# Patient Record
Sex: FEMALE | Race: WHITE | NOT HISPANIC OR LATINO | Employment: OTHER | URBAN - METROPOLITAN AREA
[De-identification: names, ages, dates, MRNs, and addresses within clinical notes are randomized per-mention and may not be internally consistent; named-entity substitution may affect disease eponyms.]

---

## 2019-05-02 ENCOUNTER — OFFICE VISIT (OUTPATIENT)
Dept: NEUROLOGY | Facility: CLINIC | Age: 54
End: 2019-05-02
Payer: COMMERCIAL

## 2019-05-02 VITALS
DIASTOLIC BLOOD PRESSURE: 88 MMHG | HEIGHT: 67 IN | SYSTOLIC BLOOD PRESSURE: 125 MMHG | WEIGHT: 186.31 LBS | BODY MASS INDEX: 29.24 KG/M2 | HEART RATE: 84 BPM

## 2019-05-02 DIAGNOSIS — R27.0 ATAXIA: Primary | ICD-10-CM

## 2019-05-02 DIAGNOSIS — E53.8 VITAMIN B12 DEFICIENCY: ICD-10-CM

## 2019-05-02 PROCEDURE — 3008F BODY MASS INDEX DOCD: CPT | Mod: CPTII,S$GLB,, | Performed by: PSYCHIATRY & NEUROLOGY

## 2019-05-02 PROCEDURE — 99205 OFFICE O/P NEW HI 60 MIN: CPT | Mod: S$GLB,,, | Performed by: PSYCHIATRY & NEUROLOGY

## 2019-05-02 PROCEDURE — 99999 PR PBB SHADOW E&M-NEW PATIENT-LVL III: CPT | Mod: PBBFAC,,, | Performed by: PSYCHIATRY & NEUROLOGY

## 2019-05-02 PROCEDURE — 99999 PR PBB SHADOW E&M-NEW PATIENT-LVL III: ICD-10-PCS | Mod: PBBFAC,,, | Performed by: PSYCHIATRY & NEUROLOGY

## 2019-05-02 PROCEDURE — 3008F PR BODY MASS INDEX (BMI) DOCUMENTED: ICD-10-PCS | Mod: CPTII,S$GLB,, | Performed by: PSYCHIATRY & NEUROLOGY

## 2019-05-02 PROCEDURE — 99205 PR OFFICE/OUTPT VISIT, NEW, LEVL V, 60-74 MIN: ICD-10-PCS | Mod: S$GLB,,, | Performed by: PSYCHIATRY & NEUROLOGY

## 2019-05-02 RX ORDER — CYANOCOBALAMIN 1000 UG/ML
1000 INJECTION, SOLUTION INTRAMUSCULAR; SUBCUTANEOUS
COMMUNITY
Start: 2019-02-06

## 2019-05-02 RX ORDER — AMLODIPINE BESYLATE 5 MG/1
5 TABLET ORAL NIGHTLY
COMMUNITY
Start: 2019-04-01 | End: 2022-04-27

## 2019-05-02 RX ORDER — DESVENLAFAXINE 100 MG/1
100 TABLET, EXTENDED RELEASE ORAL NIGHTLY
COMMUNITY
Start: 2019-04-01

## 2019-05-02 NOTE — PROGRESS NOTES
MOVEMENT DISORDERS CLINIC NEW CONSULT NOTE    PCP/Referring Provider: Aaareferral Self  No address on file  Date of Service: 5/2/2019    Chief Complaint: Ataxia    HPI: Nirali Kirkpatrick is a L HANDED 53 y.o. female with a medical issues significant for HTN, Anxiety, Vit B12 deficiency, who presents with ataxia for 1.5 years. She first noted 1.5 years ago her R foot was dragging and every 3-4 steps she would catch that toe. She noticed her R foot would catch on curbs. Going down steps is especially hard - she mis-steps often. She feels like she has progressively been imbalanced. Her first fall was a year ago. She has fallen more often since then. She falls twice a month now. Falls typically sideways. She holds onto her  for stability but does not use an assist device. Tends to have mor ataxia after she's tired. She occasionally has a had tremor in her hands when starting IVs. She does at times feel clumsy with her hands. No spinning vertigo. No lightheadedness. No visual issues.    No double vision, eye drooping, head drop, dysphagia.  She was told her B12 was low normal Aug 2018, and has had 4 shots since    No dysarthria.    She saw a general neurologist  B6 - NL  B12 - 333    Brain MRI read as normal  Spine and Tspine 2018 - no central disc issues  Has not had an EMG    No fam hx ataxia, MS  Uncle has M. Gravis  Mother and brother have had tremors    PD Review of Symptoms:  Anosmia: None  Dysarthria/Hypophonia: None  Dysphagia/Sialorrhea: None  Hallucinations: None  Depression: None  Cognitive slowing: None  Urinary changes: None  Constipation: at times  Orthostasis: none  Falls: as above  Micrographia: none  Sleep issues:  -SUSAN: at times snores  -RBD: Talks and punches in her sleep  -Sleep Quality: good, wakes every 3 hours    Review of Systems:   Review of Systems   Constitutional: Negative for fever.   HENT: Negative for congestion.    Eyes: Negative for double vision.   Respiratory: Negative for cough  and shortness of breath.    Cardiovascular: Negative for chest pain and leg swelling.   Gastrointestinal: Negative for nausea.   Genitourinary: Negative for dysuria.   Musculoskeletal: Positive for falls.   Skin: Negative for rash.   Neurological: Positive for tremors. Negative for speech change and headaches.   Psychiatric/Behavioral: Negative for depression.         Current Medications:  Outpatient Encounter Medications as of 5/2/2019   Medication Sig Dispense Refill    amLODIPine (NORVASC) 5 MG tablet       cyanocobalamin 1,000 mcg/mL injection       desvenlafaxine succinate (PRISTIQ) 100 MG Tb24        No facility-administered encounter medications on file as of 5/2/2019.        Past Medical History:  HTN    Past Surgical History:  Gallbladder, gastric sleeve    Current Living Situation: home    Social:  Social History     Socioeconomic History    Marital status:      Spouse name: Not on file    Number of children: Not on file    Years of education: Not on file    Highest education level: Not on file   Occupational History    Not on file   Social Needs    Financial resource strain: Not on file    Food insecurity:     Worry: Not on file     Inability: Not on file    Transportation needs:     Medical: Not on file     Non-medical: Not on file   Tobacco Use    Smoking status: Never Smoker   Substance and Sexual Activity    Alcohol use: Not on file    Drug use: Not on file    Sexual activity: Not on file   Lifestyle    Physical activity:     Days per week: Not on file     Minutes per session: Not on file    Stress: Not on file   Relationships    Social connections:     Talks on phone: Not on file     Gets together: Not on file     Attends [employer] service: Not on file     Active member of club or organization: Not on file     Attends meetings of clubs or organizations: Not on file     Relationship status: Not on file   Other Topics Concern    Not on file   Social History Narrative    Not  "on file       Family History:  No family history on file.    PHYSICAL:  /88   Pulse 84   Ht 5' 7" (1.702 m)   Wt 84.5 kg (186 lb 4.6 oz)   BMI 29.18 kg/m²     General Medical Examination:  General: Good hygiene, appropriate appearance.  HEENT: Normocephalic, atraumatic.   Neck: Supple.   Chest: Unlabored breathing.   CV: Symmetric pulses.   Ext: No clubbing, cyanosis, or edema.     Mental Status:  Mood/Affect: Appropriate/congruent.  Level of consciousness: Awake, alert.  Orientation: Oriented to person, place, time and situation.  Language: No Dysarthria    Cranial nerves:  I: Not tested  II: PERRL, VFF to counting  III, IV, VI: EOMI with conjugate gaze and no nystagmus on end gaze  V: Facial sensation intact and symmetric over the bilateral V1-V3  VII: Facial muscle activation intact and symmetric over the bilateral upper and lower face  VIII: Hearing intact in the b/l ears and symmetrical to finger rub  IX, X, XII: TUP midline - no atrophy or fasiculations  X: SCMs and shoulder shrug full strength b/l and symmetric  Can easily say PA PA GA GA KA KA    Motor:   Strength Intact throughout upper and lower extremities, 5/5.  No foot drop    DTRs:  ? Biceps Triceps Brachioradialis Knee Ankle   Left 2+ 2+ 2+ 2+ 2+   Right 2+ 2+ 2+ 2+ 2+   -Plantar reflex: NL    ? Finger taps Finger flicks PEDRO Heel taps   Left nl nl nl nl   Right nl nl nl nl   Neck tone: nl  ? Arm Leg   Left nl nl   Right nl nl     Sensation:   -Light touch: Intact and symmetric in the bilateral upper and lower extremities.  -Temp: Intact and symmetric in the bilateral upper and lower extremities.  -Vibration: mild vibratory loss to mid-shin  Coordination:   -Finger to nose: intention tremor mild bilat  Past-points 1 inch R hand >L hand  -Heel to shin: mild issues L foot    Gait:  -Arises from chair without use of hands.  -Casual gait is: wide based, somewhat stiff, circumducts, waves moderately L to R when she walks - ataxic  -Stride length: " nl  -Arm Swing: decreased R  -Turning: wide  -Tandem gait: cannot  No foot drop    Romberg: strongly POS    Laboratory Data:  NA    Imaging:  Brain MRI w/o cerebellar degeneration or lesion - however poor quality MRI  Cspine w/o without central impingement    Assessment//Plan:   Problem List Items Addressed This Visit        Neuro    Ataxia - Primary    Current Assessment & Plan     Moderate progressive ataxia. Risk factors are known B12 deficiency from gastric sleeve. She has been repleated through IV but unknown if her levels emile post treatment. POS rhomberg suggests a proprioceptive cause. No dysarthria or nystagmus to strongly suggests a central cause of ataxia. NL brain and cspine MRI however images are poor quality. Suggested EMG to screen for level of neuropathy, MRI brain wwo to screen for cerebellar dz, and lab workup including GAD65 and paraneoplastics given the relatively subacute onset of her ataxia. No fam hx ataxia.  Suggested aggressive PT.         Relevant Orders    GLUTAMIC ACID DECARBOXYLASE    Comprehensive metabolic panel    Ammonia    Vitamin B12 Deficiency Panel    VITAMIN B1    VITAMIN E    TSH    HEAVY METALS SCREEN, BLOOD (QUANTITATIVE)    PTH, intact    Paraneoplastic Autoantibody Evaulation, Serum    FOLATE    EMG W/ ULTRASOUND AND NERVE CONDUCTION TEST 2 Extremities    Ambulatory consult to Physical Therapy    MRI Brain W WO Contrast       Endocrine    Vitamin B12 deficiency    Current Assessment & Plan     Known B12 deficiency due to gastric sleeve. Will f/u B12 levels.               Follow-up: 1 mo  Discussed the importance of ongoing exercise in efforts to improve mobility and balance.    Ondina Polo MD, MS [facility]  Department of Neurology  Movement Disorders

## 2019-05-02 NOTE — ASSESSMENT & PLAN NOTE
Moderate progressive ataxia. Risk factors are known B12 deficiency from gastric sleeve. She has been repleated through IV but unknown if her levels emile post treatment. POS rhomberg suggests a proprioceptive cause. No dysarthria or nystagmus to strongly suggests a central cause of ataxia. NL brain and cspine MRI however images are poor quality. Suggested EMG to screen for level of neuropathy, MRI brain wwo to screen for cerebellar dz, and lab workup including GAD65 and paraneoplastics given the relatively subacute onset of her ataxia. No fam hx ataxia.  Suggested aggressive PT.

## 2019-05-10 ENCOUNTER — HOSPITAL ENCOUNTER (OUTPATIENT)
Dept: RADIOLOGY | Facility: HOSPITAL | Age: 54
Discharge: HOME | End: 2019-05-10
Attending: PSYCHIATRY & NEUROLOGY
Payer: COMMERCIAL

## 2019-05-10 DIAGNOSIS — R27.0 ATAXIA: ICD-10-CM

## 2019-05-10 PROCEDURE — 25500020 PHARM REV CODE 255: Performed by: PSYCHIATRY & NEUROLOGY

## 2019-05-10 PROCEDURE — A9585 GADOBUTROL INJECTION: HCPCS | Performed by: PSYCHIATRY & NEUROLOGY

## 2019-05-10 PROCEDURE — 70553 MRI BRAIN STEM W/O & W/DYE: CPT | Mod: 26,,, | Performed by: RADIOLOGY

## 2019-05-10 PROCEDURE — 70553 MRI BRAIN W WO CONTRAST: ICD-10-PCS | Mod: 26,,, | Performed by: RADIOLOGY

## 2019-05-10 PROCEDURE — 70553 MRI BRAIN STEM W/O & W/DYE: CPT | Mod: TC

## 2019-05-10 RX ORDER — GADOBUTROL 604.72 MG/ML
9 INJECTION INTRAVENOUS
Status: COMPLETED | OUTPATIENT
Start: 2019-05-10 | End: 2019-05-10

## 2019-05-10 RX ADMIN — GADOBUTROL: 604.72 INJECTION INTRAVENOUS at 08:05

## 2019-05-12 ENCOUNTER — PATIENT MESSAGE (OUTPATIENT)
Dept: NEUROLOGY | Facility: CLINIC | Age: 54
End: 2019-05-12

## 2019-06-03 ENCOUNTER — OFFICE VISIT (OUTPATIENT)
Dept: NEUROLOGY | Facility: CLINIC | Age: 54
End: 2019-06-03
Payer: COMMERCIAL

## 2019-06-03 VITALS
BODY MASS INDEX: 29.51 KG/M2 | WEIGHT: 188 LBS | SYSTOLIC BLOOD PRESSURE: 122 MMHG | DIASTOLIC BLOOD PRESSURE: 89 MMHG | HEIGHT: 67 IN | HEART RATE: 66 BPM

## 2019-06-03 DIAGNOSIS — R27.0 ATAXIA: Primary | ICD-10-CM

## 2019-06-03 PROCEDURE — 3008F BODY MASS INDEX DOCD: CPT | Mod: CPTII,S$GLB,, | Performed by: PSYCHIATRY & NEUROLOGY

## 2019-06-03 PROCEDURE — 99215 OFFICE O/P EST HI 40 MIN: CPT | Mod: S$GLB,,, | Performed by: PSYCHIATRY & NEUROLOGY

## 2019-06-03 PROCEDURE — 99999 PR PBB SHADOW E&M-EST. PATIENT-LVL III: ICD-10-PCS | Mod: PBBFAC,,, | Performed by: PSYCHIATRY & NEUROLOGY

## 2019-06-03 PROCEDURE — 99215 PR OFFICE/OUTPT VISIT, EST, LEVL V, 40-54 MIN: ICD-10-PCS | Mod: S$GLB,,, | Performed by: PSYCHIATRY & NEUROLOGY

## 2019-06-03 PROCEDURE — 99999 PR PBB SHADOW E&M-EST. PATIENT-LVL III: CPT | Mod: PBBFAC,,, | Performed by: PSYCHIATRY & NEUROLOGY

## 2019-06-03 PROCEDURE — 3008F PR BODY MASS INDEX (BMI) DOCUMENTED: ICD-10-PCS | Mod: CPTII,S$GLB,, | Performed by: PSYCHIATRY & NEUROLOGY

## 2019-06-03 NOTE — PROGRESS NOTES
"MOVEMENT DISORDERS CLINIC NEW CONSULT NOTE    PCP/Referring Provider: No referring provider defined for this encounter.  Date of Service: 6/3/2019    Chief Complaint: Ataxia    Interval hx  Continues to have decreased R arm swing  Continues to have progressive imbalance  Trouble with stairs, going down, however has R knee pain  Struggles to get up from the floor without using her arms    Has been going to PT twice a week  No numbness or tingling in feet  No change to voice  No trouble swallowing    At times she has ptosis    MRI brain showed no lesions or cerebellar atrophy    "PriorHPI: Nirali Kirkpatrick is a L HANDED 53 y.o. female with a medical issues significant for HTN, Anxiety, Vit B12 deficiency, who presents with ataxia for 1.5 years. She first noted 1.5 years ago her R foot was dragging and every 3-4 steps she would catch that toe. She noticed her R foot would catch on curbs. Going down steps is especially hard - she mis-steps often. She feels like she has progressively been imbalanced. Her first fall was a year ago. She has fallen more often since then. She falls twice a month now. Falls typically sideways. She holds onto her  for stability but does not use an assist device. Tends to have more ataxia after she's tired. She occasionally has a had tremor in her hands when starting IVs. She does at times feel clumsy with her hands. No spinning vertigo. No lightheadedness. No visual issues.    No double vision, eye drooping, head drop, dysphagia.  She was told her B12 was low normal Aug 2018, and has had 4 shots since    No dysarthria.    She saw a general neurologist  B6 - NL  B12 - 333    Brain MRI read as normal  Spine and Tspine 2018 - no central disc issues  Has not had an EMG    No fam hx ataxia, MS  Uncle has M. Gravis  Mother and brother have had tremors    PD Review of Symptoms:  Anosmia: None  Dysarthria/Hypophonia: None  Dysphagia/Sialorrhea: None  Hallucinations: None  Depression: " "None  Cognitive slowing: None  Urinary changes: None  Constipation: at times  Orthostasis: none  Falls: as above  Micrographia: none  Sleep issues:  -SUSAN: at times snores  -RBD: Talks and punches in her sleep  -Sleep Quality: good, wakes every 3 hours"    Review of Systems:   Review of Systems   Constitutional: Negative for fever.   HENT: Negative for congestion.    Eyes: Negative for double vision.   Respiratory: Negative for cough and shortness of breath.    Cardiovascular: Negative for chest pain and leg swelling.   Gastrointestinal: Negative for nausea.   Genitourinary: Negative for dysuria.   Musculoskeletal: Positive for falls.   Skin: Negative for rash.   Neurological: Positive for tremors. Negative for speech change and headaches.   Psychiatric/Behavioral: Negative for depression.         Current Medications:  Outpatient Encounter Medications as of 6/3/2019   Medication Sig Dispense Refill    amLODIPine (NORVASC) 5 MG tablet       cyanocobalamin 1,000 mcg/mL injection       desvenlafaxine succinate (PRISTIQ) 100 MG Tb24        No facility-administered encounter medications on file as of 6/3/2019.        Past Medical History:  HTN    Past Surgical History:  Gallbladder, gastric sleeve    Current Living Situation: home    Social:  Social History     Socioeconomic History    Marital status:      Spouse name: Not on file    Number of children: Not on file    Years of education: Not on file    Highest education level: Not on file   Occupational History    Not on file   Social Needs    Financial resource strain: Not on file    Food insecurity:     Worry: Not on file     Inability: Not on file    Transportation needs:     Medical: Not on file     Non-medical: Not on file   Tobacco Use    Smoking status: Never Smoker   Substance and Sexual Activity    Alcohol use: Not on file    Drug use: Not on file    Sexual activity: Not on file   Lifestyle    Physical activity:     Days per week: Not on " "file     Minutes per session: Not on file    Stress: Not on file   Relationships    Social connections:     Talks on phone: Not on file     Gets together: Not on file     Attends Zoroastrian service: Not on file     Active member of club or organization: Not on file     Attends meetings of clubs or organizations: Not on file     Relationship status: Not on file   Other Topics Concern    Not on file   Social History Narrative    Not on file       Family History:  No family history on file.    PHYSICAL:  /89   Pulse 66   Ht 5' 7" (1.702 m)   Wt 85.3 kg (188 lb)   BMI 29.44 kg/m²     General Medical Examination:  General: Good hygiene, appropriate appearance.  HEENT: Normocephalic, atraumatic.   Neck: Supple.   Chest: Unlabored breathing.   CV: Symmetric pulses.   Ext: No clubbing, cyanosis, or edema.     Mental Status:  Mood/Affect: Appropriate/congruent.  Level of consciousness: Awake, alert.  Orientation: Oriented to person, place, time and situation.  Language: No Dysarthria    Cranial nerves:  I: Not tested  II: PERRL, VFF to counting  III, IV, VI: EOMI with conjugate gaze and no nystagmus on end gaze ? KF rings  V: Facial sensation intact and symmetric over the bilateral V1-V3  VII: Facial muscle activation intact and symmetric over the bilateral upper and lower face  VIII: Hearing intact in the b/l ears and symmetrical to finger rub  IX, X, XII: TUP midline - no atrophy or fasiculations  X: SCMs and shoulder shrug full strength b/l and symmetric  Can easily say PA PA GA GA KA KA    Gaze fatigues at times  No ptosis    Motor:   Strength Intact throughout upper and lower extremities, 5/5.  No foot drop    DTRs:  ? Biceps Triceps Brachioradialis Knee Ankle   Left 2+ 2+ 2+ 2+ 2+   Right 2+ 2+ 2+ 2+ 2+   -Plantar reflex: NL    ? Finger taps Finger flicks PEDRO Heel taps   Left nl nl nl nl   Right nl nl nl nl   Neck tone: nl  ? Arm Leg   Left nl nl   Right nl nl     Sensation:   -Light touch: Intact and " symmetric in the bilateral upper and lower extremities.  -Temp: Intact and symmetric in the bilateral upper and lower extremities.  -Vibration: mild vibratory loss to mid-shin  Coordination:   -Finger to nose: intention tremor mild bilat  Past-points 1 inch R hand >L hand  -Heel to shin: mild issues L foot    Gait:  -Arises from chair without use of hands.  -Casual gait is: wide based, somewhat stiff, circumducts, waves moderately L to R when she walks - ataxic  -Stride length: nl  -Arm Swing: decreased R  -Turning: wide  -Tandem gait: cannot  No foot drop    Romberg: strongly POS    Laboratory Data:  Results for RADHA HAHN (MRN [id number]) as of 6/3/2019 09:52   Ref. Range 5/2/2019 11:23   Folate Latest Ref Range: 4.0 - 24.0 ng/mL 10.7   Vitamin B-12 Latest Ref Range: 180 - 914 ng/L 525   Sodium Latest Ref Range: 136 - 145 mmol/L 141   Potassium Latest Ref Range: 3.5 - 5.1 mmol/L 3.7   Chloride Latest Ref Range: 95 - 110 mmol/L 106   CO2 Latest Ref Range: 23 - 29 mmol/L 26   Anion Gap Latest Ref Range: 8 - 16 mmol/L 9   BUN, Bld Latest Ref Range: 6 - 20 mg/dL 22 (H)   Creatinine Latest Ref Range: 0.5 - 1.4 mg/dL 0.8   eGFR if non African American Latest Ref Range: >60 mL/min/1.73 m^2 >60.0   eGFR if African American Latest Ref Range: >60 mL/min/1.73 m^2 >60.0   Glucose Latest Ref Range: 70 - 110 mg/dL 92   Calcium Latest Ref Range: 8.7 - 10.5 mg/dL 9.8   Alkaline Phosphatase Latest Ref Range: 55 - 135 U/L 86   PROTEIN TOTAL Latest Ref Range: 6.0 - 8.4 g/dL 7.4   Albumin Latest Ref Range: 3.5 - 5.2 g/dL 4.2   BILIRUBIN TOTAL Latest Ref Range: 0.1 - 1.0 mg/dL 0.6   AST Latest Ref Range: 10 - 40 U/L 19   ALT Latest Ref Range: 10 - 44 U/L 16   Ammonia Latest Ref Range: 10 - 50 umol/L 26   Arsenic Latest Ref Range: 0 - 12 ng/mL <1   Thiamine Latest Ref Range: 38 - 122 ug/L 63   Vitamin E Latest Ref Range: 500 - 1800 ug/dL 1302   Glutamic Acid Decarb Ab Latest Ref Range: <=0.02 nmol/L 0.00   TSH Latest Ref Range:  0.400 - 4.000 uIU/mL 0.649   PTH Latest Ref Range: 9.0 - 77.0 pg/mL 103.0 (H)   Race Unknown Test Not Performed   AChR Binding Ab, Serum Latest Ref Range: <=0.02 nmol/L 0.00   AChR Ganglionic Neuronal Ab Latest Ref Range: <=0.02 nmol/L 0.00   CRMP-5 IgG Latest Ref Range: <1:240 titer Negative   Neuronal (V-G) K+ Channel Ab, Serum Latest Ref Range: <=0.02 nmol/L 0.00   NMO Interpretive Comments Unknown SEE BELOW   N-Type Calcium Channel Ab Latest Ref Range: <=0.03 nmol/L 0.00   P/Q Type Calcium Channel Ab Latest Ref Range: <=0.02 nmol/L 0.00   PAVAL AGNA-1, Serum Latest Ref Range: <1:240 titer Negative   PAVAL TOBY-1, Serum Latest Ref Range: <1:240 titer Negative   PAVAL TOBY-2, Serum Latest Ref Range: <1:240 titer Negative   PAVAL TOBY-3, Serum Latest Ref Range: <1:240 titer Negative   PAVAL reflex test added Unknown None.   PAVAL,  Amphiphysin Ab, Serum Latest Ref Range: <1:240 titer Negative   PAVAL, PCA-1, Serum Latest Ref Range: <1:240 titer Negative   PAVAL, PCA-2, Serum Latest Ref Range: <1:240 titer Negative   PAVAL, PCA-Tr, Serum Latest Ref Range: <1:240 titer Negative   STRIATED MUSCLE AB Latest Ref Range: <1:120 titer Negative   Lead Latest Ref Range: 0.0 - 4.9 mcg/dL <1.0   Cadmium Latest Ref Range: 0.0 - 4.9 ng/mL 0.3   City Unknown Test Not Performed   County Unknown Test Not Performed   Guardian First Name Unknown Test Not Performed   Guardian Last Name Unknown Test Not Performed   Home Phone Unknown Test Not Performed   [place] Latest Ref Range: 0 - 9 ng/mL 2   State Unknown Test Not Performed   Street Address Unknown Test Not Performed   Venous/Capillary Unknown Test Not Performed   Zip Unknown Test Not Performed       Imaging:  Brain MRI w/o cerebellar degeneration or lesion - however poor quality MRI  Repeat brain MRI  FINDINGS:  MRI of the brain demonstrates no infarction, mass, hemorrhage, extra-axial collection, or other acute finding.  A skull base lesions are seen.  Following contrast  administration, there is no abnormal enhancing lesion identified.  A small incidental developmental venous anomaly is seen in the right medial posterior thalamus.  Cspine w/o without central impingement    Assessment//Plan:   Problem List Items Addressed This Visit     None          Follow-up: 1 mo  Discussed the importance of ongoing exercise in efforts to improve mobility and balance.    Ondina Polo MD, MS  [facility]  Department of Neurology  Movement Disorders    PAGE  cerulo  ESR  CRP  Also  CK    EMG is coming

## 2019-06-19 ENCOUNTER — TELEPHONE (OUTPATIENT)
Dept: NEUROLOGY | Facility: CLINIC | Age: 54
End: 2019-06-19

## 2019-06-19 NOTE — TELEPHONE ENCOUNTER
Faxed sent     ----- Message from Saul Winkler sent at 6/19/2019  3:29 PM CDT -----  Needs Advice    Reason for call: Marisela states they need clinical notes for NM spect scan so they can get auth from [employer]. Pls fax to .        Communication Preference: Marisela w/ [facility] @ [phone]    Additional Information:

## 2019-07-12 ENCOUNTER — TELEPHONE (OUTPATIENT)
Dept: NEUROLOGY | Facility: CLINIC | Age: 54
End: 2019-07-12

## 2019-07-12 NOTE — TELEPHONE ENCOUNTER
----- Message from Franck Cerda sent at 7/11/2019  3:02 PM CDT -----  Contact: Gaby combs [facility] @ [phone]  Caller is requesting a return phone call regarding the signed plan of care, pls call or fax to: [phone]

## 2019-08-16 ENCOUNTER — PROCEDURE VISIT (OUTPATIENT)
Dept: NEUROLOGY | Facility: CLINIC | Age: 54
End: 2019-08-16
Payer: COMMERCIAL

## 2019-08-16 DIAGNOSIS — R27.0 ATAXIA: ICD-10-CM

## 2019-08-16 PROCEDURE — 95886 PR EMG COMPLETE, W/ NERVE CONDUCTION STUDIES, 5+ MUSCLES: ICD-10-PCS | Mod: S$GLB,,, | Performed by: PSYCHIATRY & NEUROLOGY

## 2019-08-16 PROCEDURE — 95886 MUSC TEST DONE W/N TEST COMP: CPT | Mod: S$GLB,,, | Performed by: PSYCHIATRY & NEUROLOGY

## 2019-08-16 PROCEDURE — 95910 PR NERVE CONDUCTION STUDY; 7-8 STUDIES: ICD-10-PCS | Mod: S$GLB,,, | Performed by: PSYCHIATRY & NEUROLOGY

## 2019-08-16 PROCEDURE — 95910 NRV CNDJ TEST 7-8 STUDIES: CPT | Mod: S$GLB,,, | Performed by: PSYCHIATRY & NEUROLOGY

## 2019-08-23 ENCOUNTER — OFFICE VISIT (OUTPATIENT)
Dept: NEUROLOGY | Facility: CLINIC | Age: 54
End: 2019-08-23
Payer: COMMERCIAL

## 2019-08-23 ENCOUNTER — LAB VISIT (OUTPATIENT)
Dept: LAB | Facility: HOSPITAL | Age: 54
End: 2019-08-23
Attending: PSYCHIATRY & NEUROLOGY
Payer: COMMERCIAL

## 2019-08-23 VITALS
SYSTOLIC BLOOD PRESSURE: 119 MMHG | DIASTOLIC BLOOD PRESSURE: 83 MMHG | HEART RATE: 65 BPM | HEIGHT: 67 IN | BODY MASS INDEX: 30.45 KG/M2 | WEIGHT: 194 LBS

## 2019-08-23 DIAGNOSIS — R27.0 ATAXIA: ICD-10-CM

## 2019-08-23 DIAGNOSIS — R27.0 ATAXIA: Primary | ICD-10-CM

## 2019-08-23 PROCEDURE — 83921 ORGANIC ACID SINGLE QUANT: CPT

## 2019-08-23 PROCEDURE — 86341 ISLET CELL ANTIBODY: CPT

## 2019-08-23 PROCEDURE — 82607 VITAMIN B-12: CPT

## 2019-08-23 PROCEDURE — 3008F BODY MASS INDEX DOCD: CPT | Mod: CPTII,S$GLB,, | Performed by: PSYCHIATRY & NEUROLOGY

## 2019-08-23 PROCEDURE — 99214 PR OFFICE/OUTPT VISIT, EST, LEVL IV, 30-39 MIN: ICD-10-PCS | Mod: S$GLB,,, | Performed by: PSYCHIATRY & NEUROLOGY

## 2019-08-23 PROCEDURE — 36415 COLL VENOUS BLD VENIPUNCTURE: CPT

## 2019-08-23 PROCEDURE — 3008F PR BODY MASS INDEX (BMI) DOCUMENTED: ICD-10-PCS | Mod: CPTII,S$GLB,, | Performed by: PSYCHIATRY & NEUROLOGY

## 2019-08-23 PROCEDURE — 99999 PR PBB SHADOW E&M-EST. PATIENT-LVL III: CPT | Mod: PBBFAC,,, | Performed by: PSYCHIATRY & NEUROLOGY

## 2019-08-23 PROCEDURE — 99214 OFFICE O/P EST MOD 30 MIN: CPT | Mod: S$GLB,,, | Performed by: PSYCHIATRY & NEUROLOGY

## 2019-08-23 PROCEDURE — 99999 PR PBB SHADOW E&M-EST. PATIENT-LVL III: ICD-10-PCS | Mod: PBBFAC,,, | Performed by: PSYCHIATRY & NEUROLOGY

## 2019-08-23 NOTE — ASSESSMENT & PLAN NOTE
"Moderate progressive ataxia, ongoing and worsening. Mild dysarthria is new. EMG shows no severe neuropathy making B12 component to ataxia less profound. No overt fam hx ataxia however son had "funny eye movements" and incoordination. Brain MRI repeat shows no cerebellar dz. Cspine not impinged. Lab workup including paraneoplastic, David 65 neg. I am concerned given her negative workup and progressive ataxia that she could have an autoimmune dz. She has been tested for paraneoplastic panel which was negative. In my experience autoimmune ataxia can be severe and seronegative. Given her profound and quick decline in gait, I am suggesting starting immune therapy (IVIG) to prevent further damage. Her brain MRI is normal which is typical for autoimmune disease.Will consider SCA testing in the future. F/u ataxia labwork. CT C/Ab/Pel to screen for malignancy.  Suggested aggressive PT.  "

## 2019-08-23 NOTE — PROGRESS NOTES
"MOVEMENT DISORDERS CLINIC Followup  PCP/Referring Provider: Aaareferral Self  No address on file  Date of Service: 8/27/2019    Chief Complaint: Ataxia    Interval hx  Continues to have decreased R arm swing  Continues to have progressive imbalance - stumbles on a daily basis now whereas before intermittent  Struggles to get up from the floor without using her arms  Trouble with stairs, going down, however has R knee pain    Has been going to PT off and on  No numbness or tingling in feet  Her voice is mildly slurred which is new - people at work ask her to repeat herself  No trouble swallowing    MRI brain showed no lesions or cerebellar atrophy  PAGE scans showed normal uptake  EMG was normal  labwork negative thus far    She continues to get B12 injections  Notes her son was diagnosed with PANDAS and has funny eye movements - poor fine motor skills as a kid    "PriorHPI: Nirali Kirkpatrick is a L HANDED 53 y.o. female with a medical issues significant for HTN, Anxiety, Vit B12 deficiency, who presents with ataxia for 1.5 years. She first noted 1.5 years ago her R foot was dragging and every 3-4 steps she would catch that toe. She noticed her R foot would catch on curbs. Going down steps is especially hard - she mis-steps often. She feels like she has progressively been imbalanced. Her first fall was a year ago. She has fallen more often since then. She falls twice a month now. Falls typically sideways. She holds onto her  for stability but does not use an assist device. Tends to have more ataxia after she's tired. She occasionally has a had tremor in her hands when starting IVs. She does at times feel clumsy with her hands. No spinning vertigo. No lightheadedness. No visual issues.    No double vision, eye drooping, head drop, dysphagia.  She was told her B12 was low normal Aug 2018, and has had 4 shots since    No dysarthria.    She saw a general neurologist  B6 - NL  B12 - 333    Brain MRI read as " "normal  Spine and Tspine 2018 - no central disc issues  Has not had an EMG    No fam hx ataxia, MS  Uncle has M. Gravis  Mother and brother have had tremors    PD Review of Symptoms:  Anosmia: None  Dysarthria/Hypophonia: None  Dysphagia/Sialorrhea: None  Hallucinations: None  Depression: None  Cognitive slowing: None  Urinary changes: None  Constipation: at times  Orthostasis: none  Falls: as above  Micrographia: none  Sleep issues:  -SUSAN: at times snores  -RBD: Talks and punches in her sleep  -Sleep Quality: good, wakes every 3 hours"    Review of Systems:   Review of Systems   Constitutional: Negative for fever.   HENT: Negative for congestion.    Eyes: Negative for double vision.   Respiratory: Negative for cough and shortness of breath.    Cardiovascular: Negative for chest pain and leg swelling.   Gastrointestinal: Negative for nausea.   Genitourinary: Negative for dysuria.   Musculoskeletal: Positive for falls.   Skin: Negative for rash.   Neurological: Positive for tremors. Negative for speech change and headaches.   Psychiatric/Behavioral: Negative for depression.         Current Medications:  Outpatient Encounter Medications as of 8/23/2019   Medication Sig Dispense Refill    amLODIPine (NORVASC) 5 MG tablet Take 5 mg by mouth once daily.       cyanocobalamin 1,000 mcg/mL injection 1,000 mcg every 30 days.       desvenlafaxine succinate (PRISTIQ) 100 MG Tb24 100 mg once daily.       multivitamin capsule Take 1 capsule by mouth once daily.       No facility-administered encounter medications on file as of 8/23/2019.        Past Medical History:  HTN    Past Surgical History:  Gallbladder, gastric sleeve    Current Living Situation: home    Social:  Social History     Socioeconomic History    Marital status:      Spouse name: Not on file    Number of children: Not on file    Years of education: Not on file    Highest education level: Not on file   Occupational History    Not on file   Social " "Needs    Financial resource strain: Not on file    Food insecurity:     Worry: Not on file     Inability: Not on file    Transportation needs:     Medical: Not on file     Non-medical: Not on file   Tobacco Use    Smoking status: Never Smoker   Substance and Sexual Activity    Alcohol use: Not on file    Drug use: Not on file    Sexual activity: Not on file   Lifestyle    Physical activity:     Days per week: Not on file     Minutes per session: Not on file    Stress: Not on file   Relationships    Social connections:     Talks on phone: Not on file     Gets together: Not on file     Attends Mu-ism service: Not on file     Active member of club or organization: Not on file     Attends meetings of clubs or organizations: Not on file     Relationship status: Not on file   Other Topics Concern    Not on file   Social History Narrative    Not on file       Family History:  As above    PHYSICAL:  /83 (BP Location: Right arm, Patient Position: Sitting)   Pulse 65   Ht 5' 7" (1.702 m)   Wt 88 kg (194 lb 0.1 oz)   BMI 30.39 kg/m²     General Medical Examination:  General: Good hygiene, appropriate appearance.  HEENT: Normocephalic, atraumatic.   Neck: Supple.   Chest: Unlabored breathing.   CV: Symmetric pulses.   Ext: No clubbing, cyanosis, or edema.     Mental Status:  Mood/Affect: Appropriate/congruent.  Level of consciousness: Awake, alert.  Orientation: Oriented to person, place, time and situation.  Language: No Dysarthria    Cranial nerves:  I: Not tested  II: PERRL, VFF to counting  III, IV, VI: EOMI with conjugate gaze and no nystagmus on end gaze   V: Facial sensation intact and symmetric over the bilateral V1-V3  VII: Facial muscle activation intact and symmetric over the bilateral upper and lower face  VIII: Hearing intact in the b/l ears and symmetrical to finger rub  IX, X, XII: TUP midline - no atrophy or fasiculations  X: SCMs and shoulder shrug full strength b/l and symmetric  Can " easily say BRAEDEN ANDERS  Trouble with GAs and Naomy which is new    Motor:   Strength Intact throughout upper and lower extremities, 5/5.  No foot drop    DTRs:  ? Biceps Triceps Brachioradialis Knee Ankle   Left 2+ 2+ 2+ 2+ 2+   Right 2+ 2+ 2+ 2+ 2+   -Plantar reflex: NL    ? Finger taps Finger flicks PEDRO Heel taps   Left nl nl nl nl   Right nl nl nl nl   Neck tone: nl  ? Arm Leg   Left nl nl   Right nl nl     Sensation:   -Light touch: Intact and symmetric in the bilateral upper and lower extremities.  -Temp: Intact and symmetric in the bilateral upper and lower extremities.  -Vibration: mild vibratory loss to mid-shin    Coordination:   -Finger to nose: intention tremor mild bilat  Past-points 1 inch R hand >L hand  Disdiatochokinesias R hand moderate  -Heel to shin: mild issues L foot  R hand spiral poor  L hand NL    No resting or postural tremor    Gait:  -Arises from chair without use of hands.  -Casual gait is: wide based, somewhat stiff, circumducts, waves moderately L to R when she walks - ataxic - more so than before  -Stride length: nl  -Arm Swing: decreased R  -Turning: wide  -Tandem gait: cannot  No foot drop    Romberg: strongly POS    Laboratory Data:  Results for RADHA HAHN (MRN [id number]) as of 6/3/2019 09:52   Ref. Range 5/2/2019 11:23   Folate Latest Ref Range: 4.0 - 24.0 ng/mL 10.7   Vitamin B-12 Latest Ref Range: 180 - 914 ng/L 525   Sodium Latest Ref Range: 136 - 145 mmol/L 141   Potassium Latest Ref Range: 3.5 - 5.1 mmol/L 3.7   Chloride Latest Ref Range: 95 - 110 mmol/L 106   CO2 Latest Ref Range: 23 - 29 mmol/L 26   Anion Gap Latest Ref Range: 8 - 16 mmol/L 9   BUN, Bld Latest Ref Range: 6 - 20 mg/dL 22 (H)   Creatinine Latest Ref Range: 0.5 - 1.4 mg/dL 0.8   eGFR if non African American Latest Ref Range: >60 mL/min/1.73 m^2 >60.0   eGFR if African American Latest Ref Range: >60 mL/min/1.73 m^2 >60.0   Glucose Latest Ref Range: 70 - 110 mg/dL 92   Calcium Latest Ref Range: 8.7 - 10.5  mg/dL 9.8   Alkaline Phosphatase Latest Ref Range: 55 - 135 U/L 86   PROTEIN TOTAL Latest Ref Range: 6.0 - 8.4 g/dL 7.4   Albumin Latest Ref Range: 3.5 - 5.2 g/dL 4.2   BILIRUBIN TOTAL Latest Ref Range: 0.1 - 1.0 mg/dL 0.6   AST Latest Ref Range: 10 - 40 U/L 19   ALT Latest Ref Range: 10 - 44 U/L 16   Ammonia Latest Ref Range: 10 - 50 umol/L 26   Arsenic Latest Ref Range: 0 - 12 ng/mL <1   Thiamine Latest Ref Range: 38 - 122 ug/L 63   Vitamin E Latest Ref Range: 500 - 1800 ug/dL 1302   Glutamic Acid Decarb Ab Latest Ref Range: <=0.02 nmol/L 0.00   TSH Latest Ref Range: 0.400 - 4.000 uIU/mL 0.649   PTH Latest Ref Range: 9.0 - 77.0 pg/mL 103.0 (H)   Race Unknown Test Not Performed   AChR Binding Ab, Serum Latest Ref Range: <=0.02 nmol/L 0.00   AChR Ganglionic Neuronal Ab Latest Ref Range: <=0.02 nmol/L 0.00   CRMP-5 IgG Latest Ref Range: <1:240 titer Negative   Neuronal (V-G) K+ Channel Ab, Serum Latest Ref Range: <=0.02 nmol/L 0.00   NMO Interpretive Comments Unknown SEE BELOW   N-Type Calcium Channel Ab Latest Ref Range: <=0.03 nmol/L 0.00   P/Q Type Calcium Channel Ab Latest Ref Range: <=0.02 nmol/L 0.00   PAVAL AGNA-1, Serum Latest Ref Range: <1:240 titer Negative   PAVAL TOBY-1, Serum Latest Ref Range: <1:240 titer Negative   PAVAL TOBY-2, Serum Latest Ref Range: <1:240 titer Negative   PAVAL TOBY-3, Serum Latest Ref Range: <1:240 titer Negative   PAVAL reflex test added Unknown None.   PAVAL,  Amphiphysin Ab, Serum Latest Ref Range: <1:240 titer Negative   PAVAL, PCA-1, Serum Latest Ref Range: <1:240 titer Negative   PAVAL, PCA-2, Serum Latest Ref Range: <1:240 titer Negative   PAVAL, PCA-Tr, Serum Latest Ref Range: <1:240 titer Negative   STRIATED MUSCLE AB Latest Ref Range: <1:120 titer Negative   Lead Latest Ref Range: 0.0 - 4.9 mcg/dL <1.0   Cadmium Latest Ref Range: 0.0 - 4.9 ng/mL 0.3   City Unknown Test Not Performed   [facility] Unknown Test Not Performed   Guardian First Name Unknown Test Not Performed    Guardian Last Name Unknown Test Not Performed   Home Phone Unknown Test Not Performed   [place] Latest Ref Range: 0 - 9 ng/mL 2   State Unknown Test Not Performed   Street Address Unknown Test Not Performed   Venous/Capillary Unknown Test Not Performed   Zip Unknown Test Not Performed      Results for RADHA HAHN (MRN [id number]) as of 8/23/2019 10:10   Ref. Range 6/3/2019 10:27   Sed Rate Latest Ref Range: 0 - 36 mm/Hr 13   CRP Latest Ref Range: 0.0 - 8.2 mg/L 0.5   CPK Latest Ref Range: 20 - 180 U/L 55   CERULOPLASMIN Latest Ref Range: 15.0 - 45.0 mg/dL 33.0   Acetylchol Modul Ab Latest Units: % 0   AChR Binding Ab, Serum Latest Ref Range: <=0.02 nmol/L 0.00   MG Interpretive Comments Unknown SEE BELOW   MuSK Antibody Test Latest Ref Range: 0.00 - 0.02 nmol/L 0.00   STRIATED MUSCLE AB Latest Ref Range: <1:120 titer Negative   Aldolase Latest Ref Range: 1.2 - 7.6 U/L 3.5       Imaging:  Brain MRI w/o cerebellar degeneration or lesion - however poor quality MRI  Repeat brain MRI  FINDINGS:  MRI of the brain demonstrates no infarction, mass, hemorrhage, extra-axial collection, or other acute finding.  A skull base lesions are seen.  Following contrast administration, there is no abnormal enhancing lesion identified.  A small incidental developmental venous anomaly is seen in the right medial posterior thalamus.  Cspine w/o without central impingement    EMG  Summary   Nerve conduction study of bilateral lower extremities revealed normal antidromic sensory peak latencies, action potentials, and conduction velocities.   Motor peak latencies, amplitudes, and conduction velocities were normal. F-waves were normal.   Concentric needle examination of selected muscles in bilateral lower extremities revealed no evidence of acute or chronic denervation.   Impression   This is a normal study.     Assessment//Plan:   Problem List Items Addressed This Visit        Neuro    Ataxia - Primary    Current Assessment & Plan      "Moderate progressive ataxia, ongoing and worsening. Mild dysarthria is new. EMG shows no severe neuropathy making B12 component to ataxia less profound. No overt fam hx ataxia however son had "funny eye movements" and incoordination. Brain MRI repeat shows no cerebellar dz. Cspine not impinged. Lab workup including paraneoplastic, David 65 neg. I am concerned given her negative workup and progressive ataxia that she could have an autoimmune dz. She has been tested for paraneoplastic panel which was negative. In my experience autoimmune ataxia can be severe and seronegative. Given her profound and quick decline in gait, I am suggesting starting immune therapy (IVIG) to prevent further damage. Her brain MRI is normal which is typical for autoimmune disease.Will consider SCA testing in the future. F/u ataxia labwork. CT C/Ab/Pel to screen for malignancy.  Suggested aggressive PT.         Relevant Orders    CT Chest Abdomen Pelvis W W/O Contrast (XPD)    Vitamin B12 Deficiency Panel (Completed)    GLUTAMIC ACID DECARBOXYLASE (Completed)    LIPID PANEL    HEAVY METALS SCREEN, BLOOD (QUANTITATIVE)    FOLATE    AFP tumor marker    BLANCA Comprehensive Panel    RPR    Sedimentation rate    C-REACTIVE PROTEIN          Follow-up: 1 mo  Discussed the importance of ongoing exercise in efforts to improve mobility and balance.    Ondina Polo MD, MS [facility]  Department of Neurology  Movement Disorders      "

## 2019-08-24 LAB — VIT B12 SERPL-MCNC: 392 NG/L (ref 180–914)

## 2019-08-26 ENCOUNTER — PATIENT MESSAGE (OUTPATIENT)
Dept: NEUROLOGY | Facility: CLINIC | Age: 54
End: 2019-08-26

## 2019-08-27 ENCOUNTER — PATIENT MESSAGE (OUTPATIENT)
Dept: NEUROLOGY | Facility: CLINIC | Age: 54
End: 2019-08-27

## 2019-08-27 LAB
GAD65 AB SER-SCNC: 0 NMOL/L
METHYLMALONATE SERPL-SCNC: 0.11 NMOL/ML

## 2019-08-30 ENCOUNTER — PATIENT MESSAGE (OUTPATIENT)
Dept: NEUROLOGY | Facility: CLINIC | Age: 54
End: 2019-08-30

## 2019-08-30 ENCOUNTER — HOSPITAL ENCOUNTER (OUTPATIENT)
Dept: RADIOLOGY | Facility: HOSPITAL | Age: 54
Discharge: HOME | End: 2019-08-30
Attending: PSYCHIATRY & NEUROLOGY
Payer: COMMERCIAL

## 2019-08-30 DIAGNOSIS — R27.0 ATAXIA: ICD-10-CM

## 2019-08-30 LAB
CREAT SERPL-MCNC: 0.6 MG/DL (ref 0.5–1.4)
SAMPLE: NORMAL

## 2019-08-30 PROCEDURE — 71260 CT CHEST ABDOMEN PELVIS WITH CONTRAST (XPD): ICD-10-PCS | Mod: 26,,, | Performed by: RADIOLOGY

## 2019-08-30 PROCEDURE — 74177 CT CHEST ABDOMEN PELVIS WITH CONTRAST (XPD): ICD-10-PCS | Mod: 26,,, | Performed by: RADIOLOGY

## 2019-08-30 PROCEDURE — 71260 CT THORAX DX C+: CPT | Mod: 26,,, | Performed by: RADIOLOGY

## 2019-08-30 PROCEDURE — 25500020 PHARM REV CODE 255: Performed by: PSYCHIATRY & NEUROLOGY

## 2019-08-30 PROCEDURE — 74177 CT ABD & PELVIS W/CONTRAST: CPT | Mod: 26,,, | Performed by: RADIOLOGY

## 2019-08-30 PROCEDURE — 74177 CT ABD & PELVIS W/CONTRAST: CPT | Mod: TC

## 2019-08-30 RX ADMIN — IOHEXOL 100 ML: 350 INJECTION, SOLUTION INTRAVENOUS at 01:08

## 2019-09-03 ENCOUNTER — TELEPHONE (OUTPATIENT)
Dept: NEUROLOGY | Facility: CLINIC | Age: 54
End: 2019-09-03

## 2019-09-03 DIAGNOSIS — R27.0 ATAXIA: Primary | ICD-10-CM

## 2019-09-04 ENCOUNTER — TELEPHONE (OUTPATIENT)
Dept: MEDSURG UNIT | Facility: OTHER | Age: 54
End: 2019-09-04

## 2019-09-04 NOTE — TELEPHONE ENCOUNTER
----- Message from Arlette Diamond sent at 9/3/2019  4:02 PM CDT -----    There was an issue with a test ordered on this patient from 08/30/2019. Please call the Sendout lab as soon as possible at [phone] ext. [phone] for complete details.  Anyone in the Sendout lab will be able to assist you with further information.

## 2019-09-06 ENCOUNTER — PATIENT MESSAGE (OUTPATIENT)
Dept: NEUROLOGY | Facility: CLINIC | Age: 54
End: 2019-09-06

## 2019-09-06 ENCOUNTER — TELEPHONE (OUTPATIENT)
Dept: NEUROLOGY | Facility: CLINIC | Age: 54
End: 2019-09-06

## 2019-09-06 NOTE — TELEPHONE ENCOUNTER
Placed call to Niurka baires. PA is being processed thru insurance since 9/5/19. Response time is about 72 hours.

## 2019-09-08 DIAGNOSIS — E53.8 VITAMIN B12 DEFICIENCY: ICD-10-CM

## 2019-09-08 DIAGNOSIS — R27.0 ATAXIA: Primary | ICD-10-CM

## 2019-09-09 ENCOUNTER — TELEPHONE (OUTPATIENT)
Dept: NEUROLOGY | Facility: CLINIC | Age: 54
End: 2019-09-09

## 2019-09-09 NOTE — TELEPHONE ENCOUNTER
Spoke with patient and advised of lab IGA needing to be done. She can do this closer to home at any [facility] location.

## 2019-09-10 ENCOUNTER — LAB VISIT (OUTPATIENT)
Dept: LAB | Facility: HOSPITAL | Age: 54
End: 2019-09-10
Attending: PSYCHIATRY & NEUROLOGY
Payer: COMMERCIAL

## 2019-09-10 DIAGNOSIS — R27.0 ATAXIA: ICD-10-CM

## 2019-09-10 PROCEDURE — 36415 COLL VENOUS BLD VENIPUNCTURE: CPT

## 2019-09-10 PROCEDURE — 82784 ASSAY IGA/IGD/IGG/IGM EACH: CPT

## 2019-09-10 PROCEDURE — 82300 ASSAY OF CADMIUM: CPT

## 2019-09-11 LAB — IGA SERPL-MCNC: 269 MG/DL (ref 40–350)

## 2019-09-12 ENCOUNTER — PATIENT MESSAGE (OUTPATIENT)
Dept: NEUROLOGY | Facility: CLINIC | Age: 54
End: 2019-09-12

## 2019-09-12 LAB
ARSENIC BLD-MCNC: <1 NG/ML (ref 0–12)
CADMIUM BLD-MCNC: 0.3 NG/ML (ref 0–4.9)
CITY: NORMAL
COUNTY: NORMAL
GUARDIAN FIRST NAME: NORMAL
GUARDIAN LAST NAME: NORMAL
HOME PHONE: NORMAL
LEAD BLDV-MCNC: <1 MCG/DL (ref 0–4.9)
MERCURY BLD-MCNC: <1 NG/ML (ref 0–9)
RACE: NORMAL
STATE: NORMAL
STREET ADDRESS: NORMAL
VENOUS/CAPILLARY: NORMAL
ZIP: NORMAL

## 2019-10-02 ENCOUNTER — PATIENT MESSAGE (OUTPATIENT)
Dept: NEUROLOGY | Facility: CLINIC | Age: 54
End: 2019-10-02

## 2019-10-07 ENCOUNTER — PATIENT MESSAGE (OUTPATIENT)
Dept: NEUROLOGY | Facility: CLINIC | Age: 54
End: 2019-10-07

## 2019-10-08 ENCOUNTER — DOCUMENTATION ONLY (OUTPATIENT)
Dept: NEUROLOGY | Facility: CLINIC | Age: 54
End: 2019-10-08

## 2019-10-08 ENCOUNTER — PATIENT MESSAGE (OUTPATIENT)
Dept: NEUROLOGY | Facility: CLINIC | Age: 54
End: 2019-10-08

## 2019-10-08 ENCOUNTER — TELEPHONE (OUTPATIENT)
Dept: NEUROLOGY | Facility: CLINIC | Age: 54
End: 2019-10-08

## 2019-10-08 NOTE — TELEPHONE ENCOUNTER
----- Message from Samantha Calderon sent at 10/8/2019  3:42 PM CDT -----  Contact: Sindy ([facility]) @ [phone]  Caller asking to speak with someone in Dr. Polo's office regarding getting an appt for a peer to peer for patient, says the request is in response to the urgent denial. Please call.

## 2019-10-08 NOTE — PROGRESS NOTES
Results for RADHA HAHN (MRN [id number]) as of 10/8/2019 12:00   Ref. Range 8/23/2019 11:34 8/30/2019 11:07 8/30/2019 12:34 8/30/2019 13:39 9/10/2019 15:55   Folate Latest Ref Range: 4.0 - 24.0 ng/mL  7.7      Vitamin B-12 Latest Ref Range: 180 - 914 ng/L 392       Sed Rate Latest Ref Range: 0 - 36 mm/Hr  19      CRP Latest Ref Range: 0.0 - 8.2 mg/L  0.4      Triglycerides Latest Ref Range: 30 - 150 mg/dL  54      Cholesterol Latest Ref Range: 120 - 199 mg/dL  198      HDL Latest Ref Range: 40 - 75 mg/dL  65      Hdl/Cholesterol Ratio Latest Ref Range: 20.0 - 50.0 %  32.8      LDL Cholesterol External Latest Ref Range: 63.0 - 159.0 mg/dL  122.2      Non-HDL Cholesterol Latest Units: mg/dL  133      Total Cholesterol/HDL Ratio Latest Ref Range: 2.0 - 5.0   3.0      Arsenic Latest Ref Range: 0 - 12 ng/mL  Test Not Performed   <1   B12 Def. Methylmalonic Acid Latest Ref Range: <=0.40 nmol/mL 0.11       Glutamic Acid Decarb Ab Latest Ref Range: <=0.02 nmol/L 0.00       AFP Latest Ref Range: 0.0 - 8.4 ng/mL  1.9      Race Unknown  Test Not Performed   Test Not Performed   IgA Latest Ref Range: 40 - 350 mg/dL     269   RPR Latest Ref Range: Non-reactive   Non-reactive      Lead Latest Ref Range: 0.0 - 4.9 mcg/dL  Test Not Performed   <1.0   POC Creatinine Latest Ref Range: 0.5 - 1.4 mg/dL   0.6     Sample Unknown   VENOUS     CT CHEST ABDOMEN PELVIS WITH CONTRAST (XPD) Unknown    Rpt    Cadmium Latest Ref Range: 0.0 - 4.9 ng/mL  Test Not Performed   0.3   City Unknown  Test Not Performed   Test Not Performed   County Unknown  Test Not Performed   Test Not Performed   Guardian First Name Unknown  Test Not Performed   Test Not Performed   Guardian Last Name Unknown  Test Not Performed   Test Not Performed   Home Phone Unknown  Test Not Performed   Test Not Performed   [place]ury Latest Ref Range: 0 - 9 ng/mL  Test Not Performed   <1   State Unknown  Test Not Performed   Test Not Performed   Street Address Unknown  Test Not  Performed   Test Not Performed   Venous/Capillary Unknown  Venous   VENOUS   Zip Unknown  Test Not Performed   Test Not Performed     Results for RADHA HAHN (MRN [id number]) as of 10/8/2019 12:00   Ref. Range 5/2/2019 11:23 6/3/2019 10:27   Acetylchol Modul Ab Latest Units: %  0   AChR Binding Ab, Serum Latest Ref Range: <=0.02 nmol/L 0.00 0.00   AChR Ganglionic Neuronal Ab Latest Ref Range: <=0.02 nmol/L 0.00    CRMP-5 IgG Latest Ref Range: <1:240 titer Negative    MG Interpretive Comments Unknown  SEE BELOW   MuSK Antibody Test Latest Ref Range: 0.00 - 0.02 nmol/L  0.00   Neuronal (V-G) K+ Channel Ab, Serum Latest Ref Range: <=0.02 nmol/L 0.00    NMO Interpretive Comments Unknown SEE BELOW    N-Type Calcium Channel Ab Latest Ref Range: <=0.03 nmol/L 0.00    P/Q Type Calcium Channel Ab Latest Ref Range: <=0.02 nmol/L 0.00    PAVAL AGNA-1, Serum Latest Ref Range: <1:240 titer Negative    PAVAL TOBY-1, Serum Latest Ref Range: <1:240 titer Negative    PAVAL TOBY-2, Serum Latest Ref Range: <1:240 titer Negative    PAVAL TOBY-3, Serum Latest Ref Range: <1:240 titer Negative    PAVAL reflex test added Unknown None.    PAVAL,  Amphiphysin Ab, Serum Latest Ref Range: <1:240 titer Negative    PAVAL, PCA-1, Serum Latest Ref Range: <1:240 titer Negative    PAVAL, PCA-2, Serum Latest Ref Range: <1:240 titer Negative    PAVAL, PCA-Tr, Serum Latest Ref Range: <1:240 titer Negative    STRIATED MUSCLE AB Latest Ref Range: <1:120 titer Negative Negative

## 2019-10-09 ENCOUNTER — TELEPHONE (OUTPATIENT)
Dept: NEUROLOGY | Facility: CLINIC | Age: 54
End: 2019-10-09

## 2019-10-09 NOTE — TELEPHONE ENCOUNTER
----- Message from Saul Winkler sent at 10/9/2019 12:58 PM CDT -----  Contact: Dr. Josh combs/ [facility] @ [phone]  Dr. Funez is calling to complete peer to peer for IVIG

## 2019-10-18 ENCOUNTER — PATIENT MESSAGE (OUTPATIENT)
Dept: NEUROLOGY | Facility: CLINIC | Age: 54
End: 2019-10-18

## 2019-10-28 ENCOUNTER — PATIENT MESSAGE (OUTPATIENT)
Dept: NEUROLOGY | Facility: CLINIC | Age: 54
End: 2019-10-28

## 2019-11-06 ENCOUNTER — PATIENT MESSAGE (OUTPATIENT)
Dept: NEUROLOGY | Facility: CLINIC | Age: 54
End: 2019-11-06

## 2019-11-07 ENCOUNTER — PATIENT MESSAGE (OUTPATIENT)
Dept: NEUROLOGY | Facility: CLINIC | Age: 54
End: 2019-11-07

## 2019-11-08 ENCOUNTER — PATIENT MESSAGE (OUTPATIENT)
Dept: NEUROLOGY | Facility: CLINIC | Age: 54
End: 2019-11-08

## 2019-11-11 ENCOUNTER — TELEPHONE (OUTPATIENT)
Dept: NEUROLOGY | Facility: CLINIC | Age: 54
End: 2019-11-11

## 2019-11-11 NOTE — TELEPHONE ENCOUNTER
----- Message from Chelsie Storm sent at 11/11/2019 12:07 PM CST -----  Contact: [facility]  Calling to get complete order for the Gamunex.  Please resubmit and call [phone] option 2 if there are any questions

## 2019-11-11 NOTE — TELEPHONE ENCOUNTER
Spoke with Kathleen from [facility].  . I informed her I spoke with Callum previously, he is going to fax some forms for Dr. Polo to complete.Kathleen was informed Dr. Polo has the forms, they are completed. I am needing the fax number where forms needs to be sent. Kathleen gave fax number where to send forms for Gumanex. Kathleen states she will update Callum on paperwork being faxed.

## 2019-11-14 ENCOUNTER — TELEPHONE (OUTPATIENT)
Dept: NEUROLOGY | Facility: CLINIC | Age: 54
End: 2019-11-14

## 2019-11-14 NOTE — TELEPHONE ENCOUNTER
----- Message from Sydnie Duarte sent at 11/13/2019  4:45 PM CST -----  Contact: Liset    [facility]    [phone] option 2  Calling to request an order for nursing for home infusion.  Pls call with a verbal.

## 2019-11-26 ENCOUNTER — OFFICE VISIT (OUTPATIENT)
Dept: NEUROLOGY | Facility: CLINIC | Age: 54
End: 2019-11-26
Payer: COMMERCIAL

## 2019-11-26 VITALS
HEART RATE: 88 BPM | WEIGHT: 194 LBS | SYSTOLIC BLOOD PRESSURE: 132 MMHG | HEIGHT: 67 IN | DIASTOLIC BLOOD PRESSURE: 91 MMHG | BODY MASS INDEX: 30.45 KG/M2

## 2019-11-26 DIAGNOSIS — R27.0 ATAXIA: Primary | ICD-10-CM

## 2019-11-26 PROCEDURE — 99214 OFFICE O/P EST MOD 30 MIN: CPT | Mod: S$GLB,,, | Performed by: PSYCHIATRY & NEUROLOGY

## 2019-11-26 PROCEDURE — 99214 PR OFFICE/OUTPT VISIT, EST, LEVL IV, 30-39 MIN: ICD-10-PCS | Mod: S$GLB,,, | Performed by: PSYCHIATRY & NEUROLOGY

## 2019-11-26 PROCEDURE — 3008F PR BODY MASS INDEX (BMI) DOCUMENTED: ICD-10-PCS | Mod: CPTII,S$GLB,, | Performed by: PSYCHIATRY & NEUROLOGY

## 2019-11-26 PROCEDURE — 99999 PR PBB SHADOW E&M-EST. PATIENT-LVL III: ICD-10-PCS | Mod: PBBFAC,,, | Performed by: PSYCHIATRY & NEUROLOGY

## 2019-11-26 PROCEDURE — 3008F BODY MASS INDEX DOCD: CPT | Mod: CPTII,S$GLB,, | Performed by: PSYCHIATRY & NEUROLOGY

## 2019-11-26 PROCEDURE — 99999 PR PBB SHADOW E&M-EST. PATIENT-LVL III: CPT | Mod: PBBFAC,,, | Performed by: PSYCHIATRY & NEUROLOGY

## 2019-11-26 RX ORDER — BACLOFEN 10 MG/1
5 TABLET ORAL 3 TIMES DAILY
Qty: 45 TABLET | Refills: 11 | Status: SHIPPED | OUTPATIENT
Start: 2019-11-26 | End: 2021-05-17

## 2019-11-26 RX ORDER — MULTIVIT WITH MINERALS/HERBS
1 TABLET ORAL DAILY
COMMUNITY

## 2019-11-26 RX ORDER — METHYLPREDNISOLONE 4 MG/1
TABLET ORAL
Qty: 1 PACKAGE | Refills: 0 | Status: SHIPPED | OUTPATIENT
Start: 2019-11-26 | End: 2019-12-17

## 2019-11-26 NOTE — ASSESSMENT & PLAN NOTE
Moderate progressive ataxia, which fits criteria for autoimmune ataxia in speed of onset, and now response to IVIG.  Brain MRI repeat shows no cerebellar dz. Cspine not impinged. Lab workup including paraneoplastic, David 65 neg. Will f/u Gliadin and TPO labs. CT C/ab/Pel neg for occult malignancy.  Will start a Medrol dose pack followed by an immunomodulator per typical autoimmune protocol.   Will also send genetic testing, however no family history.

## 2019-11-26 NOTE — PROGRESS NOTES
"MOVEMENT DISORDERS CLINIC Followup  PCP/Referring Provider: Aaarefeneal Self  No address on file  Date of Service: 11/26/2019    Chief Complaint: Ataxia    Interval hx  Since last visit ahs 2 rounds of IVIG    She feels like her hand coordination is much better. 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    Continues to have decreased R arm swing  Continues to have imbalance but she feels more confident. She walk all day long at work. She works as a nurse.  Trouble with stairs, going down, however has R knee pain    Has been going to PT off and on  No numbness or tingling in feet  Her voice is mildly slurred but not progressive  No trouble swallowing    MRI brain showed no lesions or cerebellar atrophy  PAGE scans showed normal uptake  EMG was normal  labwork negative thus far    She continues to get B12 injections  Notes her son was diagnosed with PANDAS and has funny eye movements - poor fine motor skills as a kid    "PriorHPI: Nirali Kirkpatrick is a L HANDED 54 y.o. female with a medical issues significant for HTN, Anxiety, Vit B12 deficiency, who presents with ataxia for 1.5 years. She first noted 1.5 years ago her R foot was dragging and every 3-4 steps she would catch that toe. She noticed her R foot would catch on curbs. Going down steps is especially hard - she mis-steps often. She feels like she has progressively been imbalanced. Her first fall was a year ago. She has fallen more often since then. She falls twice a month now. Falls typically sideways. She holds onto her  for stability but does not use an assist device. Tends to have more ataxia after she's tired. She occasionally has a had tremor in her hands when starting IVs. She does at times feel clumsy with her hands. No spinning vertigo. No lightheadedness. No visual issues.    No double vision, eye drooping, head drop, dysphagia.  She was told her B12 was low normal Aug 2018, and has had " "4 shots since    No dysarthria.    She saw a general neurologist  B6 - NL  B12 - 333    Brain MRI read as normal  Spine and Tspine 2018 - no central disc issues  Has not had an EMG    No fam hx ataxia, MS  Uncle has M. Gravis  Mother and brother have had tremors    PD Review of Symptoms:  Anosmia: None  Dysarthria/Hypophonia: None  Dysphagia/Sialorrhea: None  Hallucinations: None  Depression: None  Cognitive slowing: None  Urinary changes: None  Constipation: at times  Orthostasis: none  Falls: as above  Micrographia: none  Sleep issues:  -SUSAN: at times snores  -RBD: Talks and punches in her sleep  -Sleep Quality: good, wakes every 3 hours"    Review of Systems:   Review of Systems   Constitutional: Negative for fever.   HENT: Negative for congestion.    Eyes: Negative for double vision.   Respiratory: Negative for cough and shortness of breath.    Cardiovascular: Negative for chest pain and leg swelling.   Gastrointestinal: Negative for nausea.   Genitourinary: Negative for dysuria.   Musculoskeletal: Positive for falls.   Skin: Negative for rash.   Neurological: Positive for tremors. Negative for speech change and headaches.   Psychiatric/Behavioral: Negative for depression.         Current Medications:  Outpatient Encounter Medications as of 11/26/2019   Medication Sig Dispense Refill    amLODIPine (NORVASC) 5 MG tablet Take 5 mg by mouth once daily.       b complex vitamins tablet Take 1 tablet by mouth once daily.      cyanocobalamin 1,000 mcg/mL injection 1,000 mcg every 30 days.       desvenlafaxine succinate (PRISTIQ) 100 MG Tb24 100 mg once daily.       multivitamin capsule Take 1 capsule by mouth once daily.      baclofen (LIORESAL) 10 MG tablet Take 0.5 tablets (5 mg total) by mouth 3 (three) times daily. 45 tablet 11    methylPREDNISolone (MEDROL DOSEPACK) 4 mg tablet use as directed 1 Package 0     No facility-administered encounter medications on file as of 11/26/2019.        Past Medical " "History:  HTN    Past Surgical History:  Gallbladder, gastric sleeve    Current Living Situation: home    Social:  Social History     Socioeconomic History    Marital status:      Spouse name: Not on file    Number of children: Not on file    Years of education: Not on file    Highest education level: Not on file   Occupational History    Not on file   Social Needs    Financial resource strain: Not on file    Food insecurity:     Worry: Not on file     Inability: Not on file    Transportation needs:     Medical: Not on file     Non-medical: Not on file   Tobacco Use    Smoking status: Never Smoker   Substance and Sexual Activity    Alcohol use: Not on file    Drug use: Not on file    Sexual activity: Not on file   Lifestyle    Physical activity:     Days per week: Not on file     Minutes per session: Not on file    Stress: Not on file   Relationships    Social connections:     Talks on phone: Not on file     Gets together: Not on file     Attends [employer] service: Not on file     Active member of club or organization: Not on file     Attends meetings of clubs or organizations: Not on file     Relationship status: Not on file   Other Topics Concern    Not on file   Social History Narrative    Not on file       Family History:  As above    PHYSICAL:  BP (!) 132/91 (BP Location: Right arm, Patient Position: Sitting)   Pulse 88   Ht 5' 7" (1.702 m)   Wt 88 kg (194 lb 0.1 oz)   BMI 30.39 kg/m²     General Medical Examination:  General: Good hygiene, appropriate appearance.  HEENT: Normocephalic, atraumatic.   Neck: Supple.   Chest: Unlabored breathing.   CV: Symmetric pulses.   Ext: No clubbing, cyanosis, or edema.     Mental Status:  Mood/Affect: Appropriate/congruent.  Level of consciousness: Awake, alert.  Orientation: Oriented to person, place, time and situation.  Language: No Dysarthria    Cranial nerves:  I: Not tested  II: PERRL, VFF to counting  III, IV, VI: EOMI with conjugate gaze " and no nystagmus on end gaze   V: Facial sensation intact and symmetric over the bilateral V1-V3  VII: Facial muscle activation intact and symmetric over the bilateral upper and lower face  VIII: Hearing intact in the b/l ears and symmetrical to finger rub  IX, X, XII: TUP midline - no atrophy or fasiculations  X: SCMs and shoulder shrug full strength b/l and symmetric  Can easily say PA PA TA TA  Trouble with GAs and Naomy     Motor:   Strength Intact throughout upper and lower extremities, 5/5.  No foot drop    DTRs:  ? Biceps Triceps Brachioradialis Knee Ankle   Left 2+ 2+ 2+ 3+ 2+   Right 2+ 2+ 2+ 3+ 2+   -Plantar reflex: NL    ? Finger taps Finger flicks PEDRO Heel taps   Left nl nl nl nl   Right nl nl nl nl   Neck tone: nl  ? Arm Leg   Left nl nl   Right nl nl     Sensation:   -Light touch: Intact and symmetric in the bilateral upper and lower extremities.  -Temp: Intact and symmetric in the bilateral upper and lower extremities.  -Vibration: mild vibratory loss to mid-shin    Coordination:   -Finger to nose: intention tremor mild bilat  Past-points 1 inch R hand >L hand  Disdiatochokinesias R hand moderate  -Heel to shin: mild issues R foot  R hand spiral poor  L hand spiral poor    No resting or postural tremor    Gait:  -Arises from chair without use of hands.  -Casual gait is: wide based, somewhat stiff, circumducts, waves moderately L to R when she walks - ataxic   -Stride length: nl  -Arm Swing: decreased R  -Turning: wide  -Tandem gait: cannot  No foot drop    Romberg: strongly POS    Laboratory Data:  Results for RADHA HAHN (MRN [id number]) as of 6/3/2019 09:52   Ref. Range 5/2/2019 11:23   Folate Latest Ref Range: 4.0 - 24.0 ng/mL 10.7   Vitamin B-12 Latest Ref Range: 180 - 914 ng/L 525   Sodium Latest Ref Range: 136 - 145 mmol/L 141   Potassium Latest Ref Range: 3.5 - 5.1 mmol/L 3.7   Chloride Latest Ref Range: 95 - 110 mmol/L 106   CO2 Latest Ref Range: 23 - 29 mmol/L 26   Anion Gap Latest Ref  Range: 8 - 16 mmol/L 9   BUN, Bld Latest Ref Range: 6 - 20 mg/dL 22 (H)   Creatinine Latest Ref Range: 0.5 - 1.4 mg/dL 0.8   eGFR if non African American Latest Ref Range: >60 mL/min/1.73 m^2 >60.0   eGFR if African American Latest Ref Range: >60 mL/min/1.73 m^2 >60.0   Glucose Latest Ref Range: 70 - 110 mg/dL 92   Calcium Latest Ref Range: 8.7 - 10.5 mg/dL 9.8   Alkaline Phosphatase Latest Ref Range: 55 - 135 U/L 86   PROTEIN TOTAL Latest Ref Range: 6.0 - 8.4 g/dL 7.4   Albumin Latest Ref Range: 3.5 - 5.2 g/dL 4.2   BILIRUBIN TOTAL Latest Ref Range: 0.1 - 1.0 mg/dL 0.6   AST Latest Ref Range: 10 - 40 U/L 19   ALT Latest Ref Range: 10 - 44 U/L 16   Ammonia Latest Ref Range: 10 - 50 umol/L 26   Arsenic Latest Ref Range: 0 - 12 ng/mL <1   Thiamine Latest Ref Range: 38 - 122 ug/L 63   Vitamin E Latest Ref Range: 500 - 1800 ug/dL 1302   Glutamic Acid Decarb Ab Latest Ref Range: <=0.02 nmol/L 0.00   TSH Latest Ref Range: 0.400 - 4.000 uIU/mL 0.649   PTH Latest Ref Range: 9.0 - 77.0 pg/mL 103.0 (H)   Race Unknown Test Not Performed   AChR Binding Ab, Serum Latest Ref Range: <=0.02 nmol/L 0.00   AChR Ganglionic Neuronal Ab Latest Ref Range: <=0.02 nmol/L 0.00   CRMP-5 IgG Latest Ref Range: <1:240 titer Negative   Neuronal (V-G) K+ Channel Ab, Serum Latest Ref Range: <=0.02 nmol/L 0.00   NMO Interpretive Comments Unknown SEE BELOW   N-Type Calcium Channel Ab Latest Ref Range: <=0.03 nmol/L 0.00   P/Q Type Calcium Channel Ab Latest Ref Range: <=0.02 nmol/L 0.00   PAVAL AGNA-1, Serum Latest Ref Range: <1:240 titer Negative   PAVAL TOBY-1, Serum Latest Ref Range: <1:240 titer Negative   PAVAL TOBY-2, Serum Latest Ref Range: <1:240 titer Negative   PAVAL TOBY-3, Serum Latest Ref Range: <1:240 titer Negative   PAVAL reflex test added Unknown None.   PAVAL,  Amphiphysin Ab, Serum Latest Ref Range: <1:240 titer Negative   PAVAL, PCA-1, Serum Latest Ref Range: <1:240 titer Negative   PAVAL, PCA-2, Serum Latest Ref Range: <1:240  titer Negative   PAVAL, PCA-Tr, Serum Latest Ref Range: <1:240 titer Negative   STRIATED MUSCLE AB Latest Ref Range: <1:120 titer Negative   Lead Latest Ref Range: 0.0 - 4.9 mcg/dL <1.0   Cadmium Latest Ref Range: 0.0 - 4.9 ng/mL 0.3   City Unknown Test Not Performed   County Unknown Test Not Performed   Guardian First Name Unknown Test Not Performed   Guardian Last Name Unknown Test Not Performed   Home Phone Unknown Test Not Performed   [place] Latest Ref Range: 0 - 9 ng/mL 2   State Unknown Test Not Performed   Street Address Unknown Test Not Performed   Venous/Capillary Unknown Test Not Performed   Zip Unknown Test Not Performed      Results for RADHA HAHN (MRN [id number]) as of 8/23/2019 10:10   Ref. Range 6/3/2019 10:27   Sed Rate Latest Ref Range: 0 - 36 mm/Hr 13   CRP Latest Ref Range: 0.0 - 8.2 mg/L 0.5   CPK Latest Ref Range: 20 - 180 U/L 55   CERULOPLASMIN Latest Ref Range: 15.0 - 45.0 mg/dL 33.0   Acetylchol Modul Ab Latest Units: % 0   AChR Binding Ab, Serum Latest Ref Range: <=0.02 nmol/L 0.00   MG Interpretive Comments Unknown SEE BELOW   MuSK Antibody Test Latest Ref Range: 0.00 - 0.02 nmol/L 0.00   STRIATED MUSCLE AB Latest Ref Range: <1:120 titer Negative   Aldolase Latest Ref Range: 1.2 - 7.6 U/L 3.5       Imaging:  Brain MRI w/o cerebellar degeneration or lesion - however poor quality MRI  Repeat brain MRI  FINDINGS:  MRI of the brain demonstrates no infarction, mass, hemorrhage, extra-axial collection, or other acute finding.  A skull base lesions are seen.  Following contrast administration, there is no abnormal enhancing lesion identified.  A small incidental developmental venous anomaly is seen in the right medial posterior thalamus.  Cspine w/o without central impingement    EMG  Summary   Nerve conduction study of bilateral lower extremities revealed normal antidromic sensory peak latencies, action potentials, and conduction velocities.   Motor peak latencies, amplitudes, and  conduction velocities were normal. F-waves were normal.   Concentric needle examination of selected muscles in bilateral lower extremities revealed no evidence of acute or chronic denervation.   Impression   This is a normal study.     Assessment//Plan:   Problem List Items Addressed This Visit        Neuro    Ataxia - Primary    Current Assessment & Plan     Moderate progressive ataxia, which fits criteria for autoimmune ataxia in speed of onset, and now response to IVIG.  Brain MRI repeat shows no cerebellar dz. Cspine not impinged. Lab workup including paraneoplastic, David 65 neg. Will f/u Gliadin and TPO labs. CT C/ab/Pel neg for occult malignancy.  Will start a Medrol dose pack followed by an immunomodulator per typical autoimmune protocol.   Will also send genetic testing, however no family history.           Relevant Orders    THYROID PEROXIDASE ANTIBODY    Celiac Disease Panel          Follow-up: 1 mo  Discussed the importance of ongoing exercise in efforts to improve mobility and balance.    Ondina Polo MD, MS [facility]  Department of Neurology  Movement Disorders

## 2019-11-27 ENCOUNTER — PATIENT MESSAGE (OUTPATIENT)
Dept: NEUROLOGY | Facility: CLINIC | Age: 54
End: 2019-11-27

## 2019-11-27 DIAGNOSIS — R27.0 ATAXIA: ICD-10-CM

## 2019-11-27 DIAGNOSIS — R76.8 ANTI-TPO ANTIBODIES PRESENT: Primary | ICD-10-CM

## 2019-12-06 DIAGNOSIS — R27.0 ATAXIA: Primary | ICD-10-CM

## 2019-12-09 ENCOUNTER — LAB VISIT (OUTPATIENT)
Dept: LAB | Facility: HOSPITAL | Age: 54
End: 2019-12-09
Attending: PSYCHIATRY & NEUROLOGY
Payer: COMMERCIAL

## 2019-12-09 DIAGNOSIS — R27.0 ATAXIA: ICD-10-CM

## 2019-12-09 LAB — THYROGLOB AB SERPL IA-ACNC: 10.4 IU/ML (ref 0–3.9)

## 2019-12-09 PROCEDURE — 86800 THYROGLOBULIN ANTIBODY: CPT

## 2019-12-09 PROCEDURE — 84445 ASSAY OF TSI GLOBULIN: CPT

## 2019-12-10 ENCOUNTER — PATIENT MESSAGE (OUTPATIENT)
Dept: NEUROLOGY | Facility: CLINIC | Age: 54
End: 2019-12-10

## 2019-12-11 LAB — TSI SER-ACNC: <0.1 IU/L

## 2019-12-16 ENCOUNTER — PATIENT MESSAGE (OUTPATIENT)
Dept: ENDOCRINOLOGY | Facility: CLINIC | Age: 54
End: 2019-12-16

## 2019-12-16 ENCOUNTER — OFFICE VISIT (OUTPATIENT)
Dept: ENDOCRINOLOGY | Facility: CLINIC | Age: 54
End: 2019-12-16
Payer: COMMERCIAL

## 2019-12-16 VITALS
WEIGHT: 193.44 LBS | BODY MASS INDEX: 30.36 KG/M2 | HEIGHT: 67 IN | HEART RATE: 76 BPM | SYSTOLIC BLOOD PRESSURE: 120 MMHG | RESPIRATION RATE: 18 BRPM | DIASTOLIC BLOOD PRESSURE: 98 MMHG

## 2019-12-16 DIAGNOSIS — R27.0 ATAXIA: ICD-10-CM

## 2019-12-16 DIAGNOSIS — E06.3 HASHIMOTO'S THYROIDITIS: Primary | ICD-10-CM

## 2019-12-16 DIAGNOSIS — E21.3 HYPERPARATHYROIDISM: ICD-10-CM

## 2019-12-16 DIAGNOSIS — R76.8 ANTI-TPO ANTIBODIES PRESENT: ICD-10-CM

## 2019-12-16 PROCEDURE — 99999 PR PBB SHADOW E&M-EST. PATIENT-LVL IV: CPT | Mod: PBBFAC,,, | Performed by: INTERNAL MEDICINE

## 2019-12-16 PROCEDURE — 99999 PR PBB SHADOW E&M-EST. PATIENT-LVL IV: ICD-10-PCS | Mod: PBBFAC,,, | Performed by: INTERNAL MEDICINE

## 2019-12-16 PROCEDURE — 99244 PR OFFICE CONSULTATION,LEVEL IV: ICD-10-PCS | Mod: S$GLB,,, | Performed by: INTERNAL MEDICINE

## 2019-12-16 PROCEDURE — 99244 OFF/OP CNSLTJ NEW/EST MOD 40: CPT | Mod: S$GLB,,, | Performed by: INTERNAL MEDICINE

## 2019-12-16 NOTE — LETTER
December 16, 2019      Ondina Polo MD  [address]  [facility] [address]           [place] - [address]  [address]  [facility] [address]  Phone: [phone]  Fax: [phone]          Patient: Nirali Kirkpatrick   MR Number: [id number]   YOB: 1965   Date of Visit: 12/16/2019       Dear Dr. Ondina Polo:    Thank you for referring Nirali Kirkpatrick to me for evaluation. Attached you will find relevant portions of my assessment and plan of care.    If you have questions, please do not hesitate to call me. I look forward to following Nirali Kirkpatrick along with you.    Sincerely,    Neha Mcgowan MD    Enclosure  CC:  No Recipients    If you would like to receive this communication electronically, please contact [email or web address] or [phone] to request more information on [employer] Link access.    For providers and/or their staff who would like to refer a patient to [facility], please contact us through our one-stop-shop provider referral line, [facility], at [phone].    If you feel you have received this communication in error or would no longer like to receive these types of communications, please e-mail [email or web address]

## 2019-12-16 NOTE — PROGRESS NOTES
Nirali Kirkpatrick is a 54 y.o. 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 Ondina Polo for evaluation of Hashimoto's thyroiditis    History of Present Illness  Hashimoto's thyroiditis  Diagnosed 2019 during evaluation of AI ataxia by neurology  TSH in 2015 low, maybe around time of cholecystectomy. She was not told about this and never treated  Lab Results   Component Value Date    TSH 0.649 05/02/2019     Weight: weight gain over last 2 years but unable to exercsie  Fatigue: + but works 12+ hour shifts  Cold intolerance: denies, now with hot flashes related to menopause  Bowel movements: constipation  Dry skin: denies  Hair loss: following gastric surgery    Menses: stoppped around age 53    Denied neck enlargement, dysphagia.  Some changes in voice which she relates to ataxia    Denies family history of thyroid disease    Component      Latest Ref Rng & Units 12/9/2019 11/26/2019   Thyroperoxidase Antibodies      <6.0 IU/mL  87.7 (H)   Thyroglobulin Ab Screen      0.0 - 3.9 IU/mL 10.4 (H)    Thyroid-Stim IG Quantitative      <0.10 IU/L <0.10        Elevated PTH in 5/2019. Normal in care everywhere 2015  Calcium normal  No vitamin D available. Taking D3 daily- drops under tongue  Takes MVI daily      Current Outpatient Medications:     amLODIPine (NORVASC) 5 MG tablet, Take 5 mg by mouth once daily. , Disp: , Rfl:     b complex vitamins tablet, Take 1 tablet by mouth once daily., Disp: , Rfl:     baclofen (LIORESAL) 10 MG tablet, Take 0.5 tablets (5 mg total) by mouth 3 (three) times daily., Disp: 45 tablet, Rfl: 11    cyanocobalamin 1,000 mcg/mL injection, 1,000 mcg every 30 days. , Disp: , Rfl:     desvenlafaxine succinate (PRISTIQ) 100 MG Tb24, 100 mg once daily. , Disp: , Rfl:     multivitamin capsule, Take 1 capsule by mouth once daily., Disp: , Rfl:     methylPREDNISolone (MEDROL DOSEPACK) 4 mg tablet, use as directed, Disp: 1 Package, Rfl: 0    Review of Systems    Constitutional: Negative for fever.        Weight gain   HENT: Positive for voice change.    Eyes: Negative for pain.   Respiratory: Negative for shortness of breath.    Cardiovascular: Negative for chest pain and leg swelling.   Gastrointestinal: Negative for abdominal pain.   Genitourinary: Negative for dysuria.   Musculoskeletal: Negative for arthralgias.   Skin: Negative for rash.   Neurological: Negative for headaches.   Psychiatric/Behavioral: Negative for confusion.       Objective:     Vitals:    12/16/19 0833   BP: (!) 120/98   Pulse: 76   Resp: 18     Wt Readings from Last 3 Encounters:   12/16/19 87.8 kg (193 lb 7.3 oz)   11/26/19 88 kg (194 lb 0.1 oz)   08/23/19 88 kg (194 lb 0.1 oz)     Body mass index is 30.3 kg/m².  Physical Exam   Constitutional: She is oriented to person, place, and time. She appears well-developed and well-nourished.   HENT:   Head: Normocephalic.   Right Ear: External ear normal.   Left Ear: External ear normal.   Mouth/Throat: Oropharynx is clear and moist.   Eyes: Pupils are equal, round, and reactive to light. Conjunctivae and EOM are normal.   Neck: No tracheal deviation present. No thyromegaly present.   No nodules appreciated   Cardiovascular: Normal rate and regular rhythm.   No murmur heard.  No LE edema   Pulmonary/Chest: Effort normal and breath sounds normal.   Abdominal: Soft. She exhibits no distension and no mass. There is no tenderness. No hernia.   Neurological: She is alert and oriented to person, place, and time.   Stuttering gait with some imbalance upon standing   Skin: Skin is warm. No rash noted.   No nodules   Psychiatric: She has a normal mood and affect. Judgment normal.       Labs    Chemistry        Component Value Date/Time     05/02/2019 1123    K 3.7 05/02/2019 1123     05/02/2019 1123    CO2 26 05/02/2019 1123    BUN 22 (H) 05/02/2019 1123    CREATININE 0.8 05/02/2019 1123    GLU 92 05/02/2019 1123        Component Value Date/Time     CALCIUM 9.8 05/02/2019 1123    ALKPHOS 86 05/02/2019 1123    AST 19 05/02/2019 1123    ALT 16 05/02/2019 1123    BILITOT 0.6 05/02/2019 1123    ESTGFRAFRICA >60.0 05/02/2019 1123    EGFRNONAA >60.0 05/02/2019 1123              Assessment and Plan     Hashimoto's thyroiditis  Discussed natural history of Hashimoto's thyroiditis and risk of development of hypothyroidism in future. I do not think elevated TPO antibodies are causing ataxia and more likely are marker of her predisposition to autoimmune disease  discussed indications for treatment of hypothyroidism  Repeat TSH today although clinically euthyroid  If normal, plan repeat annually which can be done with PCP if she prefers  Discussed selenium but she prefers not to take this which is reasonable as limited data on its efficacy      Hyperparathyroidism  Mildly elevated PTH with normal calcium, normal renal function. Suspect may be secondary and related to vit D deficiency although no previous values available  Repeat PTH along with vitamin D, renal panel  Continue vit D supplementation with titration pending results        RTC pending labs results    Neha Mcgowan MD

## 2019-12-16 NOTE — ASSESSMENT & PLAN NOTE
Mildly elevated PTH with normal calcium, normal renal function. Suspect may be secondary and related to vit D deficiency although no previous values available  Repeat PTH along with vitamin D, renal panel  Continue vit D supplementation with titration pending results

## 2019-12-16 NOTE — ASSESSMENT & PLAN NOTE
Discussed natural history of Hashimoto's thyroiditis and risk of development of hypothyroidism in future. I do not think elevated TPO antibodies are causing ataxia and more likely are marker of her predisposition to autoimmune disease  discussed indications for treatment of hypothyroidism  Repeat TSH today although clinically euthyroid  If normal, plan repeat annually which can be done with PCP if she prefers  Discussed selenium but she prefers not to take this which is reasonable as limited data on its efficacy

## 2020-01-06 ENCOUNTER — PATIENT MESSAGE (OUTPATIENT)
Dept: NEUROLOGY | Facility: CLINIC | Age: 55
End: 2020-01-06

## 2020-01-08 DIAGNOSIS — E06.3 HASHIMOTO'S THYROIDITIS: ICD-10-CM

## 2020-01-08 DIAGNOSIS — R27.0 ATAXIA: Primary | ICD-10-CM

## 2020-01-10 ENCOUNTER — LAB VISIT (OUTPATIENT)
Dept: LAB | Facility: HOSPITAL | Age: 55
End: 2020-01-10
Attending: PSYCHIATRY & NEUROLOGY
Payer: COMMERCIAL

## 2020-01-10 DIAGNOSIS — E06.3 HASHIMOTO'S THYROIDITIS: ICD-10-CM

## 2020-01-10 DIAGNOSIS — R27.0 ATAXIA: ICD-10-CM

## 2020-01-10 PROCEDURE — 86480 TB TEST CELL IMMUN MEASURE: CPT

## 2020-01-14 LAB
GAMMA INTERFERON BACKGROUND BLD IA-ACNC: 0.01 IU/ML
M TB IFN-G CD4+ BCKGRND COR BLD-ACNC: 0.06 IU/ML
MITOGEN IGNF BCKGRD COR BLD-ACNC: 2.58 IU/ML
TB GOLD PLUS: NEGATIVE
TB2 - NIL: 0.06 IU/ML

## 2020-01-21 ENCOUNTER — PATIENT MESSAGE (OUTPATIENT)
Dept: NEUROLOGY | Facility: CLINIC | Age: 55
End: 2020-01-21

## 2020-01-21 RX ORDER — METHYLPREDNISOLONE 4 MG/1
TABLET ORAL
Qty: 1 PACKAGE | Refills: 0 | Status: SHIPPED | OUTPATIENT
Start: 2020-01-21 | End: 2020-02-11

## 2020-01-28 ENCOUNTER — OFFICE VISIT (OUTPATIENT)
Dept: INFECTIOUS DISEASES | Facility: CLINIC | Age: 55
End: 2020-01-28
Payer: COMMERCIAL

## 2020-01-28 ENCOUNTER — CLINICAL SUPPORT (OUTPATIENT)
Dept: INFECTIOUS DISEASES | Facility: CLINIC | Age: 55
End: 2020-01-28
Payer: COMMERCIAL

## 2020-01-28 DIAGNOSIS — Z23 NEED FOR SHINGLES VACCINE: ICD-10-CM

## 2020-01-28 DIAGNOSIS — R27.0 ATAXIA: Primary | ICD-10-CM

## 2020-01-28 DIAGNOSIS — Z71.85 VACCINE COUNSELING: ICD-10-CM

## 2020-01-28 DIAGNOSIS — R27.0 ATAXIA: ICD-10-CM

## 2020-01-28 DIAGNOSIS — Z23 NEED FOR PNEUMOCOCCAL VACCINATION: ICD-10-CM

## 2020-01-28 PROCEDURE — 90670 PCV13 VACCINE IM: CPT | Mod: S$GLB,,, | Performed by: INTERNAL MEDICINE

## 2020-01-28 PROCEDURE — 90471 PNEUMOCOCCAL CONJUGATE VACCINE 13-VALENT LESS THAN 5YO & GREATER THAN: ICD-10-PCS | Mod: S$GLB,,, | Performed by: INTERNAL MEDICINE

## 2020-01-28 PROCEDURE — 90750 HZV VACC RECOMBINANT IM: CPT | Mod: S$GLB,,, | Performed by: INTERNAL MEDICINE

## 2020-01-28 PROCEDURE — 90670 PNEUMOCOCCAL CONJUGATE VACCINE 13-VALENT LESS THAN 5YO & GREATER THAN: ICD-10-PCS | Mod: S$GLB,,, | Performed by: INTERNAL MEDICINE

## 2020-01-28 PROCEDURE — 99999 PR PBB SHADOW E&M-EST. PATIENT-LVL II: CPT | Mod: PBBFAC,,, | Performed by: INTERNAL MEDICINE

## 2020-01-28 PROCEDURE — 90472 ZOSTER RECOMBINANT VACCINE: ICD-10-PCS | Mod: S$GLB,,, | Performed by: INTERNAL MEDICINE

## 2020-01-28 PROCEDURE — 90471 IMMUNIZATION ADMIN: CPT | Mod: S$GLB,,, | Performed by: INTERNAL MEDICINE

## 2020-01-28 PROCEDURE — 99999 PR PBB SHADOW E&M-EST. PATIENT-LVL II: ICD-10-PCS | Mod: PBBFAC,,, | Performed by: INTERNAL MEDICINE

## 2020-01-28 PROCEDURE — 99203 PR OFFICE/OUTPT VISIT, NEW, LEVL III, 30-44 MIN: ICD-10-PCS | Mod: 25,S$GLB,, | Performed by: INTERNAL MEDICINE

## 2020-01-28 PROCEDURE — 99203 OFFICE O/P NEW LOW 30 MIN: CPT | Mod: 25,S$GLB,, | Performed by: INTERNAL MEDICINE

## 2020-01-28 PROCEDURE — 90472 IMMUNIZATION ADMIN EACH ADD: CPT | Mod: S$GLB,,, | Performed by: INTERNAL MEDICINE

## 2020-01-28 PROCEDURE — 90750 ZOSTER RECOMBINANT VACCINE: ICD-10-PCS | Mod: S$GLB,,, | Performed by: INTERNAL MEDICINE

## 2020-01-28 NOTE — LETTER
January 28, 2020      Ondina Polo MD  [address]  [facility] [address]           [facility] - Infectious [facility]  [address]  [facility] [address]  Phone: [phone]  Fax: [phone]          Patient: Nirali Kirkpatrick   MR Number: [id number]   YOB: 1965   Date of Visit: 1/28/2020       Dear Dr. Ondina Polo:    Thank you for referring Nirali Kirkpatrick to me for evaluation. Attached you will find relevant portions of my assessment and plan of care.    If you have questions, please do not hesitate to call me. I look forward to following Nirali Kirkpatrick along with you.    Sincerely,    Tania Bryant MD    Enclosure  CC:  No Recipients    If you would like to receive this communication electronically, please contact [email or web address] or [phone] to request more information on [employer] Link access.    For providers and/or their staff who would like to refer a patient to [facility], please contact us through our one-stop-shop provider referral line, [facility], at [phone].    If you feel you have received this communication in error or would no longer like to receive these types of communications, please e-mail [email or web address]

## 2020-01-28 NOTE — PROGRESS NOTES
Physical Exam  Anesthesia Plan  ASA Status: 2  Anesthesia Type: L&D Epidural  Reviewed: Lab Results, Allergies, Problem List, Consultations and Past Med History  The proposed anesthetic plan, including its risks and benefits, have been discussed with the Patient - along with the risks and benefits of alternatives.  Questions were encouraged and answered and the patient and/or representative agrees to proceed.  Blood Products: Not Anticipated      Anesthesia ROS/Med Hx        Neuro/Psych Review:    Pt. positive for headaches    Cardiovascular Review:    Pt. positive for hypertension  PIH   Recurrent headaches     Vitals:    11/28/17 1915   BP: 133/77   Pulse: 90   Resp: 16   Temp:         Pre Biologic Response Modifier Therapy Consult  BMR Recipient Evaluation    Requesting Physician: Dr. Polo (Neurology)    Reason for Visit:    Chief Complaint   Patient presents with    Gait Problem     History of Present Illness  Nirali Kirkpatrick is a 54 y.o. year old White female with advanced autoimmune ataxia currently being evaluated for prior to starting biologic response modifer (BRM) therapy with azathioprine.  Patient denies any recent fever, chills, or infective infective illnesses.      1) Do you have a history of:    YES NO   Diabetes                 []       [x]   Diabetic Foot Infection/Bone Infection  []  [x]   Surgical Removal of Spleen    []  [x]       2) Have you had recurrent infections involving:             YES NO  Sinus infections  []  [x]  Sore Throat   []  [x]                             Prostate Infections  []  [x]  .                         Bladder Infections  []  [x]                                 Kidney Infections  []  [x]        Intestinal Infections  []  [x]   Skin Infections   []  [x]                   Reproductive Infections               Periodontal Disease                   3)Have you ever had: YES     NO UNKNOWN      Chicken Pox   [x]  []  []                 Shingles   []  [x]  []                Orolabial Herpes             [x]  []  []       Genital Herpes  []  [x]  []           Cytomegalovirus  []  [x]  []               Rodrigue-Barr Virus  []  [x]  []              Genital Warts   []  [x]  []                Hepatitis A   []  [x]  []             Hepatitis B   []  [x]  []                Hepatitis C   []  [x]  []                 Syphilis   []  [x]  []                Gonorrhea   []  [x]  []                 Pelvic Inflammatory  []  [x]  []   Disease   []  [x]  []                    Chlamydia Infection  []  [x]  []                Intestinal parasites        or worms   []  [x]  []                 Fungal Infections  []  [x]  []                Blood Infections  []  [x]  []     Comment:        4) Have you ever been exposed   YES NO  to someone with tuberculosis?  [x]  []   If yes, what treatment did you receive:  X-ray, latent Tb by [facility]    5) What states have you lived in? [place], [place], [place], [place],     6) What countries have you visited for more than 2 weeks?      [place], [place]                      YES NO  7) Did you have any associated infections?     []  [x]     8) Are you planning to travel outside the           [place] after starting BRMs?    [x]   []           Household                  YES NO  9 Do you have pets living in your house?  []   []             If yes, describe: outside dogs    Do you spend time or live on a farm or                 have livestock or other farm animals?   [x]  []   If yes, which ones:    Do you have a fish tank?     []  [x]                       Do you have a litter box?     []  [x]                        Do you fish or hunt?  fishing    [x]  []                       Do you clean or skin fish or animals?   []  [x]                      Do you consume raw or undercooked                meat, fish, or shellfish?     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    []  [x]   13)Do you hike, camp, or spend   time in wooded areas?   [x]  []                           14) The patient's immunization history was reviewed.   Have you ever received:  YES NO UNKNOWN DATES  Routine Childhood vaccines  [x]  []  []                Influenza vaccine   [x]  []  []         Pneumonia    []  [x]  []      Tetanus-diptheria   [x]  []  []  2018  Hepatitis A vaccine series       []  []  [x]       Hepatitis B vaccine series       []  []  [x]        Meningitis vaccine   []  []  [x]     Varicella vaccine   []  []  [x]                  Based on the patients immunization history and serologies, immunizations were ordered:         Ordered  Not  Ordered  Influenza Vaccine     []    [x]   Hepatitis A series at 0, 6 months   []    [x]   Hepatitis B sries at 0, 1, and 6 months  []    [x]   Hepatitis B High Dose series 0,1, and 6 months []    [x]   Prevnar      [x]    []   Pneumovax      [x]    []   TDap       []    [x]   Zoster       [x]    []   Menactra      []    [x]   HiB       []    [x]               The patient was encouraged to contact us about any problems that may develop after immunization and possible side effects were reviewed.       Allergies: Patient has no known allergies.  Immunization History   Administered Date(s) Administered    Pneumococcal Conjugate - 13 Valent 01/28/2020    Zoster Recombinant 01/28/2020     Past Medical History:   Diagnosis Date    Depression     Hypertension      Past Surgical History:   Procedure Laterality Date    CHOLECYSTECTOMY      Gastric sleeve  2013    tubligation  2004      Social History     Socioeconomic History    Marital status:      Spouse name: Not on file    Number of children: Not on file    Years of education: Not on file    Highest education level: Not on file   Occupational History    Not on file   Social Needs    Financial resource strain: Not on file    Food insecurity:     Worry: Not on file     Inability: Not on file    Transportation needs:     Medical: Not on file     Non-medical: Not on file   Tobacco Use    Smoking status: Never Smoker   Substance and Sexual Activity    Alcohol use: Not Currently    Drug use: Never    Sexual activity: Not on file   Lifestyle    Physical activity:     Days per week: Not on file     Minutes per session: Not on file    Stress: Not on file   Relationships    Social connections:     Talks on phone: Not on file     Gets together: Not on file     Attends Worship service: Not on file     Active member of club or organization: Not on file     Attends meetings of clubs or organizations: Not on file     Relationship status: Not on file    Other Topics Concern    Not on file   Social History Narrative    Not on file       Review of Systems   Constitution: Negative for chills, decreased appetite, fever, malaise/fatigue, night sweats, weight gain and weight loss.   HENT: Negative for congestion, ear pain, hearing loss, hoarse voice, sore throat and tinnitus.    Eyes: Negative for blurred vision, redness and visual disturbance.   Cardiovascular: Negative for chest pain, leg swelling and palpitations.   Respiratory: Negative for cough, hemoptysis, shortness of breath and sputum production.    Hematologic/Lymphatic: Negative for adenopathy. Does not bruise/bleed easily.   Skin: Negative for dry skin, itching, rash and suspicious lesions.   Musculoskeletal: Negative for back pain, joint pain, myalgias and neck pain.   Gastrointestinal: Negative for abdominal pain, constipation, diarrhea, heartburn, nausea and vomiting.   Genitourinary: Negative for dysuria, flank pain, frequency, hematuria, hesitancy and urgency.   Neurological: Negative for dizziness, headaches, numbness, paresthesias and weakness.   Psychiatric/Behavioral: Negative for depression and memory loss. The patient does not have insomnia and is not nervous/anxious.      Physical Exam   Constitutional: She is oriented to person, place, and time. She appears well-developed and well-nourished. She is cooperative. She is easily aroused.  Non-toxic appearance. No distress.   HENT:   Head: Normocephalic and atraumatic. Head is without right periorbital erythema and without left periorbital erythema.   Right Ear: Hearing, tympanic membrane, external ear and ear canal normal. No swelling.   Left Ear: Hearing, tympanic membrane, external ear and ear canal normal. No swelling.   Nose: No nasal deformity.   Mouth/Throat: Uvula is midline and mucous membranes are normal.   Eyes: Conjunctivae and lids are normal. Right eye exhibits no discharge and no exudate. Left eye exhibits no discharge and no  exudate. Right conjunctiva is not injected. Left conjunctiva is not injected. No scleral icterus.   Cardiovascular: Normal rate, regular rhythm, S1 normal, S2 normal, normal heart sounds and intact distal pulses. Exam reveals no gallop and no friction rub.   No murmur heard.  Pulmonary/Chest: Effort normal and breath sounds normal. No accessory muscle usage or stridor. No tachypnea. No respiratory distress. She has no wheezes. She has no rales. She exhibits no tenderness.   Abdominal: Normal appearance.   Neurological: She is alert, oriented to person, place, and time and easily aroused.   Skin: Skin is warm and dry. No lesion and no rash noted. She is not diaphoretic. No cyanosis or erythema. No pallor. Nails show no clubbing.   Psychiatric: She has a normal mood and affect. Her speech is normal and behavior is normal. Judgment and thought content normal.   Nursing note and vitals reviewed.    Diagnostics:   RPR   Date Value Ref Range Status   08/30/2019 Non-reactive Non-reactive Final     No results found for: CMVANTIBODIE  No results found for: HIV1X2  No results found for: HTLVIIIANTIB  No results found for: HEPBSAG  No results found for: HEPBCAB  No results found for: HEPCAB  No results found for: TOXOIGG  No components found for: TOXOIGGINTER  No results found for: WEU2QWH  No results found for: MVY5YRB  No results found for: VARICELLAZOS  No results found for: VARICELLAINT  Strongyloides Ab IgG   Date Value Ref Range Status   01/10/2020 Negative Negative Final     Comment:     No detectable levels of IgG antibodies to Strongyloides.  Repeat testing in 1-2 weeks if clinically indicated.  Test Performed by:  [facility] [id number] [place] [address]  : J Carlos Lennon M.D. Ph.D.; CLIA# [id number]       No results found for: EPSTEINBARRV  No results found for: HEPBSAB  No results found for: QUANTIFERON  No results found for: HEPAIGM  No results  found for: PPD  No results found for this or any previous visit.        BRM - Candidacy    Biologic Response Modifier Candidacy: Based on available information, there are no identified significant barriers to BRMs from an infectious disease standpoint pending acceptable serologies.  Final determination of BRM candidacy will be made once evaluation is complete and reviewed.    Tania Bryant MD    Counseling:I discussed with Nirali the risk for increased susceptibility to infections following BRM therapy including increased risk for infection.  The patients has been counseled on the importance of vaccinations including but not limited to a yearly flu vaccine.Specific guidance has been provided to the patient regarding the patients occupation, hobbies and activities to avoid future infectious complications including but not limited to avoiding undercooked meats and seafood, proper hygiene, and contact with animals.

## 2020-03-02 ENCOUNTER — OFFICE VISIT (OUTPATIENT)
Dept: NEUROLOGY | Facility: CLINIC | Age: 55
End: 2020-03-02
Payer: COMMERCIAL

## 2020-03-02 VITALS
DIASTOLIC BLOOD PRESSURE: 82 MMHG | SYSTOLIC BLOOD PRESSURE: 133 MMHG | HEIGHT: 67 IN | BODY MASS INDEX: 30.33 KG/M2 | HEART RATE: 81 BPM | WEIGHT: 193.25 LBS

## 2020-03-02 DIAGNOSIS — R27.0 ATAXIA: Primary | ICD-10-CM

## 2020-03-02 PROCEDURE — 99999 PR PBB SHADOW E&M-EST. PATIENT-LVL III: CPT | Mod: PBBFAC,,, | Performed by: PSYCHIATRY & NEUROLOGY

## 2020-03-02 PROCEDURE — 99215 PR OFFICE/OUTPT VISIT, EST, LEVL V, 40-54 MIN: ICD-10-PCS | Mod: S$GLB,,, | Performed by: PSYCHIATRY & NEUROLOGY

## 2020-03-02 PROCEDURE — 99999 PR PBB SHADOW E&M-EST. PATIENT-LVL III: ICD-10-PCS | Mod: PBBFAC,,, | Performed by: PSYCHIATRY & NEUROLOGY

## 2020-03-02 PROCEDURE — 99215 OFFICE O/P EST HI 40 MIN: CPT | Mod: S$GLB,,, | Performed by: PSYCHIATRY & NEUROLOGY

## 2020-03-02 PROCEDURE — 3008F PR BODY MASS INDEX (BMI) DOCUMENTED: ICD-10-PCS | Mod: CPTII,S$GLB,, | Performed by: PSYCHIATRY & NEUROLOGY

## 2020-03-02 PROCEDURE — 3008F BODY MASS INDEX DOCD: CPT | Mod: CPTII,S$GLB,, | Performed by: PSYCHIATRY & NEUROLOGY

## 2020-03-02 RX ORDER — PREDNISONE 5 MG/1
5 TABLET ORAL DAILY
Qty: 30 TABLET | Refills: 6 | Status: SHIPPED | OUTPATIENT
Start: 2020-03-02 | End: 2021-05-17 | Stop reason: ALTCHOICE

## 2020-03-02 NOTE — PROGRESS NOTES
"MOVEMENT DISORDERS CLINIC Followup  PCP/Referring Provider: No referring provider defined for this encounter.  Date of Service: 3/2/2020    Chief Complaint: Ataxia    Interval hx  Since last visit has continued IVIG [id number]  Feels like she walks more confidently for 1.5 weeks and declines until her next therapy.  Still working.    Can no longer do stairs.  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    Speech is progressively slurred    Continues to have decreased R arm swing  Continues to have imbalance but she feels more confident. She walk all day long at work. She works as a nurse.  Trouble with stairs, going down, however has R knee pain    Has been going to PT off and on  No numbness or tingling in feet  No trouble swallowing  Some urinary incontinence    Feels like memory is poor at times    MRI brain showed no lesions or cerebellar atrophy - no Hot cross bun sign  PAGE scans showed normal uptake  EMG was normal  CT C/AB/PEL was neg for malignancy    She continues to get B12 injections  MELVIN 65 NL  TPO POS  Notes her son was diagnosed with PANDAS and has funny eye movements - poor fine motor skills as a kid    Does have REM sleep behavior issues for 3 months    "PriorHPI: Nirali Kirkpatrick is a L HANDED 54 y.o. female with a medical issues significant for HTN, Anxiety, Vit B12 deficiency, who presents with ataxia for 1.5 years. She first noted 1.5 years ago her R foot was dragging and every 3-4 steps she would catch that toe. She noticed her R foot would catch on curbs. Going down steps is especially hard - she mis-steps often. She feels like she has progressively been imbalanced. Her first fall was a year ago. She has fallen more often since then. She falls twice a month now. Falls typically sideways. She holds onto her  for stability but does not use an assist device. Tends to have more ataxia after she's tired. She occasionally has a had tremor in her " "hands when starting IVs. She does at times feel clumsy with her hands. No spinning vertigo. No lightheadedness. No visual issues.    No double vision, eye drooping, head drop, dysphagia.  She was told her B12 was low normal Aug 2018, and has had 4 shots since    No dysarthria.    She saw a general neurologist  B6 - NL  B12 - 333    Brain MRI read as normal  Spine and Tspine 2018 - no central disc issues  Has not had an EMG    No fam hx ataxia, MS  Uncle has M. Gravis  Mother and brother have had tremors    PD Review of Symptoms:  Anosmia: None  Dysarthria/Hypophonia: None  Dysphagia/Sialorrhea: None  Hallucinations: None  Depression: None  Cognitive slowing: None  Urinary changes: None  Constipation: at times  Orthostasis: none  Falls: as above  Micrographia: none  Sleep issues:  -SUSAN: at times snores  -RBD: Talks and punches in her sleep  -Sleep Quality: good, wakes every 3 hours"    Review of Systems:   Review of Systems   Constitutional: Negative for fever.   HENT: Negative for congestion.    Eyes: Negative for double vision.   Respiratory: Negative for cough and shortness of breath.    Cardiovascular: Negative for chest pain and leg swelling.   Gastrointestinal: Negative for nausea.   Genitourinary: Negative for dysuria.   Musculoskeletal: Positive for falls.   Skin: Negative for rash.   Neurological: Positive for tremors. Negative for speech change and headaches.   Psychiatric/Behavioral: Negative for depression.         Current Medications:  Outpatient Encounter Medications as of 3/2/2020   Medication Sig Dispense Refill    amLODIPine (NORVASC) 5 MG tablet Take 5 mg by mouth once daily.       amoxicillin/potassium clav (AUGMENTIN ORAL)       b complex vitamins tablet Take 1 tablet by mouth once daily.      baclofen (LIORESAL) 10 MG tablet Take 0.5 tablets (5 mg total) by mouth 3 (three) times daily. 45 tablet 11    cyanocobalamin 1,000 mcg/mL injection 1,000 mcg every 30 days.       desvenlafaxine " "succinate (PRISTIQ) 100 MG Tb24 100 mg once daily.       multivitamin capsule Take 1 capsule by mouth once daily.      predniSONE (DELTASONE) 5 MG tablet Take 1 tablet (5 mg total) by mouth once daily. 30 tablet 6     No facility-administered encounter medications on file as of 3/2/2020.        Past Medical History:  HTN    Past Surgical History:  Gallbladder, gastric sleeve    Current Living Situation: home    Social:  Social History     Socioeconomic History    Marital status:      Spouse name: Not on file    Number of children: Not on file    Years of education: Not on file    Highest education level: Not on file   Occupational History    Not on file   Social Needs    Financial resource strain: Not on file    Food insecurity:     Worry: Not on file     Inability: Not on file    Transportation needs:     Medical: Not on file     Non-medical: Not on file   Tobacco Use    Smoking status: Never Smoker   Substance and Sexual Activity    Alcohol use: Not Currently    Drug use: Never    Sexual activity: Not on file   Lifestyle    Physical activity:     Days per week: Not on file     Minutes per session: Not on file    Stress: Not on file   Relationships    Social connections:     Talks on phone: Not on file     Gets together: Not on file     Attends Buddhist service: Not on file     Active member of club or organization: Not on file     Attends meetings of clubs or organizations: Not on file     Relationship status: Not on file   Other Topics Concern    Not on file   Social History Narrative    Not on file       Family History:  As above    PHYSICAL:  /82 (BP Location: Left arm, Patient Position: Sitting)   Pulse 81   Ht 5' 7" (1.702 m)   Wt 87.6 kg (193 lb 3.7 oz)   BMI 30.26 kg/m²     General Medical Examination:  General: Good hygiene, appropriate appearance.  HEENT: Normocephalic, atraumatic.   Neck: Supple.   Chest: Unlabored breathing.   CV: Symmetric pulses.   Ext: No " clubbing, cyanosis, or edema.     Mental Status:  Mood/Affect: Appropriate/congruent.  Level of consciousness: Awake, alert.  Orientation: Oriented to person, place, time and situation.  Language: No Dysarthria    Cranial nerves:  I: Not tested  II: PERRL, VFF to counting  III, IV, VI: EOMI with conjugate gaze and no nystagmus on end gaze   V: Facial sensation intact and symmetric over the bilateral V1-V3  VII: Facial muscle activation intact and symmetric over the bilateral upper and lower face  VIII: Hearing intact in the b/l ears and symmetrical to finger rub  IX, X, XII: TUP midline - no atrophy or fasiculations  X: SCMs and shoulder shrug full strength b/l and symmetric  Can easily say PA PA TA TA  Trouble with GAs and Naomy  Can say [place] needs a good supply of wood    Motor:   Strength Intact throughout upper and lower extremities, 5/5.  No foot drop    DTRs:  ? Biceps Triceps Brachioradialis Knee Ankle   Left 2+ 2+ 2+ 3+ 2+   Right 2+ 2+ 2+ 3+ 2+   -Plantar reflex: NL    ? Finger taps Finger flicks PEDRO Heel taps   Left nl nl nl nl   Right nl nl nl nl   Neck tone: nl  ? Arm Leg   Left nl nl   Right nl nl     Sensation:   -Light touch: Intact and symmetric in the bilateral upper and lower extremities.  -Temp: Intact and symmetric in the bilateral upper and lower extremities.  -Vibration: mild vibratory loss to mid-shin    Coordination:   -Finger to nose: intention tremor mild bilat  Past-points 1 inch R hand >L hand  Disdiatochokinesias R hand moderate  Disdiatochokinesias L hand mild  -Heel to shin: mild issues R foot  R hand spiral +++  L hand spiral ++    No resting or postural tremor    Gait:  -Arises from chair without use of hands.  -Casual gait is: wide based, somewhat stiff, circumducts, waves moderately L to R when she walks - ataxic   -Stride length: nl  -Arm Swing: decreased R  -Turning: wide  -Tandem gait: cannot  No foot drop    Romberg: strongly POS    Laboratory Data:  Results for  RADHA HAHN (MRN [id number]) as of 6/3/2019 09:52   Ref. Range 5/2/2019 11:23   Folate Latest Ref Range: 4.0 - 24.0 ng/mL 10.7   Vitamin B-12 Latest Ref Range: 180 - 914 ng/L 525   Sodium Latest Ref Range: 136 - 145 mmol/L 141   Potassium Latest Ref Range: 3.5 - 5.1 mmol/L 3.7   Chloride Latest Ref Range: 95 - 110 mmol/L 106   CO2 Latest Ref Range: 23 - 29 mmol/L 26   Anion Gap Latest Ref Range: 8 - 16 mmol/L 9   BUN, Bld Latest Ref Range: 6 - 20 mg/dL 22 (H)   Creatinine Latest Ref Range: 0.5 - 1.4 mg/dL 0.8   eGFR if non African American Latest Ref Range: >60 mL/min/1.73 m^2 >60.0   eGFR if African American Latest Ref Range: >60 mL/min/1.73 m^2 >60.0   Glucose Latest Ref Range: 70 - 110 mg/dL 92   Calcium Latest Ref Range: 8.7 - 10.5 mg/dL 9.8   Alkaline Phosphatase Latest Ref Range: 55 - 135 U/L 86   PROTEIN TOTAL Latest Ref Range: 6.0 - 8.4 g/dL 7.4   Albumin Latest Ref Range: 3.5 - 5.2 g/dL 4.2   BILIRUBIN TOTAL Latest Ref Range: 0.1 - 1.0 mg/dL 0.6   AST Latest Ref Range: 10 - 40 U/L 19   ALT Latest Ref Range: 10 - 44 U/L 16   Ammonia Latest Ref Range: 10 - 50 umol/L 26   Arsenic Latest Ref Range: 0 - 12 ng/mL <1   Thiamine Latest Ref Range: 38 - 122 ug/L 63   Vitamin E Latest Ref Range: 500 - 1800 ug/dL 1302   Glutamic Acid Decarb Ab Latest Ref Range: <=0.02 nmol/L 0.00   TSH Latest Ref Range: 0.400 - 4.000 uIU/mL 0.649   PTH Latest Ref Range: 9.0 - 77.0 pg/mL 103.0 (H)   Race Unknown Test Not Performed   AChR Binding Ab, Serum Latest Ref Range: <=0.02 nmol/L 0.00   AChR Ganglionic Neuronal Ab Latest Ref Range: <=0.02 nmol/L 0.00   CRMP-5 IgG Latest Ref Range: <1:240 titer Negative   Neuronal (V-G) K+ Channel Ab, Serum Latest Ref Range: <=0.02 nmol/L 0.00   NMO Interpretive Comments Unknown SEE BELOW   N-Type Calcium Channel Ab Latest Ref Range: <=0.03 nmol/L 0.00   P/Q Type Calcium Channel Ab Latest Ref Range: <=0.02 nmol/L 0.00   PAVAL AGNA-1, Serum Latest Ref Range: <1:240 titer Negative   PAVAL  TOBY-1, Serum Latest Ref Range: <1:240 titer Negative   PAVAL TOBY-2, Serum Latest Ref Range: <1:240 titer Negative   PAVAL TOBY-3, Serum Latest Ref Range: <1:240 titer Negative   PAVAL reflex test added Unknown None.   PAVAL,  Amphiphysin Ab, Serum Latest Ref Range: <1:240 titer Negative   PAVAL, PCA-1, Serum Latest Ref Range: <1:240 titer Negative   PAVAL, PCA-2, Serum Latest Ref Range: <1:240 titer Negative   PAVAL, PCA-Tr, Serum Latest Ref Range: <1:240 titer Negative   STRIATED MUSCLE AB Latest Ref Range: <1:120 titer Negative   Lead Latest Ref Range: 0.0 - 4.9 mcg/dL <1.0   Cadmium Latest Ref Range: 0.0 - 4.9 ng/mL 0.3   City Unknown Test Not Performed   County Unknown Test Not Performed   Guardian First Name Unknown Test Not Performed   Guardian Last Name Unknown Test Not Performed   Home Phone Unknown Test Not Performed   [place] Latest Ref Range: 0 - 9 ng/mL 2   State Unknown Test Not Performed   Street Address Unknown Test Not Performed   Venous/Capillary Unknown Test Not Performed   Zip Unknown Test Not Performed      Results for RADHA HAHN (MRN [id number]) as of 8/23/2019 10:10   Ref. Range 6/3/2019 10:27   Sed Rate Latest Ref Range: 0 - 36 mm/Hr 13   CRP Latest Ref Range: 0.0 - 8.2 mg/L 0.5   CPK Latest Ref Range: 20 - 180 U/L 55   CERULOPLASMIN Latest Ref Range: 15.0 - 45.0 mg/dL 33.0   Acetylchol Modul Ab Latest Units: % 0   AChR Binding Ab, Serum Latest Ref Range: <=0.02 nmol/L 0.00   MG Interpretive Comments Unknown SEE BELOW   MuSK Antibody Test Latest Ref Range: 0.00 - 0.02 nmol/L 0.00   STRIATED MUSCLE AB Latest Ref Range: <1:120 titer Negative   Aldolase Latest Ref Range: 1.2 - 7.6 U/L 3.5       Imaging:  Brain MRI w/o cerebellar degeneration or lesion - however poor quality MRI  Repeat brain MRI  FINDINGS:  MRI of the brain demonstrates no infarction, mass, hemorrhage, extra-axial collection, or other acute finding.  A skull base lesions are seen.  Following contrast administration, there  is no abnormal enhancing lesion identified.  A small incidental developmental venous anomaly is seen in the right medial posterior thalamus.  Cspine w/o without central impingement    EMG  Summary   Nerve conduction study of bilateral lower extremities revealed normal antidromic sensory peak latencies, action potentials, and conduction velocities.   Motor peak latencies, amplitudes, and conduction velocities were normal. F-waves were normal.   Concentric needle examination of selected muscles in bilateral lower extremities revealed no evidence of acute or chronic denervation.   Impression   This is a normal study.     Assessment//Plan:   Problem List Items Addressed This Visit        Neuro    Ataxia - Primary    Current Assessment & Plan     Moderate progressive ataxia, which fits criteria for autoimmune ataxia in speed of onset, and now response to IVIG.  Brain MRI repeat shows no cerebellar dz. Cspine not impinged. Lab workup including paraneoplastic, David 65 neg. TPO labs POS. CT C/ab/Pel neg for occult malignancy. F/u MDS2 panel.  Will add prednisone 5mg Qdaily as she improved after a medrol pack. Considering starting azathioprine to promote steroid-sparing drug. She completed ID consult prior.    Given new onset REM sleep behavior dz, will monitor for MSA with serial MRI at 1 year. No PDism noted. Some incontinence.  Will consider genetic ataxia screen if all Neg, however no fam hx.           Relevant Orders    Misc Sendout Test, Blood MDS2 autoimmune panel (Completed)          Follow-up: 1 mo  Discussed the importance of ongoing exercise in efforts to improve mobility and balance.    Ondina Polo MD, MS [facility]  Department of Neurology  Movement Disorders

## 2020-03-02 NOTE — ASSESSMENT & PLAN NOTE
Moderate progressive ataxia, which fits criteria for autoimmune ataxia in speed of onset, and now response to IVIG.  Brain MRI repeat shows no cerebellar dz. Cspine not impinged. Lab workup including paraneoplastic, David 65 neg. TPO labs POS. CT C/ab/Pel neg for occult malignancy. F/u MDS2 panel.  Will add prednisone 5mg Qdaily as she improved after a medrol pack. Considering starting azathioprine to promote steroid-sparing drug. She completed ID consult prior.    Given new onset REM sleep behavior dz, will monitor for MSA with serial MRI at 1 year. No PDism noted. Some incontinence.  Will consider genetic ataxia screen if all Neg, however no fam hx.

## 2020-03-14 ENCOUNTER — PATIENT MESSAGE (OUTPATIENT)
Dept: NEUROLOGY | Facility: CLINIC | Age: 55
End: 2020-03-14

## 2020-03-19 ENCOUNTER — PATIENT MESSAGE (OUTPATIENT)
Dept: NEUROLOGY | Facility: CLINIC | Age: 55
End: 2020-03-19

## 2020-04-06 ENCOUNTER — PATIENT MESSAGE (OUTPATIENT)
Dept: NEUROLOGY | Facility: CLINIC | Age: 55
End: 2020-04-06

## 2020-05-28 ENCOUNTER — TELEPHONE (OUTPATIENT)
Dept: NEUROLOGY | Facility: CLINIC | Age: 55
End: 2020-05-28

## 2020-07-13 ENCOUNTER — TELEPHONE (OUTPATIENT)
Dept: NEUROLOGY | Facility: CLINIC | Age: 55
End: 2020-07-13

## 2020-07-13 NOTE — TELEPHONE ENCOUNTER
"Spoke with Mrs. Kirkpatrick, she was informed there has been some changes to Dr. Polo schedule, the appt for 7/24 is scheduled on a virtual day. Mrs. Kirkpatrick voiced understanding declining virtual appt stating "she needs to be seen in person, this is the third time my appt has been changed, do I need to et another neurologist". Mrs. Kirkpatrick was apologized to regarding appt changes informing her Tuesday afternoon and all day Thursday are now in person appts, the first available is 8/25 at 4:20. Mrs. Kirkpatrick accepted the appt asking to be placed on a waiting list, she will look around for another neurologist, if she finds one before upcoming appt she will contact the clinic to cancel.   "

## 2020-08-07 ENCOUNTER — TELEPHONE (OUTPATIENT)
Dept: NEUROLOGY | Facility: CLINIC | Age: 55
End: 2020-08-07

## 2020-08-07 NOTE — TELEPHONE ENCOUNTER
----- Message from Marian Huynh sent at 8/7/2020 10:09 AM CDT -----  Contact: [facility] @ [phone]  [facility] is calling about nursing orders submitted for pts script for gamunex c. They're wanting to know if the dr has a license for [place] as well? Because of the pt residing out of state from where the drs office is, this is coming up as an issue and they're needing verification before they can proceed. They state they also need a script for saline bags and flushes.

## 2020-08-07 NOTE — TELEPHONE ENCOUNTER
----- Message from Maritza Honeycutt sent at 8/7/2020  9:47 AM CDT -----  Contact: Laura ([facility]) [phone]  Laura called to speak to someone regarding a few of the patient's prescriptions. Please contact her to discuss further.

## 2020-08-10 ENCOUNTER — TELEPHONE (OUTPATIENT)
Dept: NEUROLOGY | Facility: CLINIC | Age: 55
End: 2020-08-10

## 2020-08-10 NOTE — TELEPHONE ENCOUNTER
IVIG has been switched to [place] from [facility].  Patient states she receives the IVIG at home.    Clarified orders:  250 mg NS Hydration bolus at infusion.  Peripheral sticks.  Tylenol and Benadryl PRN prior to infusion.  EPIPEN.    Due this Thurs, Friday.     Nursing orders must be from a license provider within the state.      Spoke to Laura. She will fax over the nursing orders to be signed.

## 2020-08-10 NOTE — TELEPHONE ENCOUNTER
----- Message from Liz Kothari sent at 8/10/2020 12:16 PM CDT -----  Regarding: Eusebia from [facility]  Eusebia from [facility] need home health nursing orders. Asking for a call back.    Contact info [phone]

## 2020-08-10 NOTE — TELEPHONE ENCOUNTER
----- Message from Marietta Yao sent at 8/10/2020  9:11 AM CDT -----  Contact: Gil @ [phone] opt 1 then option 5  [facility] calling on behalf of the patient to get confirmation of the infusion site and medications.

## 2020-08-13 ENCOUNTER — TELEPHONE (OUTPATIENT)
Dept: NEUROLOGY | Facility: CLINIC | Age: 55
End: 2020-08-13

## 2020-08-13 ENCOUNTER — PATIENT MESSAGE (OUTPATIENT)
Dept: NEUROLOGY | Facility: CLINIC | Age: 55
End: 2020-08-13

## 2020-08-13 ENCOUNTER — HOSPITAL ENCOUNTER (OUTPATIENT)
Dept: RADIOLOGY | Facility: HOSPITAL | Age: 55
Discharge: HOME | End: 2020-08-13
Attending: PSYCHIATRY & NEUROLOGY
Payer: COMMERCIAL

## 2020-08-13 DIAGNOSIS — R07.9 CHEST PAIN, UNSPECIFIED TYPE: ICD-10-CM

## 2020-08-13 PROCEDURE — 71275 CTA CHEST NON CORONARY: ICD-10-PCS | Mod: 26,,, | Performed by: RADIOLOGY

## 2020-08-13 PROCEDURE — 25500020 PHARM REV CODE 255: Performed by: PSYCHIATRY & NEUROLOGY

## 2020-08-13 PROCEDURE — 71275 CT ANGIOGRAPHY CHEST: CPT | Mod: TC

## 2020-08-13 PROCEDURE — 71275 CT ANGIOGRAPHY CHEST: CPT | Mod: 26,,, | Performed by: RADIOLOGY

## 2020-08-13 RX ADMIN — IOHEXOL 100 ML: 350 INJECTION, SOLUTION INTRAVENOUS at 07:08

## 2020-08-13 NOTE — TELEPHONE ENCOUNTER
"Spoke with Samantha ([facility] infusion nurse) she states "Mrs. Kirkpatrick started experiencing chest tightness,SOB, possible passing out but didn't, unstoppable coughing, Samantha stopped infusions, 25 of Benadryl was administered, 30 minute waiting period, Mrs. Kirkpatrick was still having reaction to the infusions, another 25 of Benadryl was administered, symptoms begin to subside, Mrs. Kirkpatrick has never had an reaction to the infusions before, another dose due this afternoon (8/13/2020)". Samantha wants to know what is the next step before administering next dose.   "

## 2020-08-13 NOTE — TELEPHONE ENCOUNTER
----- Message from Sydnie Duarte sent at 8/13/2020 11:58 AM CDT -----  Contact: Eusebia [facility]    [phone]  Calling with questions concerning pts prescription for Gamunex C.  Pls call.     SHELIA NYE [place] [address] [place] [facility] [phone] (Phone)        [phone] (Fax)

## 2020-08-13 NOTE — TELEPHONE ENCOUNTER
Called Samantha, Who described a reaction during IVIG  She's had several IVIG reactions without issue int he past  Karime experienced chest tightness and itching, after which who had coughing   Had Benadryl 25mg  Reaction subsided after 1 hour  Coughing subsided  Epi Pen not used as Benadryl seemed to solve her reaction  Suggested stop IVIG infusions

## 2020-08-13 NOTE — TELEPHONE ENCOUNTER
Spoke with Mrs. Kirkpatrick, she was informed per Dr. Polo she is scheduled for an CT for 8/13/2020 at 5:45, nothing to eat 4 hours before appt, Mrs. Kirkpatrick was given [phone] if assistance is needed, to go to the ED if there is any concerns or issues before scheduled CT. Mrs. Kirkpatrick verbalized understanding.

## 2020-08-14 ENCOUNTER — TELEPHONE (OUTPATIENT)
Dept: NEUROLOGY | Facility: CLINIC | Age: 55
End: 2020-08-14

## 2020-08-14 ENCOUNTER — PATIENT MESSAGE (OUTPATIENT)
Dept: NEUROLOGY | Facility: CLINIC | Age: 55
End: 2020-08-14

## 2020-08-14 NOTE — TELEPHONE ENCOUNTER
----- Message from Shira Rioz sent at 8/14/2020  1:30 PM CDT -----  Regarding: pt appt  Contact: pt called  Pt called states she needs an appointment next week please advise pt    Pt can be reached [phone]

## 2020-08-24 ENCOUNTER — TELEPHONE (OUTPATIENT)
Dept: NEUROLOGY | Facility: CLINIC | Age: 55
End: 2020-08-24

## 2020-08-24 NOTE — TELEPHONE ENCOUNTER
----- Message from Maritza Honeycutt sent at 8/24/2020 12:49 PM CDT -----  The patient is ok with changing her appointment to virtual. She's currently at work so no need to call back. The system wouldn't allow me to make the changes

## 2020-08-25 ENCOUNTER — OFFICE VISIT (OUTPATIENT)
Dept: NEUROLOGY | Facility: CLINIC | Age: 55
End: 2020-08-25
Payer: COMMERCIAL

## 2020-08-25 DIAGNOSIS — R53.1 WEAKNESS: ICD-10-CM

## 2020-08-25 DIAGNOSIS — R27.0 ATAXIA: Primary | ICD-10-CM

## 2020-08-25 PROCEDURE — 99215 PR OFFICE/OUTPT VISIT, EST, LEVL V, 40-54 MIN: ICD-10-PCS | Mod: 95,,, | Performed by: PSYCHIATRY & NEUROLOGY

## 2020-08-25 PROCEDURE — 99215 OFFICE O/P EST HI 40 MIN: CPT | Mod: 95,,, | Performed by: PSYCHIATRY & NEUROLOGY

## 2020-08-25 NOTE — PROGRESS NOTES
"  [facility] DISORD[facility] Followup  PCP/Referring Provider: No referring provider defined for this encounter.  Date of Service: 8/25/2020    Chief Complaint: Ataxia    Interval hx  Since last visit has continued IVIG [id number]  Feels like she walks more confidently for 3 days and declines until her next therapy.  She had an acute SOB afor 15 minutes without hives or rash or swelling  CT PE NEG  Per IVIG company, possible TRALI reaction due to high rate of infusion    Overall steady decline since she last saw me  Struggling to write  Can no longer do stairs.  Still having trouble writing checks   strength decreased     Speech is progressively slurred    Continues to have decreased R arm swing  Continues to have imbalance. Has been falling more - 4-5 a month.  Furniture surfing at home  Quit PT after covid due to cost    Occasional numbness or tingling in feet  No trouble swallowing  Some urinary incontinence has gotten worse - has many accidents a day  One fecal incontinence    Feels like memory is poor at times - trouble recalling names    MRI brain showed no lesions or cerebellar atrophy - no Hot cross bun sign 2019  PAGE scans showed normal uptake  EMG was normal 2019  CT C/AB/PEL was neg for malignancy    She continues to get B12 injections  MELVIN 65 NL  TPO POS  Notes her son was diagnosed with PANDAS and "has funny eye movements" - poor fine motor skills as a kid    Does have REM sleep behavior issues for 3 months    Continues B12 shots    Was going to try steroids and 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 female with a medical issues significant for HTN, Anxiety, Vit B12 deficiency, who presents with ataxia for 1.5 years. She first noted 1.5 years ago her R foot was dragging and every 3-4 steps she would catch that toe. She noticed her R foot would catch on curbs. Going down steps is especially hard - she mis-steps often. She feels like she " "has progressively been imbalanced. Her first fall was a year ago. She has fallen more often since then. She falls twice a month now. Falls typically sideways. She holds onto her  for stability but does not use an assist device. Tends to have more ataxia after she's tired. She occasionally has a had tremor in her hands when starting IVs. She does at times feel clumsy with her hands. No spinning vertigo. No lightheadedness. No visual issues.    No double vision, eye drooping, head drop, dysphagia.  She was told her B12 was low normal Aug 2018, and has had 4 shots since    No dysarthria.    She saw a general neurologist  B6 - NL  B12 - 333    Brain MRI read as normal  Spine and Tspine 2018 - no central disc issues  Has not had an EMG    No fam hx ataxia, MS  Uncle has M. Gravis  Mother and brother have had tremors    PD Review of Symptoms:  Anosmia: None  Dysarthria/Hypophonia: None  Dysphagia/Sialorrhea: None  Hallucinations: None  Depression: None  Cognitive slowing: None  Urinary changes: None  Constipation: at times  Orthostasis: none  Falls: as above  Micrographia: none  Sleep issues:  -SUSAN: at times snores  -RBD: Talks and punches in her sleep  -Sleep Quality: good, wakes every 3 hours"    Review of Systems:   Review of Systems   Constitutional: Negative for fever.   HENT: Negative for congestion.    Eyes: Negative for double vision.   Respiratory: Negative for cough and shortness of breath.    Cardiovascular: Negative for chest pain and leg swelling.   Gastrointestinal: Negative for nausea.   Genitourinary: Negative for dysuria.   Musculoskeletal: Positive for falls.   Skin: Negative for rash.   Neurological: Positive for tremors. Negative for speech change and headaches.   Psychiatric/Behavioral: Negative for depression.         Current Medications:  Outpatient Encounter Medications as of 8/25/2020   Medication Sig Dispense Refill    amLODIPine (NORVASC) 5 MG tablet Take 5 mg by mouth once daily.       " amoxicillin/potassium clav (AUGMENTIN ORAL)       b complex vitamins tablet Take 1 tablet by mouth once daily.      baclofen (LIORESAL) 10 MG tablet Take 0.5 tablets (5 mg total) by mouth 3 (three) times daily. 45 tablet 11    cyanocobalamin 1,000 mcg/mL injection 1,000 mcg every 30 days.       desvenlafaxine succinate (PRISTIQ) 100 MG Tb24 100 mg once daily.       multivitamin capsule Take 1 capsule by mouth once daily.      predniSONE (DELTASONE) 5 MG tablet Take 1 tablet (5 mg total) by mouth once daily. 30 tablet 6     No facility-administered encounter medications on file as of 8/25/2020.        Past Medical History:  HTN    Past Surgical History:  Gallbladder, gastric sleeve    Current Living Situation: home    Social:  Social History     Socioeconomic History    Marital status:      Spouse name: Not on file    Number of children: Not on file    Years of education: Not on file    Highest education level: Not on file   Occupational History    Not on file   Social Needs    Financial resource strain: Not on file    Food insecurity     Worry: Not on file     Inability: Not on file    Transportation needs     Medical: Not on file     Non-medical: Not on file   Tobacco Use    Smoking status: Never Smoker   Substance and Sexual Activity    Alcohol use: Not Currently    Drug use: Never    Sexual activity: Not on file   Lifestyle    Physical activity     Days per week: Not on file     Minutes per session: Not on file    Stress: Not on file   Relationships    Social connections     Talks on phone: Not on file     Gets together: Not on file     Attends Congregation service: Not on file     Active member of club or organization: Not on file     Attends meetings of clubs or organizations: Not on file     Relationship status: Not on file   Other Topics Concern    Not on file   Social History Narrative    Not on file       Family History:  As above    PHYSICAL:  There were no vitals taken for this  visit.    General Medical Examination:  General: Good hygiene, appropriate appearance.  HEENT: Normocephalic, atraumatic.   Neck: Supple.   Chest: Unlabored breathing.   CV: Symmetric pulses.   Ext: No clubbing, cyanosis, or edema.     Mental Status:  Mood/Affect: Appropriate/congruent.  Level of consciousness: Awake, alert.  Orientation: Oriented to person, place, time and situation.  Language: No Dysarthria    Cranial nerves:  I: Not tested  II: PERRL, VFF to counting  III, IV, VI: EOMI with conjugate gaze and no nystagmus on end gaze   V: Facial sensation intact and symmetric over the bilateral V1-V3  VII: Facial muscle activation intact and symmetric over the bilateral upper and lower face  VIII: Hearing intact in the b/l ears and symmetrical to finger rub  IX, X, XII: TUP midline - no atrophy or fasiculations  X: SCMs and shoulder shrug full strength b/l and symmetric  Can easily say PA PA TA TA  Trouble with GAs and Naomy  Can say [place] needs a good supply of wood    Motor:   Cannot squeeze and hold 's hands   When lifting her knees she cannot overcome her  pushing it down      ? Finger taps Finger flicks PEDRO Heel taps   Left nl nl nl nl   Right nl nl Incoordinated nl   Neck tone: nl  ? Arm Leg   Left nl nl   Right nl nl         Coordination:   -Finger to nose: intention tremor mild bilat  Past-points 1 inch R hand >L hand  Disdiatochokinesias R hand moderate  Disdiatochokinesias L hand mild  -Heel to shin: mild issues R foot  R hand spiral +++  L hand spiral ++    No resting or postural tremor    Gait:  -Arises from chair without use of hands.  -Casual gait is: wide based, somewhat stiff, circumducts, waves moderately L to R when she walks - ataxic   -Stride length: nl  -Arm Swing: decreased R  -Turning: wide  -Tandem gait: cannot  No foot drop      Laboratory Data:  Results for RADHA HAHN (MRN [id number]) as of 6/3/2019 09:52   Ref. Range 5/2/2019 11:23   Folate Latest Ref Range:  4.0 - 24.0 ng/mL 10.7   Vitamin B-12 Latest Ref Range: 180 - 914 ng/L 525   Sodium Latest Ref Range: 136 - 145 mmol/L 141   Potassium Latest Ref Range: 3.5 - 5.1 mmol/L 3.7   Chloride Latest Ref Range: 95 - 110 mmol/L 106   CO2 Latest Ref Range: 23 - 29 mmol/L 26   Anion Gap Latest Ref Range: 8 - 16 mmol/L 9   BUN, Bld Latest Ref Range: 6 - 20 mg/dL 22 (H)   Creatinine Latest Ref Range: 0.5 - 1.4 mg/dL 0.8   eGFR if non African American Latest Ref Range: >60 mL/min/1.73 m^2 >60.0   eGFR if African American Latest Ref Range: >60 mL/min/1.73 m^2 >60.0   Glucose Latest Ref Range: 70 - 110 mg/dL 92   Calcium Latest Ref Range: 8.7 - 10.5 mg/dL 9.8   Alkaline Phosphatase Latest Ref Range: 55 - 135 U/L 86   PROTEIN TOTAL Latest Ref Range: 6.0 - 8.4 g/dL 7.4   Albumin Latest Ref Range: 3.5 - 5.2 g/dL 4.2   BILIRUBIN TOTAL Latest Ref Range: 0.1 - 1.0 mg/dL 0.6   AST Latest Ref Range: 10 - 40 U/L 19   ALT Latest Ref Range: 10 - 44 U/L 16   Ammonia Latest Ref Range: 10 - 50 umol/L 26   Arsenic Latest Ref Range: 0 - 12 ng/mL <1   Thiamine Latest Ref Range: 38 - 122 ug/L 63   Vitamin E Latest Ref Range: 500 - 1800 ug/dL 1302   Glutamic Acid Decarb Ab Latest Ref Range: <=0.02 nmol/L 0.00   TSH Latest Ref Range: 0.400 - 4.000 uIU/mL 0.649   PTH Latest Ref Range: 9.0 - 77.0 pg/mL 103.0 (H)   Race Unknown Test Not Performed   AChR Binding Ab, Serum Latest Ref Range: <=0.02 nmol/L 0.00   AChR Ganglionic Neuronal Ab Latest Ref Range: <=0.02 nmol/L 0.00   CRMP-5 IgG Latest Ref Range: <1:240 titer Negative   Neuronal (V-G) K+ Channel Ab, Serum Latest Ref Range: <=0.02 nmol/L 0.00   NMO Interpretive Comments Unknown SEE BELOW   N-Type Calcium Channel Ab Latest Ref Range: <=0.03 nmol/L 0.00   P/Q Type Calcium Channel Ab Latest Ref Range: <=0.02 nmol/L 0.00   PAVAL AGNA-1, Serum Latest Ref Range: <1:240 titer Negative   PAVAL TOBY-1, Serum Latest Ref Range: <1:240 titer Negative   PAVAL TOBY-2, Serum Latest Ref Range: <1:240 titer Negative    PAVAL TOBY-3, Serum Latest Ref Range: <1:240 titer Negative   PAVAL reflex test added Unknown None.   PAVAL,  Amphiphysin Ab, Serum Latest Ref Range: <1:240 titer Negative   PAVAL, PCA-1, Serum Latest Ref Range: <1:240 titer Negative   PAVAL, PCA-2, Serum Latest Ref Range: <1:240 titer Negative   PAVAL, PCA-Tr, Serum Latest Ref Range: <1:240 titer Negative   STRIATED MUSCLE AB Latest Ref Range: <1:120 titer Negative   Lead Latest Ref Range: 0.0 - 4.9 mcg/dL <1.0   Cadmium Latest Ref Range: 0.0 - 4.9 ng/mL 0.3   City Unknown Test Not Performed   County Unknown Test Not Performed   Guardian First Name Unknown Test Not Performed   Guardian Last Name Unknown Test Not Performed   Home Phone Unknown Test Not Performed   [place] Latest Ref Range: 0 - 9 ng/mL 2   State Unknown Test Not Performed   Street Address Unknown Test Not Performed   Venous/Capillary Unknown Test Not Performed   Zip Unknown Test Not Performed      Results for RADHA HAHN (MRN [id number]) as of 8/23/2019 10:10   Ref. Range 6/3/2019 10:27   Sed Rate Latest Ref Range: 0 - 36 mm/Hr 13   CRP Latest Ref Range: 0.0 - 8.2 mg/L 0.5   CPK Latest Ref Range: 20 - 180 U/L 55   CERULOPLASMIN Latest Ref Range: 15.0 - 45.0 mg/dL 33.0   Acetylchol Modul Ab Latest Units: % 0   AChR Binding Ab, Serum Latest Ref Range: <=0.02 nmol/L 0.00   MG Interpretive Comments Unknown SEE BELOW   MuSK Antibody Test Latest Ref Range: 0.00 - 0.02 nmol/L 0.00   STRIATED MUSCLE AB Latest Ref Range: <1:120 titer Negative   Aldolase Latest Ref Range: 1.2 - 7.6 U/L 3.5       Imaging:  Brain MRI w/o cerebellar degeneration or lesion - however poor quality MRI  Repeat brain MRI  FINDINGS:  MRI of the brain demonstrates no infarction, mass, hemorrhage, extra-axial collection, or other acute finding.  A skull base lesions are seen.  Following contrast administration, there is no abnormal enhancing lesion identified.  A small incidental developmental venous anomaly is seen in the right  medial posterior thalamus.  Cspine w/o without central impingement    EMG  Summary   Nerve conduction study of bilateral lower extremities revealed normal antidromic sensory peak latencies, action potentials, and conduction velocities.   Motor peak latencies, amplitudes, and conduction velocities were normal. F-waves were normal.   Concentric needle examination of selected muscles in bilateral lower extremities revealed no evidence of acute or chronic denervation.   Impression   This is a normal study.     Assessment//Plan:   Problem List Items Addressed This Visit        Neuro    Ataxia - Primary    Current Assessment & Plan     Moderate progressive ataxia, which fits criteria for autoimmune ataxia in speed of onset, however decline despite IVIG. Due to IVIG reaction and decline, will stop IVIG and reassess. If major decline, she is interested in restarting IVIG with a lower infusion rate.    Also due to interval urinary incontinence, memory issues, motor weakness, will revisit diagnosis. MRI brain to screen for cerebellar atrophy (SCA) or signs of MSA.  EMG to screen for causes of motor weakness.    Prior workup-  Lab workup including paraneoplastic/MDS2 panel, David 65 neg. TPO labs POS. CT C/ab/Pel neg for occult malignancy.     Suggested PT but does home PT due to cost         Relevant Orders    EMG W/ ULTRASOUND AND NERVE CONDUCTION TEST 2 Extremities    MRI Brain W WO Contrast       Other    Weakness    Current Assessment & Plan     Weakness on exam today evident on video exam as she cannot maintain a  on her 's finger or overcome him pushing down on her knees. Suggested EMG         Relevant Orders    EMG W/ ULTRASOUND AND NERVE CONDUCTION TEST 2 Extremities    MRI Brain W WO Contrast          Follow-up: 1 mo  Discussed the importance of ongoing exercise in efforts to improve mobility and balance.    Ondina Polo MD, MS [facility]  Department of Neurology  Movement Disorders

## 2020-08-25 NOTE — ASSESSMENT & PLAN NOTE
Moderate progressive ataxia, which fits criteria for autoimmune ataxia in speed of onset, however decline despite IVIG. Due to IVIG reaction and decline, will stop IVIG and reassess. If major decline, she is interested in restarting IVIG with a lower infusion rate.    Also due to interval urinary incontinence, memory issues, motor weakness, will revisit diagnosis. MRI brain to screen for cerebellar atrophy (SCA) or signs of MSA.  EMG to screen for causes of motor weakness.    Prior workup-  Lab workup including paraneoplastic/MDS2 panel, David 65 neg. TPO labs POS. CT C/ab/Pel neg for occult malignancy.     Suggested PT but does home PT due to cost

## 2020-08-25 NOTE — ASSESSMENT & PLAN NOTE
Weakness on exam today evident on video exam as she cannot maintain a  on her 's finger or overcome him pushing down on her knees. Suggested EMG

## 2020-09-22 ENCOUNTER — PATIENT MESSAGE (OUTPATIENT)
Dept: NEUROLOGY | Facility: CLINIC | Age: 55
End: 2020-09-22

## 2020-09-22 DIAGNOSIS — R47.1 DYSARTHRIA: Primary | ICD-10-CM

## 2020-09-22 DIAGNOSIS — R27.0 ATAXIA: ICD-10-CM

## 2020-09-23 ENCOUNTER — PATIENT MESSAGE (OUTPATIENT)
Dept: NEUROLOGY | Facility: CLINIC | Age: 55
End: 2020-09-23

## 2020-09-28 ENCOUNTER — PATIENT MESSAGE (OUTPATIENT)
Dept: NEUROLOGY | Facility: CLINIC | Age: 55
End: 2020-09-28

## 2020-09-29 ENCOUNTER — HOSPITAL ENCOUNTER (OUTPATIENT)
Dept: RADIOLOGY | Facility: HOSPITAL | Age: 55
Discharge: HOME | End: 2020-09-29
Attending: PSYCHIATRY & NEUROLOGY
Payer: COMMERCIAL

## 2020-09-29 ENCOUNTER — CLINICAL SUPPORT (OUTPATIENT)
Dept: REHABILITATION | Facility: HOSPITAL | Age: 55
End: 2020-09-29
Attending: PSYCHIATRY & NEUROLOGY
Payer: COMMERCIAL

## 2020-09-29 DIAGNOSIS — R27.0 ATAXIA: ICD-10-CM

## 2020-09-29 DIAGNOSIS — R26.81 GAIT INSTABILITY: ICD-10-CM

## 2020-09-29 DIAGNOSIS — R53.1 WEAKNESS: ICD-10-CM

## 2020-09-29 PROCEDURE — 25500020 PHARM REV CODE 255: Performed by: PSYCHIATRY & NEUROLOGY

## 2020-09-29 PROCEDURE — A9585 GADOBUTROL INJECTION: HCPCS | Performed by: PSYCHIATRY & NEUROLOGY

## 2020-09-29 PROCEDURE — 70553 MRI BRAIN W WO CONTRAST: ICD-10-PCS | Mod: 26,,, | Performed by: RADIOLOGY

## 2020-09-29 PROCEDURE — 97161 PT EVAL LOW COMPLEX 20 MIN: CPT | Mod: PN

## 2020-09-29 PROCEDURE — 70553 MRI BRAIN STEM W/O & W/DYE: CPT | Mod: TC

## 2020-09-29 PROCEDURE — 70553 MRI BRAIN STEM W/O & W/DYE: CPT | Mod: 26,,, | Performed by: RADIOLOGY

## 2020-09-29 RX ORDER — GADOBUTROL 604.72 MG/ML
10 INJECTION INTRAVENOUS
Status: COMPLETED | OUTPATIENT
Start: 2020-09-29 | End: 2020-09-29

## 2020-09-29 RX ADMIN — GADOBUTROL 10 ML: 604.72 INJECTION INTRAVENOUS at 12:09

## 2020-09-29 NOTE — PLAN OF CARE
[facility]  Physical Therapy Initial Evaluation    Name: Nirali Kirkpatrick  Clinic Number: [id number]    Therapy Diagnosis:   Encounter Diagnoses   Name Primary?         Gait instability      Physician: Ondina Polo MD    Physician Orders: PT Eval and Treat   Medical Diagnosis from Referral: R27.0 (ICD-10-CM) - Ataxia  Evaluation Date: 9/29/2020  Authorization Period Expiration: 12/31/2020  Plan of Care Expiration: 11/16/2020  Visit # / Visits authorized: 1/ 25     Time In: 0900  Time Out: 0930   Total Billable Time: 30 minutes    Precautions: Falls, HTN    Subjective   Date of onset: 2.5 years  History of current condition - Nirali reports: onset of ataxia about 2.5 years ago. Reports she started noticing her R foot would drag and over time began falling. Has been seeing a neurologist for about a year. She has falls once or twice a month. She will sometimes hold onto  for support when walking or furniture surf at home. Reports attempting to use a walking stick for balance but would trip over it. She is unable to negotiate stairs. She also reports UE weakness.    Medical History:   Past Medical History:   Diagnosis Date    Depression     Hypertension        Surgical History:   Nirali Kirkpatrick  has a past surgical history that includes Cholecystectomy; Gastric sleeve (2013); and tubligation (2004).    Past Surgical History:   Procedure Laterality Date    CHOLECYSTECTOMY      Gastric sleeve  2013    tubligation  2004       Medications:   Nirali has a current medication list which includes the following prescription(s): amlodipine, amoxicillin/potassium clav, b complex vitamins, baclofen, cyanocobalamin, desvenlafaxine succinate, multivitamin, and prednisone.    Allergies:   Review of patient's allergies indicates:  No Known Allergies     Imaging: MRI brain 09/29/2020:  Impression:     Mild age advanced volume loss of the uzma with subtle T2 FLAIR signal abnormality in  light of given history concerning for underlying multi-system atrophy.     Clinical correlation and follow-up advised.    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 Reports she has not been able to drive lately   Prior Level of Function: Independent; physically active--would walk 4 miles a day  Current Level of Function: Decreased functional mobility          Pain:    Location: anterior knees   Description: aching, cracking  Aggravating Factors: standing/walking, bending, squatting  Easing Factors: motrin  Current 2/10, worst 10/10, best 0/10     Pts goals: Improve gait/function    Objective     Posture/Appearance: Fwd head position, rounded shoulders, widened base of support    ROM/Strength:     Lower Extremity Range of Motion:  Right Lower Extremity: WFL  Left Lower Extremity: WFL    Lower Extremity Strength:  Right Lower Extremity: 4-/5 to 4/5  Left Lower Extremity: 4-/5 to 4/5      Flexibility: ROM as per above, tightness bilateral gastrocs  Transfers: SBA; sit to stand with bilateral UE support and propping of LE's against seat for stability; unable to rise without UE support  Gait: no AD; ataxic with wide base of support, decreased floor clearance R>L  Special tests:  Coordination impaired R>L LE  Romberg (-)  Unable to attain or sustain standing with feet together without assistance  Difficulty with 360* turns, unstable, several LOB with min A to correct    Functional Outcome Measure: Lower Extremity Functional Scale (LEFS): 31/80=61% impairment      TREATMENT       Home Exercises and Patient Education Provided    Education provided:   - Role/goal PT; POC      Assessment   Nirali is a 54 y.o. female referred to outpatient Physical Therapy with a medical diagnosis of R27.0 (ICD-10-CM) - Ataxia. Pt presents with weakness, decreased balance, gait ataxia, and impaired functional mobility.     Pt prognosis is Good.   Pt will  benefit from skilled outpatient Physical Therapy to address the deficits stated above and in the chart below, provide pt/family education, and to maximize pt's level of independence.     Plan of care discussed with patient: Yes  Pt's spiritual, cultural and educational needs considered and patient is agreeable to the plan of care and goals as stated below:     Anticipated Barriers for therapy: transportation (lives an hour away and unable to drive), possible limitation due to cost    Medical Necessity is demonstrated by the following  History  Co-morbidities and personal factors that may impact the plan of care Co-morbidities:   HTN  Personal Factors:   no deficits     low   Examination  Body Structures and Functions, activity limitations and participation restrictions that may impact the plan of care Body Regions:   lower extremities  Body Systems:    strength  balance  gait  Participation Restrictions:   Activity limitations:   Learning and applying knowledge  no deficits  General Tasks and Commands  no deficits  Communication  no deficits  Mobility  lifting and carrying objects  walking  driving (bike, car, motorcycle)  Self care  washing oneself (bathing, drying, washing hands)  dressing  Domestic Life  doing house work (cleaning house, washing dishes, laundry)  Interactions/Relationships  no deficits  Life Areas  employment  Community and Social Life  recreation and leisure       moderate   Clinical Presentation stable and uncomplicated low   Decision Making/ Complexity Score: low     Goals:  Short Term Goals: 3 weeks   1) Patient will initiate HEP  2) Patient will improve strength by 1/2 muscle grade    Long Term Goals: 6 weeks   1) Patient will be independent in HEP  2) Patient will be able to perform sit to stand without UE support at SBA  3) Patient will improve functional outcome measure LEFS to <40% impairment    Plan   Plan of care Certification: 9/29/2020 to 11/16/2020.    Outpatient Physical Therapy 2  times weekly for 6 weeks to include the following interventions: Gait Training, Manual Therapy, Moist Heat/ Ice, Neuromuscular Re-ed, Patient Education, Therapeutic Activites and Therapeutic Exercise.     Ban Lora, PT

## 2020-09-30 PROBLEM — R26.81 GAIT INSTABILITY: Status: ACTIVE | Noted: 2020-09-30

## 2020-10-01 ENCOUNTER — TELEPHONE (OUTPATIENT)
Dept: NEUROLOGY | Facility: CLINIC | Age: 55
End: 2020-10-01

## 2020-10-01 NOTE — TELEPHONE ENCOUNTER
----- Message from Ondina Polo MD sent at 9/30/2020  4:48 PM CDT -----  Please hold oct 30th for the DBS pt  Nov 5th is OK  ----- Message -----  From: Audrey Samayoa MA  Sent: 9/30/2020   4:26 PM CDT  To: Ondina Polo MD    The next available is October 30th or November 5th. Okay to wait?    ----- Message -----  From: Ondina Polo MD  Sent: 9/30/2020   3:21 PM CDT  To: Audrey Samayoa MA    Next available inperson only encounter to review results

## 2020-10-02 ENCOUNTER — PROCEDURE VISIT (OUTPATIENT)
Dept: NEUROLOGY | Facility: CLINIC | Age: 55
End: 2020-10-02
Payer: COMMERCIAL

## 2020-10-02 ENCOUNTER — CLINICAL SUPPORT (OUTPATIENT)
Dept: REHABILITATION | Facility: HOSPITAL | Age: 55
End: 2020-10-02
Attending: PSYCHIATRY & NEUROLOGY
Payer: COMMERCIAL

## 2020-10-02 ENCOUNTER — PATIENT MESSAGE (OUTPATIENT)
Dept: NEUROLOGY | Facility: CLINIC | Age: 55
End: 2020-10-02

## 2020-10-02 DIAGNOSIS — R53.1 WEAKNESS: ICD-10-CM

## 2020-10-02 DIAGNOSIS — R26.81 GAIT INSTABILITY: ICD-10-CM

## 2020-10-02 DIAGNOSIS — R27.0 ATAXIA: ICD-10-CM

## 2020-10-02 PROCEDURE — 95886 MUSC TEST DONE W/N TEST COMP: CPT | Mod: S$GLB,,, | Performed by: PSYCHIATRY & NEUROLOGY

## 2020-10-02 PROCEDURE — 97110 THERAPEUTIC EXERCISES: CPT | Mod: PN

## 2020-10-02 PROCEDURE — 95912 PR NERVE CONDUCTION STUDY; 11 -12 STUDIES: ICD-10-PCS | Mod: S$GLB,,, | Performed by: PSYCHIATRY & NEUROLOGY

## 2020-10-02 PROCEDURE — 95912 NRV CNDJ TEST 11-12 STUDIES: CPT | Mod: S$GLB,,, | Performed by: PSYCHIATRY & NEUROLOGY

## 2020-10-02 PROCEDURE — 95886 PR EMG COMPLETE, W/ NERVE CONDUCTION STUDIES, 5+ MUSCLES: ICD-10-PCS | Mod: S$GLB,,, | Performed by: PSYCHIATRY & NEUROLOGY

## 2020-10-02 NOTE — PROGRESS NOTES
Physical Therapy Daily Treatment Note     Time In: 0752  Time Out: 0830  Total Billable Time: 38 minutes    Name: Nirali Kirkpatrick  Clinic Number: [id number]    Therapy Diagnosis:   Encounter Diagnosis   Name Primary?    Gait instability      Physician: Ondina Polo MD    Visit Date: 10/2/2020    Physician Orders: PT Eval and Treat   Medical Diagnosis from Referral: R27.0 (ICD-10-CM) - Ataxia  Evaluation Date: 9/29/2020  Authorization Period Expiration: 12/31/2020  Plan of Care Expiration: 11/16/2020  Visit # / Visits authorized: 1/ 25        Precautions: Falls, HTN    Subjective     Pt reports: feeling well today.  Response to previous treatment: N/A  Functional change: no    Pain: 2/10  Location: L foot    Objective     Nirali received therapeutic exercises to develop strength, endurance, ROM and flexibility for 38 minutes including:    Scifit x 10 min LE's only    Seated therex: x 20   LAQ 2# L/R   Hip marches 2# L/R    Hip adduction ball squeezes    Hip abduction clamshells blue TB    // bars:   Standing gastroc stretch using 1/2 foam roll 3/30 sec B   HR/TR x 20   Minisquats x 20   SLS 3/30 sec L/R with light bilateral UE support   Tandem gait fwd 10 ft x 4   Side stepping 10 ft x 4    Education provided:     - Discussed monitoring symptoms and stopping activities which cause increased pain  - Confirmed date/time of next appointment    Written Home Exercises Provided: Will issue next visit    Assessment     Good tolerance of exercises, no major fatigue.  Ambulating without assistive device at close SBA to CGA with ataxic pattern.  Significant difficulty with coordination/balance performing tandem gait in // bars.  Nirali is progressing well towards her goals.   Pt prognosis is Good.     Pt will continue to benefit from skilled outpatient physical therapy to address the deficits listed in the problem list box on initial evaluation, provide pt/family education and to maximize pt's level of independence  in the home and community environment.     Pt's spiritual, cultural and educational needs considered and pt agreeable to plan of care and goals.     Anticipated barriers to physical therapy: none    Goals:  Short Term Goals: 3 weeks   1) Patient will initiate HEP  2) Patient will improve strength by 1/2 muscle grade     Long Term Goals: 6 weeks   1) Patient will be independent in HEP  2) Patient will be able to perform sit to stand without UE support at SBA  3) Patient will improve functional outcome measure LEFS to <40% impairment        Short Term Goal Status:  1) Not met  2) Partially met/ongoing     Long Term Goal Status:  1) Not met  2) Not met  3) Not assessed this date       Plan     Continue with established Plan of Care towards PT goals. Progression of strengthening/balance program as per pt tolerance.

## 2020-10-02 NOTE — PROCEDURES
Procedures       Please see NCS/EMG procedure report    Jerome Samayoa MD  [facility]

## 2020-10-05 ENCOUNTER — CLINICAL SUPPORT (OUTPATIENT)
Dept: REHABILITATION | Facility: HOSPITAL | Age: 55
End: 2020-10-05
Attending: PSYCHIATRY & NEUROLOGY
Payer: COMMERCIAL

## 2020-10-05 DIAGNOSIS — R26.81 GAIT INSTABILITY: ICD-10-CM

## 2020-10-05 PROCEDURE — 97110 THERAPEUTIC EXERCISES: CPT | Mod: PN

## 2020-10-05 NOTE — PROGRESS NOTES
Physical Therapy Daily Treatment Note     Time In: 1420  Time Out: 1303  Total Billable Time: 43 minutes    Name: Nirali Kirkpatrick  Clinic Number: [id number]    Therapy Diagnosis:   Encounter Diagnosis   Name Primary?    Gait instability      Physician: Ondina Polo MD    Visit Date: 10/5/2020    Physician Orders: PT Eval and Treat   Medical Diagnosis from Referral: R27.0 (ICD-10-CM) - Ataxia  Evaluation Date: 9/29/2020  Authorization Period Expiration: 12/31/2020  Plan of Care Expiration: 11/16/2020  Visit # / Visits authorized: 1/ 25     Precautions: Falls, HTN    Subjective     Pt reports: no soreness or fatigue following last visit.  Response to previous treatment: no complaints  Functional change: no    Pain: 2/10  Location: L foot/ankle    Objective     Nirali received therapeutic exercises to develop strength, endurance, ROM and flexibility for 43 minutes including:    Scifit stepper L3 x 10 min LE's only    Seated therex: x 20   LAQ 4# L/R   Hip marches 4# L/R    // bars:   Standing gastroc stretch using 1/2 foam roll 3/30 sec B   Double heel/toe raise x 30   SLS level tile 3/30 sec L/R with light bilateral UE support   Standing hip abduction x 30 L/R   Minisquats x 30 with cues for proper execution   Standing hamstring curls x 30 L/R   Standing marches   Tandem gait fwd/bwd 10 ft x 4   Side stepping 10 ft x 4 L/R       Education provided:     - Initiate home exercise program; written/pictorial copy issue to patient along with additional instructions to perform standing    exercises while holding onto kitchen counter for support and supervised by  for safety purposes  - Discussed monitoring symptoms and stopping activities which cause increased pain      Written Home Exercises Provided: yes.  Exercises were reviewed and Nirali was able to demonstrate them prior to the end of the session.  Nirali demonstrated good  understanding of the education provided.     See EMR under Media for exercises  provided 10/5/2020.    Assessment     Ambulating with no AD at CGA with wide base of support and ataxic pattern. Reporting mild fatigue following treatment.   Nirali is progressing well towards her goals.   Pt prognosis is Good.     Pt will continue to benefit from skilled outpatient physical therapy to address the deficits listed in the problem list box on initial evaluation, provide pt/family education and to maximize pt's level of independence in the home and community environment.     Pt's spiritual, cultural and educational needs considered and pt agreeable to plan of care and goals.     Anticipated barriers to physical therapy:     Goals:  Short Term Goals: 3 weeks   1) Patient will initiate HEP  2) Patient will improve strength by 1/2 muscle grade     Long Term Goals: 6 weeks   1) Patient will be independent in HEP  2) Patient will be able to perform sit to stand without UE support at SBA  3) Patient will improve functional outcome measure LEFS to <40% impairment       Short Term Goal Status:  1) Met  2) Partially met/ongoing    Long Term Goal Status:  1) Met  2) Not met  3) Not assessed this date        Plan     Continue with established Plan of Care towards PT goals. Progress strengthening/balance program per pt tolerance.

## 2020-10-06 RX ORDER — METHYLPREDNISOLONE 4 MG/1
TABLET ORAL
Qty: 1 PACKAGE | Refills: 0 | Status: SHIPPED | OUTPATIENT
Start: 2020-10-06 | End: 2020-10-27

## 2020-10-08 ENCOUNTER — TELEPHONE (OUTPATIENT)
Dept: NEUROLOGY | Facility: CLINIC | Age: 55
End: 2020-10-08

## 2020-10-08 NOTE — TELEPHONE ENCOUNTER
Spoke with Mrs. Kirkpatrick, she was informed there is an 8 am availability on Friday 10/9/2020. Mrs. Kirkpatrick accepted the appt.

## 2020-10-09 ENCOUNTER — OFFICE VISIT (OUTPATIENT)
Dept: NEUROLOGY | Facility: CLINIC | Age: 55
End: 2020-10-09
Payer: COMMERCIAL

## 2020-10-09 ENCOUNTER — TELEPHONE (OUTPATIENT)
Dept: NEUROLOGY | Facility: CLINIC | Age: 55
End: 2020-10-09

## 2020-10-09 DIAGNOSIS — R27.0 ATAXIA: ICD-10-CM

## 2020-10-09 PROCEDURE — 99214 OFFICE O/P EST MOD 30 MIN: CPT | Mod: S$GLB,,, | Performed by: PSYCHIATRY & NEUROLOGY

## 2020-10-09 PROCEDURE — 99214 PR OFFICE/OUTPT VISIT, EST, LEVL IV, 30-39 MIN: ICD-10-PCS | Mod: S$GLB,,, | Performed by: PSYCHIATRY & NEUROLOGY

## 2020-10-09 RX ORDER — PREDNISONE 5 MG/1
5 TABLET ORAL DAILY
Qty: 30 TABLET | Refills: 12 | Status: SHIPPED | OUTPATIENT
Start: 2020-10-09 | End: 2021-05-17 | Stop reason: ALTCHOICE

## 2020-10-09 NOTE — ASSESSMENT & PLAN NOTE
Moderate progressive ataxia, which fits criteria for autoimmune ataxia in speed of onset, however decline despite IVIG. Now modest decline after stopping IVIG. 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    Given her MRI brain and advancing REM sleep dz I explained that this could be consistent with MSA.    Asked her to consider low dose prednisone and or reinitiating IVIG knowing risks in this indeed an immune disease. She is supportive of this idea.    If she responds well to immunotherapy, will keep suggesting IVIG/prednisone. If neurological decline advances despite this therapy, will be more consistent with a diagnosis of MSA.    No dysphagia or stridor. I explained that MSA is a serious diagnosis with supportive measures as the typical treatment.

## 2020-10-09 NOTE — PROGRESS NOTES
"  [facility] Followup  PCP/Referring Provider: Aaareferral Self  No address on file  Date of Service: 10/9/2020    Chief Complaint: Ataxia    Interval hx  Since last visit stopped IVIG since July  Dyarthria worse   Falling more    Mildly better this week on medrol pack    No orthostasis  No change to sweating  Does have REM sleep behavior which is advancing  Does have unexplained heart racing    Overall modest decline since she last saw me  Struggling to write  Can no longer do stairs.  Still having trouble writing checks   strength decreased     Speech is progressively slurred    Continues to have decreased R arm swing  Continues to have imbalance. Has been falling more - 4-5 a month.  Furniture surfing at home  Quit PT after covid due to cost    Occasional numbness or tingling in feet  No trouble swallowing  Some urinary incontinence has gotten worse - has many accidents a day  One fecal incontinence    Feels like memory is poor at times - trouble recalling names    MRI brain showed decrease in [place] contour suggesting of MSA  PAGE scans showed normal uptake  EMG was normal 2019  CT C/AB/PEL was neg for malignancy    MELVIN 65 NL  TPO POS  Notes her son was diagnosed with PANDAS and "has funny eye movements" - poor fine motor skills as a kid    Does have REM sleep behavior issues for 3 months      "PriorHPI: Nirali Kirkpatrick is a L HANDED 55 y.o. female with a medical issues significant for HTN, Anxiety, Vit B12 deficiency, who presents with ataxia for 1.5 years. She first noted 1.5 years ago her R foot was dragging and every 3-4 steps she would catch that toe. She noticed her R foot would catch on curbs. Going down steps is especially hard - she mis-steps often. She feels like she has progressively been imbalanced. Her first fall was a year ago. She has fallen more often since then. She falls twice a month now. Falls typically sideways. She holds onto her  for stability but does not use an " "assist device. Tends to have more ataxia after she's tired. She occasionally has a had tremor in her hands when starting IVs. She does at times feel clumsy with her hands. No spinning vertigo. No lightheadedness. No visual issues.    No double vision, eye drooping, head drop, dysphagia.  She was told her B12 was low normal Aug 2018, and has had 4 shots since    No dysarthria.    She saw a general neurologist  B6 - NL  B12 - 333    Brain MRI read as normal  Spine and Tspine 2018 - no central disc issues  Has not had an EMG    No fam hx ataxia, MS  Uncle has M. Gravis  Mother and brother have had tremors    PD Review of Symptoms:  Anosmia: None  Dysarthria/Hypophonia: None  Dysphagia/Sialorrhea: None  Hallucinations: None  Depression: None  Cognitive slowing: None  Urinary changes: None  Constipation: at times  Orthostasis: none  Falls: as above  Micrographia: none  Sleep issues:  -SUSAN: at times snores  -RBD: Talks and punches in her sleep  -Sleep Quality: good, wakes every 3 hours"    Review of Systems:   Review of Systems   Constitutional: Negative for fever.   HENT: Negative for congestion.    Eyes: Negative for double vision.   Respiratory: Negative for cough and shortness of breath.    Cardiovascular: Negative for chest pain and leg swelling.   Gastrointestinal: Negative for nausea.   Genitourinary: Negative for dysuria.   Musculoskeletal: Positive for falls.   Skin: Negative for rash.   Neurological: Positive for tremors. Negative for speech change and headaches.   Psychiatric/Behavioral: Negative for depression.         Current Medications:  Outpatient Encounter Medications as of 10/9/2020   Medication Sig Dispense Refill    amLODIPine (NORVASC) 5 MG tablet Take 5 mg by mouth once daily.       amoxicillin/potassium clav (AUGMENTIN ORAL)       b complex vitamins tablet Take 1 tablet by mouth once daily.      baclofen (LIORESAL) 10 MG tablet Take 0.5 tablets (5 mg total) by mouth 3 (three) times daily. 45 " tablet 11    cyanocobalamin 1,000 mcg/mL injection 1,000 mcg every 30 days.       desvenlafaxine succinate (PRISTIQ) 100 MG Tb24 100 mg once daily.       methylPREDNISolone (MEDROL, ARNAUD,) 4 mg tablet use as directed 1 Package 0    multivitamin capsule Take 1 capsule by mouth once daily.      predniSONE (DELTASONE) 5 MG tablet Take 1 tablet (5 mg total) by mouth once daily. 30 tablet 6    predniSONE (DELTASONE) 5 MG tablet Take 1 tablet (5 mg total) by mouth once daily. 30 tablet 12     No facility-administered encounter medications on file as of 10/9/2020.        Past Medical History:  HTN    Past Surgical History:  Gallbladder, gastric sleeve    Current Living Situation: home    Social:  Social History     Socioeconomic History    Marital status:      Spouse name: Not on file    Number of children: Not on file    Years of education: Not on file    Highest education level: Not on file   Occupational History    Not on file   Social Needs    Financial resource strain: Not on file    Food insecurity     Worry: Not on file     Inability: Not on file    Transportation needs     Medical: Not on file     Non-medical: Not on file   Tobacco Use    Smoking status: Never Smoker   Substance and Sexual Activity    Alcohol use: Not Currently    Drug use: Never    Sexual activity: Not on file   Lifestyle    Physical activity     Days per week: Not on file     Minutes per session: Not on file    Stress: Not on file   Relationships    Social connections     Talks on phone: Not on file     Gets together: Not on file     Attends Orthodox service: Not on file     Active member of club or organization: Not on file     Attends meetings of clubs or organizations: Not on file     Relationship status: Not on file   Other Topics Concern    Not on file   Social History Narrative    Not on file       Family History:  As above    PHYSICAL:  There were no vitals taken for this visit.    General Medical  Examination:  General: Good hygiene, appropriate appearance.  HEENT: Normocephalic, atraumatic.   Neck: Supple.   Chest: Unlabored breathing.   CV: Symmetric pulses.   Ext: No clubbing, cyanosis, or edema.     Mental Status:  Mood/Affect: Appropriate/congruent.  Level of consciousness: Awake, alert.  Orientation: Oriented to person, place, time and situation.  Language: Mod Dysarthria    Cranial nerves:  I: Not tested  II: PERRL, VFF to counting  III, IV, VI: EOMI with conjugate gaze and no nystagmus on end gaze - no SWJs  V: Facial sensation intact and symmetric over the bilateral V1-V3  VII: Facial muscle activation intact and symmetric over the bilateral upper and lower face  VIII: Hearing intact in the b/l ears and symmetrical to finger rub  IX, X, XII: TUP midline - no atrophy or fasiculations  X: SCMs and shoulder shrug full strength b/l and symmetric  Trouble with PA PA TA TA  Trouble with GAs and Naomy      Motor:   Cannot squeeze and hold 's hands   When lifting her knees she cannot overcome her  pushing it down    Hyperreflexic 3+ at pattelae    ? Finger taps Finger flicks PEDRO Heel taps   Left nl nl nl nl   Right nl nl Incoordinated nl   Neck tone: nl  ? Arm Leg   Left nl nl   Right nl nl         Coordination:   -Finger to nose: intention tremor mild bilat  Past-points 1 inch R hand >L hand  Disdiatochokinesias R hand moderate  Disdiatochokinesias L hand mild  -Heel to shin: mild issues R foot  R hand spiral +++  L hand spiral ++    No resting or postural tremor    Gait:  -Arises from chair without use of hands.  -Casual gait is: wide based, somewhat stiff, circumducts, waves moderately L to R when she walks - mod ataxic   -Stride length: nl  -Arm Swing: decreased R  -Turning: wide  -Tandem gait: cannot  No foot drop      Laboratory Data:  Results for RADHA HAHN (MRN [id number]) as of 6/3/2019 09:52   Ref. Range 5/2/2019 11:23   Folate Latest Ref Range: 4.0 - 24.0 ng/mL 10.7   Vitamin  B-12 Latest Ref Range: 180 - 914 ng/L 525   Sodium Latest Ref Range: 136 - 145 mmol/L 141   Potassium Latest Ref Range: 3.5 - 5.1 mmol/L 3.7   Chloride Latest Ref Range: 95 - 110 mmol/L 106   CO2 Latest Ref Range: 23 - 29 mmol/L 26   Anion Gap Latest Ref Range: 8 - 16 mmol/L 9   BUN, Bld Latest Ref Range: 6 - 20 mg/dL 22 (H)   Creatinine Latest Ref Range: 0.5 - 1.4 mg/dL 0.8   eGFR if non African American Latest Ref Range: >60 mL/min/1.73 m^2 >60.0   eGFR if African American Latest Ref Range: >60 mL/min/1.73 m^2 >60.0   Glucose Latest Ref Range: 70 - 110 mg/dL 92   Calcium Latest Ref Range: 8.7 - 10.5 mg/dL 9.8   Alkaline Phosphatase Latest Ref Range: 55 - 135 U/L 86   PROTEIN TOTAL Latest Ref Range: 6.0 - 8.4 g/dL 7.4   Albumin Latest Ref Range: 3.5 - 5.2 g/dL 4.2   BILIRUBIN TOTAL Latest Ref Range: 0.1 - 1.0 mg/dL 0.6   AST Latest Ref Range: 10 - 40 U/L 19   ALT Latest Ref Range: 10 - 44 U/L 16   Ammonia Latest Ref Range: 10 - 50 umol/L 26   Arsenic Latest Ref Range: 0 - 12 ng/mL <1   Thiamine Latest Ref Range: 38 - 122 ug/L 63   Vitamin E Latest Ref Range: 500 - 1800 ug/dL 1302   Glutamic Acid Decarb Ab Latest Ref Range: <=0.02 nmol/L 0.00   TSH Latest Ref Range: 0.400 - 4.000 uIU/mL 0.649   PTH Latest Ref Range: 9.0 - 77.0 pg/mL 103.0 (H)   Race Unknown Test Not Performed   AChR Binding Ab, Serum Latest Ref Range: <=0.02 nmol/L 0.00   AChR Ganglionic Neuronal Ab Latest Ref Range: <=0.02 nmol/L 0.00   CRMP-5 IgG Latest Ref Range: <1:240 titer Negative   Neuronal (V-G) K+ Channel Ab, Serum Latest Ref Range: <=0.02 nmol/L 0.00   NMO Interpretive Comments Unknown SEE BELOW   N-Type Calcium Channel Ab Latest Ref Range: <=0.03 nmol/L 0.00   P/Q Type Calcium Channel Ab Latest Ref Range: <=0.02 nmol/L 0.00   PAVAL AGNA-1, Serum Latest Ref Range: <1:240 titer Negative   PAVAL TOBY-1, Serum Latest Ref Range: <1:240 titer Negative   PAVAL TOBY-2, Serum Latest Ref Range: <1:240 titer Negative   PAVAL TOBY-3, Serum Latest  Ref Range: <1:240 titer Negative   PAVAL reflex test added Unknown None.   PAVAL,  Amphiphysin Ab, Serum Latest Ref Range: <1:240 titer Negative   PAVAL, PCA-1, Serum Latest Ref Range: <1:240 titer Negative   PAVAL, PCA-2, Serum Latest Ref Range: <1:240 titer Negative   PAVAL, PCA-Tr, Serum Latest Ref Range: <1:240 titer Negative   STRIATED MUSCLE AB Latest Ref Range: <1:120 titer Negative   Lead Latest Ref Range: 0.0 - 4.9 mcg/dL <1.0   Cadmium Latest Ref Range: 0.0 - 4.9 ng/mL 0.3   City Unknown Test Not Performed   County Unknown Test Not Performed   Guardian First Name Unknown Test Not Performed   Guardian Last Name Unknown Test Not Performed   Home Phone Unknown Test Not Performed   [place] Latest Ref Range: 0 - 9 ng/mL 2   State Unknown Test Not Performed   Street Address Unknown Test Not Performed   Venous/Capillary Unknown Test Not Performed   Zip Unknown Test Not Performed      Results for RADHA HAHN (MRN [id number]) as of 8/23/2019 10:10   Ref. Range 6/3/2019 10:27   Sed Rate Latest Ref Range: 0 - 36 mm/Hr 13   CRP Latest Ref Range: 0.0 - 8.2 mg/L 0.5   CPK Latest Ref Range: 20 - 180 U/L 55   CERULOPLASMIN Latest Ref Range: 15.0 - 45.0 mg/dL 33.0   Acetylchol Modul Ab Latest Units: % 0   AChR Binding Ab, Serum Latest Ref Range: <=0.02 nmol/L 0.00   MG Interpretive Comments Unknown SEE BELOW   MuSK Antibody Test Latest Ref Range: 0.00 - 0.02 nmol/L 0.00   STRIATED MUSCLE AB Latest Ref Range: <1:120 titer Negative   Aldolase Latest Ref Range: 1.2 - 7.6 U/L 3.5       Imaging:  Brain MRI w/o cerebellar degeneration or lesion - however poor quality MRI  Repeat brain MRI  FINDINGS:  MRI of the brain demonstrates no infarction, mass, hemorrhage, extra-axial collection, or other acute finding.  A skull base lesions are seen.  Following contrast administration, there is no abnormal enhancing lesion identified.  A small incidental developmental venous anomaly is seen in the right medial posterior  thalamus.  Cspine w/o without central impingement    EMG  Summary   Nerve conduction study of bilateral lower extremities revealed normal antidromic sensory peak latencies, action potentials, and conduction velocities.   Motor peak latencies, amplitudes, and conduction velocities were normal. F-waves were normal.   Concentric needle examination of selected muscles in bilateral lower extremities revealed no evidence of acute or chronic denervation.   Impression   This is a normal study.     Assessment//Plan:   Problem List Items Addressed This Visit        Neuro    Ataxia    Current Assessment & Plan     Moderate progressive ataxia, which fits criteria for autoimmune ataxia in speed of onset, however decline despite IVIG. Now modest decline after stopping IVIG. 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    Given her MRI brain and advancing REM sleep dz I explained that this could be consistent with MSA.    Asked her to consider low dose prednisone and or reinitiating IVIG knowing risks in this indeed an immune disease. She is supportive of this idea.    If she responds well to immunotherapy, will keep suggesting IVIG/prednisone. If neurological decline advances despite this therapy, will be more consistent with a diagnosis of MSA.    No dysphagia or stridor. I explained that MSA is a serious diagnosis with supportive measures as the typical treatment.                 Ondina Polo MD, MS  [facility]  Department of Neurology  Movement Disorders

## 2020-10-09 NOTE — TELEPHONE ENCOUNTER
----- Message from Ilan Barrett sent at 10/9/2020  8:10 AM CDT -----  Contact: pt  Please call pt at [phone]    Patient is running about 10 mins late     Thank you

## 2020-10-12 ENCOUNTER — CLINICAL SUPPORT (OUTPATIENT)
Dept: REHABILITATION | Facility: HOSPITAL | Age: 55
End: 2020-10-12
Attending: PSYCHIATRY & NEUROLOGY
Payer: COMMERCIAL

## 2020-10-12 DIAGNOSIS — R26.81 GAIT INSTABILITY: ICD-10-CM

## 2020-10-12 PROCEDURE — 97110 THERAPEUTIC EXERCISES: CPT | Mod: PN,CQ

## 2020-10-12 NOTE — PROGRESS NOTES
Physical Therapy Daily Treatment Note     Time In: 0900  Time Out: 0945  Total Billable Time: 40 minutes    Name: Nirali Kirkpatrick  Clinic Number: [id number]    Therapy Diagnosis: Gait instability  Physician: Ondina Polo MD    Visit Date: 10/12/2020    Physician Orders: PT Eval and Treat   Medical Diagnosis from Referral: R27.0 (ICD-10-CM) - Ataxia  Evaluation Date: 9/29/2020  Authorization Period Expiration: 12/31/2020  Plan of Care Expiration: 11/16/2020  Visit # / Visits authorized: 2/ 25     Precautions: Falls, HTN    Subjective     Pt reports: no pain. Brought to clinic per sister  Response to previous treatment: no soreness  Functional change: none stated    Pain: 0/10  Location: L foot/ankle    Objective     Nirali received therapeutic exercises to develop strength, endurance, ROM and flexibility for 40 minutes including:    NuStep seated stepper level 3 x 10 min LE's only    Seated therex: x 20   LAQ 4# L/R   Hip marches 4# L/R   DF B    // bars:   Standing runner's stretch 3/30 sec L/R   Double heel/toe raise x 30   Minisquats x 30 with cues for proper execution   Tandem gait fwd/bwd 10 ft x 4   Side stepping 10 ft x 4 L/R   Wide-based march 10/2     Education provided:     Instructed pt to practice cone tapping with beckie cups for proprioception at home   Educated pt that he/she may feel soreness after session.     Written Home Exercises Provided: Patient instructed to cont prior HEP.  Exercises were reviewed and Nirali was able to demonstrate them prior to the end of the session.  Nirali demonstrated good  understanding of the education provided.     See EMR under Media for exercises provided 10/5/2020.    Assessment     R LE tones with difficulty maintaining flat foot with supination, requiring VC for safety with standing ex's.     Nirali is progressing well towards her goals.   Pt prognosis is Good.     Pt will continue to benefit from skilled outpatient physical therapy to address the  deficits listed in the problem list box on initial evaluation, provide pt/family education and to maximize pt's level of independence in the home and community environment.     Pt's spiritual, cultural and educational needs considered and pt agreeable to plan of care and goals.     Anticipated barriers to physical therapy:     Goals:  Short Term Goals: 3 weeks   1) Patient will initiate HEP (ongoing)  2) Patient will improve strength by 1/2 muscle grade (ongoing)     Long Term Goals: 6 weeks   1) Patient will be independent in HEP  2) Patient will be able to perform sit to stand without UE support at SBA  3) Patient will improve functional outcome measure LEFS to <40% impairment       Short Term Goal Status:  1) Met  2) Partially met/ongoing    Long Term Goal Status:  1) Met  2) Not met  3) Not assessed this date        Plan     Continue with established Plan of Care towards PT goals. Progress strengthening/balance program per pt tolerance.

## 2020-10-13 ENCOUNTER — TELEPHONE (OUTPATIENT)
Dept: NEUROLOGY | Facility: CLINIC | Age: 55
End: 2020-10-13

## 2020-10-13 ENCOUNTER — PATIENT MESSAGE (OUTPATIENT)
Dept: NEUROLOGY | Facility: CLINIC | Age: 55
End: 2020-10-13

## 2020-10-13 NOTE — TELEPHONE ENCOUNTER
----- Message from Liz Kothari sent at 10/13/2020  2:25 PM CDT -----  [facility] is calling to see move forward with the therapy asking for a call back.    Contact info [phone] option 1, option 4

## 2020-10-14 NOTE — TELEPHONE ENCOUNTER
Spoke with Samantha.   Verified that orders should go to [facility].   Faxed IVIG orders to [facility].    Expect they will call for clarification.

## 2020-10-16 ENCOUNTER — CLINICAL SUPPORT (OUTPATIENT)
Dept: REHABILITATION | Facility: HOSPITAL | Age: 55
End: 2020-10-16
Attending: PSYCHIATRY & NEUROLOGY
Payer: COMMERCIAL

## 2020-10-16 DIAGNOSIS — R26.81 GAIT INSTABILITY: ICD-10-CM

## 2020-10-16 PROCEDURE — 97112 NEUROMUSCULAR REEDUCATION: CPT | Mod: PN,CQ

## 2020-10-16 NOTE — PROGRESS NOTES
Physical Therapy Treatment Note     Name: Nirali Kirkpatrick  Clinic Number: [id number]    Therapy Diagnosis:   Encounter Diagnosis   Name Primary?    Gait instability      Physician: Ondina Polo MD    Visit Date: 10/16/2020    Physician Orders: PT Eval and Treat   Medical Diagnosis from Referral: R27.0 (ICD-10-CM) - Ataxia  Evaluation Date: 9/29/2020  Authorization Period Expiration: 12/31/2020  Plan of Care Expiration: 11/16/2020  Visit # / Visits authorized: 4/25       Time In: 0815  Time Out: 0855  Total Billable Time: 40 minutes    Precautions: Fall and HTN    Subjective     Pt reports: Doing HEP at home without difficulty.  She was compliant with home exercise program.  Response to previous treatment: no problem  Functional change: none stated    Pain: 0/10  Location:       Objective     Nirali participated in neuromuscular re-education activities to improve: Balance, Coordination, Kinesthetic, Sense, Proprioception and Posture for 40 minutes. The following activities were included:    NuStep [id number]'  Sit<>Stand x10 with A for ant lean (mod>min)    // bar: HR/TR x30   Tandem gait Fw/Bk 10' x4   Side step L<>R x1 1/2 (Major LOB with A to regain upright)    Home Exercises Provided and Patient Education Provided     Education provided:   - Safety with sit<>stand and stand HEP to include rest breaks.    Written Home Exercises Provided: Patient instructed to cont prior HEP.  Exercises were reviewed and Nirali was able to demonstrate them prior to the end of the session.  Nirali demonstrated good  understanding of the education provided.     See EMR under Patient Instructions for exercises provided prior visit.    Assessment     Patient with major LOB with prolonged // bar activities.    Nirali is progressing well towards her goals.   Pt prognosis is Good.     Pt will continue to benefit from skilled outpatient physical therapy to address the deficits listed in the problem list box on initial evaluation,  provide pt/family education and to maximize pt's level of independence in the home and community environment.     Pt's spiritual, cultural and educational needs considered and pt agreeable to plan of care and goals.     Anticipated barriers to physical therapy: none    Goals:     Short Term Goals: 3 weeks   1) Patient will initiate HEP (met)  2) Patient will improve strength by 1/2 muscle grade      Long Term Goals: 6 weeks   1) Patient will be independent in HEP (met)  2) Patient will be able to perform sit to stand without UE support at SBA  3) Patient will improve functional outcome measure LEFS to <40% impairment       Plan     Continue with POC to increase activity endurance as patient tolerates.    Neha Lujan, PTA

## 2020-10-23 ENCOUNTER — CLINICAL SUPPORT (OUTPATIENT)
Dept: REHABILITATION | Facility: HOSPITAL | Age: 55
End: 2020-10-23
Attending: PSYCHIATRY & NEUROLOGY
Payer: COMMERCIAL

## 2020-10-23 DIAGNOSIS — R26.81 GAIT INSTABILITY: ICD-10-CM

## 2020-10-23 PROCEDURE — 97112 NEUROMUSCULAR REEDUCATION: CPT | Mod: PN,CQ

## 2020-10-23 NOTE — PROGRESS NOTES
Physical Therapy Treatment Note     Name: Nirali Kirkpatrick  Clinic Number: [id number]    Therapy Diagnosis:   Encounter Diagnosis   Name Primary?    Gait instability      Physician: Ondina Polo MD    Visit Date: 10/23/2020    Physician Orders: PT Eval and Treat   Medical Diagnosis from Referral: R27.0 (ICD-10-CM) - Ataxia  Evaluation Date: 9/29/2020  Authorization Period Expiration: 12/31/2020  Plan of Care Expiration: 11/16/2020  Visit # / Visits authorized: 5/25      Time In: 0947  Time Out: 1030  Total Billable Time: 43 minutes    Precautions: Fall and HTN    Subjective     Pt reports: Reports had 2 falls without injuries, reports was moving too fast. Patient reports difficulty with going up steps.  She was compliant with home exercise program.  Response to previous treatment: no problem  Functional change: none stated    Pain: 0/10  Location:      Objective     Nirali participated in neuromuscular re-education activities to improve: Balance, Coordination, Kinesthetic, Sense, Proprioception and Posture for 47 minutes. The following activities were included:     Sit<>Stand x10 SBA with hands on knees   // bar:   HR/TR x30              Tandem gait Fw/Bk 10' x2              Side step L<>R x2    March x30   Hip abd x30 alt   Hip ext x30 alt   Sit rests with stand activities    Home Exercises Provided and Patient Education Provided     Education provided:   - Rest breaks with strenuous activities.    Written Home Exercises Provided: Patient instructed to cont prior HEP.  Exercises were reviewed and Nirali was able to demonstrate them prior to the end of the session.  Nirali demonstrated good  understanding of the education provided.     See EMR under Patient Instructions for exercises provided prior visit.    Assessment     Patient tolerated activities with sit rest breaks.  Nirali is progressing well towards her goals.   Pt prognosis is Fair.     Pt will continue to benefit from skilled outpatient  physical therapy to address the deficits listed in the problem list box on initial evaluation, provide pt/family education and to maximize pt's level of independence in the home and community environment.     Pt's spiritual, cultural and educational needs considered and pt agreeable to plan of care and goals.     Anticipated barriers to physical therapy: fatigue leading to increased LOB    Goals:     Short Term Goals: 3 weeks   1) Patient will initiate HEP (met)  2) Patient will improve strength by 1/2 muscle grade      Long Term Goals: 6 weeks   1) Patient will be independent in HEP (met)  2) Patient will be able to perform sit to stand without UE support at SBA (progressing)  3) Patient will improve functional outcome measure LEFS to <40% impairment         Plan     Continue with POC to increase activity endurance as patient tolerates.    Neha Lujan, PTA

## 2020-11-11 ENCOUNTER — TELEPHONE (OUTPATIENT)
Dept: NEUROLOGY | Facility: CLINIC | Age: 55
End: 2020-11-11

## 2020-11-12 NOTE — TELEPHONE ENCOUNTER
----- Message from Lanette Murphy sent at 11/11/2020  3:13 PM CST -----  Contact: [phone]  [facility] would like to speak with the nurse regarding pt hydration

## 2020-11-13 ENCOUNTER — CLINICAL SUPPORT (OUTPATIENT)
Dept: REHABILITATION | Facility: HOSPITAL | Age: 55
End: 2020-11-13
Attending: PSYCHIATRY & NEUROLOGY
Payer: COMMERCIAL

## 2020-11-13 DIAGNOSIS — R26.81 GAIT INSTABILITY: ICD-10-CM

## 2020-11-13 PROCEDURE — 97110 THERAPEUTIC EXERCISES: CPT | Mod: PN

## 2020-11-13 NOTE — PROGRESS NOTES
Physical Therapy Daily Treatment Note     Time In: 0835  Time Out: 0913  Total Billable Time: 38 minutes    Name: Nirali Kirkpatrick  Clinic Number: [id number]    Therapy Diagnosis:   Encounter Diagnosis   Name Primary?    Gait instability      Physician: Ondina Polo MD    Visit Date: 11/13/2020    Physician Orders: PT Eval and Treat   Medical Diagnosis from Referral: R27.0 (ICD-10-CM) - Ataxia  Evaluation Date: 9/29/2020  Authorization Period Expiration: 12/31/2020  Plan of Care Expiration: 11/16/2020  Visit # / Visits authorized: 5/25         Precautions: Falls, HTN    Subjective     Pt reports: no changes.  She was compliant with home exercise program.  Response to previous treatment: no complaints  Functional change: no    Pain: 0/10  Location:     Objective     Nirali received therapeutic exercises to develop strength, endurance, ROM and flexibility for 38 minutes including:    Scifit stepper L4 x 10 min UE's/LE's  Seated hamstring stretch 3/30 sec L/R  Sit to stand: 5 reps x 2 with min A/CGA    Standing // bars:   Gastroc stretch using 1/2 foam roll 3/30 sec B   HR/TR x 30   Side stepping L<>R   Marches x 30   Hip abduction x 30 (alternating)   Hip extensions x 30 (alternating)   Minisquats x 20    Education provided:     - Continue with current home exercise program as instructed  - Discussed monitoring symptoms and stopping activities which cause increased pain      Written Home Exercises Provided: Patient instructed to cont prior HEP.  Exercises were reviewed and Nirali was able to demonstrate them prior to the end of the session.  Nirali demonstrated good  understanding of the education provided.     See EMR under Media for exercises provided 10/5/2020.    Assessment     Requiring rest breaks.   Nirali is progressing well towards her goals.   Pt prognosis is Fair.     Pt will continue to benefit from skilled outpatient physical therapy to address the deficits listed in the problem list box on  initial evaluation, provide pt/family education and to maximize pt's level of independence in the home and community environment.     Pt's spiritual, cultural and educational needs considered and pt agreeable to plan of care and goals.     Anticipated barriers to physical therapy:     Goals:  Short Term Goals: 3 weeks   1) Patient will initiate HEP  2) Patient will improve strength by 1/2 muscle grade     Long Term Goals: 6 weeks   1) Patient will be independent in HEP  2) Patient will be able to perform sit to stand without UE support at SBA  3) Patient will improve functional outcome measure LEFS to <40% impairment        Short Term Goal Status:  1) Met  2) Partially met/ongoing     Long Term Goal Status:  1) Met  2) Not met  3) Not assessed this date       Plan     Continue with established Plan of Care towards PT goals. Progress strengthening/balance program per pt tolerance.

## 2020-11-17 ENCOUNTER — PATIENT MESSAGE (OUTPATIENT)
Dept: NEUROLOGY | Facility: CLINIC | Age: 55
End: 2020-11-17

## 2020-12-04 ENCOUNTER — CLINICAL SUPPORT (OUTPATIENT)
Dept: REHABILITATION | Facility: HOSPITAL | Age: 55
End: 2020-12-04
Attending: PSYCHIATRY & NEUROLOGY
Payer: COMMERCIAL

## 2020-12-04 DIAGNOSIS — R26.81 GAIT INSTABILITY: ICD-10-CM

## 2020-12-04 PROCEDURE — 97110 THERAPEUTIC EXERCISES: CPT | Mod: PN

## 2020-12-05 NOTE — PLAN OF CARE
Outpatient Therapy Updated Plan of Care     Visit Date: 12/4/2020    Name: Nirali Kirkpatrick  Clinic Number: [id number]    Therapy Diagnosis:   Encounter Diagnosis   Name Primary?    Gait instability      Physician: Ondina Pool MD    Physician Orders: PT Eval and Treat   Medical Diagnosis from Referral: R27.0 (ICD-10-CM) - Ataxia  Evaluation Date: 9/29/2020  Authorization Period Expiration: 12/31/2020  Plan of Care Expiration: 11/16/2020  Visit # / Visits authorized: 8/25          Precautions: Falls, HTN  Functional Level Prior to Evaluation:  Decreased functional mobility    Subjective     Update: Patient reports continued unsteadiness and falls, although has not had a fall in about 2 weeks. She has followed up with her neurologist and has re-started IVIG treatment, which has caused her to cancel several of her therapy appts. She doesn't think the IVIG helps improve her symptoms generally, but feels like it helped prevent regression. 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 She reports no falls at work, but does hold onto furniture/walls throughout her shifts to maintain balance.    Objective     Update:     Gait: no AD; ataxic with wide base of support, decreased floor clearance R>L, CGA to close SBA    Sit to stand without use of armrests at SBA/CGA: pressing hands against thighs for support    360* turning in place: unstable with 1 significant LOB when turning to R requiring min A to prevent fall, no LOB turning to L, required CGA for safety. Cues given to walk closer to door prior to opening it (instead of excessively reaching fwd), repeated cueing when she reached for her phone on a mat prior to leaving gym. She reports fwd reaching is a compensation since she cannot walk bwds without losing her balance when she finishes picking things up. Suggested carefully turning around to walk fwd or side stepping instead of walking bwds.     Lower Extremity Range of Motion:  Right  Lower Extremity: WFL  Left Lower Extremity: WFL     Lower Extremity Strength:  Right Lower Extremity: grossly 4/5  Left Lower Extremity: grossly 4/5    Lower Extremity Functional Scale (LEFS):32/80=60% impairment    Nirali received therapeutic exercises to develop strength, endurance, ROM, flexibility and balance for 25 minutes including:    Scifit stepper L4 x 10 min  Seated hamstring stretch 3/30 sec L/R    // bars:   Standing gastroc stretch using 1/2 foam roll 3/30 sec B   Minisquats x 10   Side stepping 10 ft x 4   HR/TR x 20              HR/TR x 30              Side stepping L<>R   Standing abduction (alternating) x 20       Assessment     Update: Patient has attended 5 treatment sessions out of 11 prescribed at initial evaluation due to scheduling difficulties. Upon assessment, pt with improvement of strength compared to initial evaluation as well as mild improvement of sit to stand transfer (hands pressing against thighs rather than requiring armrests for support) and dynamic balance with 360 ° turn in place (required min A for 1 LOB, rather than multiple LOB requiring assist at initial evaluation). Patient continues to have severe ataxia and balance deficits as well as significant functional impairments. Patient may benefit from continued physical therapy to address strength, balance, and gait to improve functional mobility and decrease fall risk.    Goals:  Short Term Goals: 3 weeks   1) Patient will initiate HEP  2) Patient will improve strength by 1/2 muscle grade     Long Term Goals: 6 weeks   1) Patient will be independent in HEP  2) Patient will be able to perform sit to stand without UE support at SBA  3) Patient will improve functional outcome measure LEFS to <40% impairment    Previous Short Term Goals Status:   1=met 2=partially met  New Short Term Goals:     1) Patient will improve LE strength by 1/2 muscle grade  2) Patient will initiate bwds gait training in // bars with close supervision/CGA    3) Patient will initiate step training in // bars to improve ability to navigate curbs/steps in community  Long Term Goals:   modified:    1) Patient will be independent in HEP  2) Patient will be able to perform sit to stand without UE support at SBA with good mechanics  3) Patient will improve functional outcome measure LEFS to <40% impairment  Reasons for Recertification of Therapy:   Weakness, gait instability, decreased static and dynamic balance, impaired functional mobility including walking, squatting, and stair negotiation    Plan     Updated Certification Period: 12/4/2020 to 01/29/2021  Recommended Treatment Plan: 2 times per week for 4-6 weeks: Gait Training, Manual Therapy, Moist Heat/ Ice, Neuromuscular Re-ed, Patient Education, Therapeutic Activites and Therapeutic Exercise  Other Recommendations: multidirectional gait training, squat and step training, static/dynamic balance, LE strengthening, and possibly AD training     Ban Lora, PT  12/4/2020      I CERTIFY THE NEED FOR THESE SERVICES FURNISHED UNDER THIS PLAN OF TREATMENT AND WHILE UNDER MY CARE    Physician's comments:        Physician's Signature: ___________________________________________________

## 2020-12-11 ENCOUNTER — CLINICAL SUPPORT (OUTPATIENT)
Dept: REHABILITATION | Facility: HOSPITAL | Age: 55
End: 2020-12-11
Attending: PSYCHIATRY & NEUROLOGY
Payer: COMMERCIAL

## 2020-12-11 DIAGNOSIS — R26.81 GAIT INSTABILITY: ICD-10-CM

## 2020-12-11 PROCEDURE — 97112 NEUROMUSCULAR REEDUCATION: CPT | Mod: PN,CQ

## 2020-12-11 PROCEDURE — 97110 THERAPEUTIC EXERCISES: CPT | Mod: PN,CQ

## 2020-12-11 NOTE — PROGRESS NOTES
"  Physical Therapy Treatment Note     Name: Nirali Kirkpatrick  Clinic Number: [id number]    Therapy Diagnosis:   Encounter Diagnosis   Name Primary?    Gait instability      Physician: Ondina Polo MD    Visit Date: 12/11/2020    Physician Orders: PT Eval and Treat   Medical Diagnosis from Referral: R27.0 (ICD-10-CM) - Ataxia  Evaluation Date: 9/29/2020  Authorization Period Expiration: 12/31/2020  Plan of Care Expiration: 01/29/2021  Visit # / Visits authorized: 9/25     Time In: 1025  Time Out: 1110  Total Billable Time: 45 minutes    Precautions: Fall and HTN    Subjective     Pt reports: Fatigued from busy week.  She was compliant with home exercise program.  Response to previous treatment: no problem  Functional change: none stated    Pain: 0/10  Location:       Objective     Nirali received therapeutic exercises to develop strength, endurance and flexibility for 20 minutes including:    NuStep Lv4 10'  Hamstring stretch 3x30" sit with stool R/L  Gastroc stretch 3x30" 1/2 roll R/L    Nirali participated in neuromuscular re-education activities to improve: Balance, Coordination, Kinesthetic, Sense, Proprioception and Posture for 25 minutes. The following activities were included:    // bar: Tandem gait 10' x4 fwd/back   Step Up 6" box x20 R/L (cues to increase hip flexion B)   Side step up/over 6" box x10 R<>L   Hip abd x20 R/L alternating   HR/TR x30 (cues to decrease rocking)   Mini squat x30 (cues for increased post WB)      Home Exercises Provided and Patient Education Provided     Education provided:   - Cues for direction and proper form.    Written Home Exercises Provided: Patient instructed to cont prior HEP.  Exercises were reviewed and Nirali was able to demonstrate them prior to the end of the session.  Nirali demonstrated good  understanding of the education provided.     See EMR under Media for exercises provided prior visit.    Assessment     Patient tolerated activities with correction for " proper form and safety. All activities with SBA.    Nirali is progressing towards her goals.   Pt prognosis is Good.     Pt will continue to benefit from skilled outpatient physical therapy to address the deficits listed in the problem list box on initial evaluation, provide pt/family education and to maximize pt's level of independence in the home and community environment.     Pt's spiritual, cultural and educational needs considered and pt agreeable to plan of care and goals.     Anticipated barriers to physical therapy: none    Goals:     New Short Term Goals:     1) Patient will improve LE strength by 1/2 muscle grade  2) Patient will initiate bwds gait training in // bars with close supervision/CGA  (progressing)  3) Patient will initiate step training in // bars to improve ability to navigate curbs/steps in community (progressing)  Long Term Goals:   modified:    1) Patient will be independent in HEP  2) Patient will be able to perform sit to stand without UE support at SBA with good mechanics  3) Patient will improve functional outcome measure LEFS to <40% impairment      Plan     Continue with POC to increase activities as patient tolerates.    Neha Lujan, PTA

## 2020-12-14 ENCOUNTER — CLINICAL SUPPORT (OUTPATIENT)
Dept: REHABILITATION | Facility: HOSPITAL | Age: 55
End: 2020-12-14
Attending: PSYCHIATRY & NEUROLOGY
Payer: COMMERCIAL

## 2020-12-14 DIAGNOSIS — R26.81 GAIT INSTABILITY: ICD-10-CM

## 2020-12-14 PROCEDURE — 97110 THERAPEUTIC EXERCISES: CPT | Mod: PN

## 2020-12-14 NOTE — PROGRESS NOTES
"  Physical Therapy Daily Treatment Note     Time In: 1505  Time Out: 1545  Total Billable Time: 40 minutes    Name: Nirali Kirkpatrick  Clinic Number: [id number]    Therapy Diagnosis:   Encounter Diagnosis   Name Primary?    Gait instability      Physician: Ondina Polo MD    Visit Date: 12/14/2020    Physician Orders: PT Eval and Treat   Medical Diagnosis from Referral: R27.0 (ICD-10-CM) - Ataxia  Evaluation Date: 9/29/2020  Authorization Period Expiration: 12/31/2020  Plan of Care Expiration: 11/16/2020  Visit # / Visits authorized: 10/25          Precautions: Falls, HTN    Subjective     Pt reports: no changes.  She was compliant with home exercise program.  Response to previous treatment: mild fatigue, "enough to take a nap afterward."  Functional change: no    Pain: 0/10  Location:     Objective     Nirali received therapeutic exercises to develop strength, endurance, ROM and flexibility for 40 minutes including:    Nustep L4 x 10 min LE's only  Seated hamstring stretch 3/30 sec L/R  Standing gastroc stretch using 1/2 foam roll 3/30 sec L/R  HR/TR x 20  Minisquats x 20  Standing hip ABD x 20 alternating L/R  SLS 3/30 sec L/R with bilateral UE support  Tandem gait fwd/bwd 10 ft x 2 each  Step ups fwd x 10 leading L/R each  Lateral step-ups/over x 10 L/R    Education provided:     - Continue with current home exercise program as instructed  - Discussed monitoring symptoms and stopping activities which cause increased pain  - Confirmed date/time of next appointment    Written Home Exercises Provided: Patient instructed to cont prior HEP.  Exercises were reviewed and Nirali was able to demonstrate them prior to the end of the session.  Nirali demonstrated good  understanding of the education provided.      See EMR under Media for exercises provided 10/5/2020.       Assessment     Nirali is progressing well towards her goals.   Pt prognosis is Good.     Pt will continue to benefit from skilled outpatient " physical therapy to address the deficits listed in the problem list box on initial evaluation, provide pt/family education and to maximize pt's level of independence in the home and community environment.     Pt's spiritual, cultural and educational needs considered and pt agreeable to plan of care and goals.     Anticipated barriers to physical therapy:     Goals:  Short Term Goals: 3 weeks   1) Patient will initiate HEP  2) Patient will improve strength by 1/2 muscle grade     Long Term Goals: 6 weeks   1) Patient will be independent in HEP  2) Patient will be able to perform sit to stand without UE support at SBA  3) Patient will improve functional outcome measure LEFS to <40% impairment        Short Term Goal Status:  1) Met  2) Partially met/ongoing     Long Term Goal Status:  1) Met  2) Not met  3) Not assessed this date        Plan      Continue with established Plan of Care towards PT goals. Progress strengthening/balance program per pt tolerance.

## 2020-12-16 ENCOUNTER — CLINICAL SUPPORT (OUTPATIENT)
Dept: REHABILITATION | Facility: HOSPITAL | Age: 55
End: 2020-12-16
Attending: PSYCHIATRY & NEUROLOGY
Payer: COMMERCIAL

## 2020-12-16 DIAGNOSIS — R26.81 GAIT INSTABILITY: ICD-10-CM

## 2020-12-16 PROCEDURE — 97530 THERAPEUTIC ACTIVITIES: CPT | Mod: PN

## 2020-12-16 PROCEDURE — 97110 THERAPEUTIC EXERCISES: CPT | Mod: PN

## 2020-12-16 NOTE — PROGRESS NOTES
"  Physical Therapy Daily Treatment Note     Time In: 745  Time Out: 830  Total Billable Time: 45 minutes    Name: Nirali Kirkpatrick  Clinic Number: [id number]    Therapy Diagnosis:   Encounter Diagnosis   Name Primary?    Gait instability      Physician: Ondina Polo MD    Visit Date: 12/16/2020    Physician Orders: PT Eval and Treat   Medical Diagnosis from Referral: R27.0 (ICD-10-CM) - Ataxia  Evaluation Date: 9/29/2020  Authorization Period Expiration: 12/31/2020  Plan of Care Expiration: 11/16/2020  Visit # / Visits authorized: 10/25          Precautions: Falls, HTN    Subjective     Pt reports: she fell a couple of days ago, but reports no injury. States "I've learned how to fall good."  She was compliant with home exercise program.  Response to previous treatment: mild fatigue, "enough to take a nap afterward."  Functional change: no    Pain: 0/10  Location: n/a    Objective     Nirali received therapeutic exercises to develop strength, endurance, ROM and flexibility for 20 minutes including:    Nustep L4 x 10 min LE's only  Seated hamstring stretch 3/30 sec L/R  Standing gastroc stretch using 1/2 foam roll 3/30 sec L/R  HR/TR x 20  Minisquats x 20  Standing hip ABD x 30 alternating L/R    Nirali participated in dynamic functional therapeutic activities to improve functional performance for 25  minutes, including:    SLS 3/30 sec L/R with bilateral UE support  Tandem gait fwd/bwd 10 ft x 2 each  2 point toe taps (fwd/lateral) with regression of UE support x 10 ea leg  Alternating fwd toe taps without UE support x 15  Alternating toe taps 2" step UE support as needed x 20  Step ups fwd x 10 leading L/R each, green step UE support throughout  Lateral step-ups/over x 10 L/R green step    Education provided:     - Continue with current home exercise program as instructed  - Discussed monitoring symptoms and stopping activities which cause increased pain  - Confirmed date/time of next appointment    Written " Home Exercises Provided: Patient instructed to cont prior HEP.  Exercises were reviewed and Nirali was able to demonstrate them prior to the end of the session.  Nirali demonstrated good  understanding of the education provided.      See EMR under Media for exercises provided 10/5/2020.       Assessment     Nirali continues to rely on external support for balance and reactions to imbalances. Patient would benefit from continued NMR with regression of reliance on // bars and other external support to reduce risk of falls at home.   Pt prognosis is Good.     Pt will continue to benefit from skilled outpatient physical therapy to address the deficits listed in the problem list box on initial evaluation, provide pt/family education and to maximize pt's level of independence in the home and community environment.     Pt's spiritual, cultural and educational needs considered and pt agreeable to plan of care and goals.     Anticipated barriers to physical therapy:     Goals:  Short Term Goals: 3 weeks   1) Patient will initiate HEP  2) Patient will improve strength by 1/2 muscle grade     Long Term Goals: 6 weeks   1) Patient will be independent in HEP  2) Patient will be able to perform sit to stand without UE support at SBA  3) Patient will improve functional outcome measure LEFS to <40% impairment        Short Term Goal Status:  1) Met  2) Partially met/ongoing     Long Term Goal Status:  1) Met  2) Not met  3) Not assessed this date        Plan      Continue with established Plan of Care towards PT goals. Progress strengthening/balance program per pt tolerance.

## 2020-12-21 ENCOUNTER — PATIENT MESSAGE (OUTPATIENT)
Dept: NEUROLOGY | Facility: CLINIC | Age: 55
End: 2020-12-21

## 2020-12-21 ENCOUNTER — CLINICAL SUPPORT (OUTPATIENT)
Dept: REHABILITATION | Facility: HOSPITAL | Age: 55
End: 2020-12-21
Attending: PSYCHIATRY & NEUROLOGY
Payer: COMMERCIAL

## 2020-12-21 DIAGNOSIS — R26.81 GAIT INSTABILITY: ICD-10-CM

## 2020-12-21 PROCEDURE — 97110 THERAPEUTIC EXERCISES: CPT | Mod: PN,CQ

## 2020-12-21 PROCEDURE — 97112 NEUROMUSCULAR REEDUCATION: CPT | Mod: PN,CQ

## 2020-12-21 NOTE — PROGRESS NOTES
"  Physical Therapy Daily Treatment Note     Time In: 0917  Time Out:1000  Total Billable Time: 42 minutes    Name: Nirali Kirkpatrick  Clinic Number: [id number]    Therapy Diagnosis:   Encounter Diagnosis   Name Primary?    Gait instability      Physician: Ondina Polo MD    Visit Date: 12/21/2020    Physician Orders: PT Eval and Treat   Medical Diagnosis from Referral: R27.0 (ICD-10-CM) - Ataxia  Evaluation Date: 9/29/2020  Authorization Period Expiration: 12/31/2020  Plan of Care Certification Period: 12/4/2020 to 01/29/2021  Visit # / Visits authorized: 12/25       Precautions: Falls, HTN    Subjective     Pt reports: no complaints today  She was compliant with home exercise program.  Response to previous treatment: mild fatigue, "enough to take a nap afterward."  Functional change: no    Pain: 0/10  Location: n/a    Objective     Nirali received therapeutic exercises to develop strength, endurance, ROM and flexibility for 15 minutes including:    Nustep L4 x 10 min LE's only  Seated hamstring stretch 3/30 sec L/R  Standing gastroc stretch using 1/2 foam roll 3/30 sec L/R    Nirali participated in dynamic functional therapeutic activities to improve functional performance for  27 minutes, including:    HR/TR x 20 airex  Minisquats x 20 airex   Standing hip ABD 2 x 10  L/R airex  Marching x 20 reps airex  SLS 3/30 sec L/R with bilateral UE support  Tandem gait fwd/bwd 10 ft x 2 each  Step ups fwd x 10 leading L/R each, 4" step,  UE support throughout    Education provided:     - Continue with current home exercise program as instructed  - Discussed monitoring symptoms and stopping activities which cause increased pain      Written Home Exercises Provided: Patient instructed to cont prior HEP.  Exercises were reviewed and Nirali was able to demonstrate them prior to the end of the session.  Nirali demonstrated good  understanding of the education provided.      See EMR under Media for exercises provided " 10/5/2020.       Assessment     Nirali continues to rely on external support for balance and reactions to imbalances. Patient would benefit from continued NMR with regression of reliance on // bars and other external support to reduce risk of falls at home.     Pt prognosis is Good.     Pt will continue to benefit from skilled outpatient physical therapy to address the deficits listed in the problem list box on initial evaluation, provide pt/family education and to maximize pt's level of independence in the home and community environment.     Pt's spiritual, cultural and educational needs considered and pt agreeable to plan of care and goals.     Anticipated barriers to physical therapy:     Goals:    Short Term Goals:     1) Patient will improve LE strength by 1/2 muscle grade  2) Patient will initiate bwds gait training in // bars with close supervision/CGA   3) Patient will initiate step training in // bars to improve ability to navigate curbs/steps in community  Long Term Goals:   modified:    1) Patient will be independent in HEP  2) Patient will be able to perform sit to stand without UE support at SBA with good mechanics  3) Patient will improve functional outcome measure LEFS to <40% impairment   Plan      Continue with established Plan of Care towards PT goals. Progress strengthening/balance program per pt tolerance.

## 2020-12-28 ENCOUNTER — CLINICAL SUPPORT (OUTPATIENT)
Dept: REHABILITATION | Facility: HOSPITAL | Age: 55
End: 2020-12-28
Attending: PSYCHIATRY & NEUROLOGY
Payer: COMMERCIAL

## 2020-12-28 DIAGNOSIS — R26.81 GAIT INSTABILITY: ICD-10-CM

## 2020-12-28 PROCEDURE — 97110 THERAPEUTIC EXERCISES: CPT | Mod: PN

## 2020-12-28 NOTE — PROGRESS NOTES
Physical Therapy Daily Treatment Note     Time In: 0749  Time Out: 0828  Total Billable Time: 39 minutes    Name: Nirali Kirkpatrick  Clinic Number: [id number]    Therapy Diagnosis:   Encounter Diagnosis   Name Primary?    Gait instability      Physician: Ondina Polo MD    Visit Date: 12/28/2020    Physician Orders: PT Eval and Treat   Medical Diagnosis from Referral: R27.0 (ICD-10-CM) - Ataxia  Evaluation Date: 9/29/2020  Authorization Period Expiration: 12/31/2020  Plan of Care Certification Period: 12/4/2020 to 01/29/2021  Visit # / Visits authorized: 12/25        Precautions: falls, HTN    Subjective     Pt reports: no changes.  She was compliant with home exercise program.  Response to previous treatment: no complaints  Functional change: no    Pain: 0/10  Location:     Objective     Nirali received therapeutic exercises to develop strength, endurance, ROM and flexibility for 39 minutes including:        Nustep L4 x 10 min LE's only  Seated hamstring stretch 3/30 sec L/R  Standing gastroc stretch using 1/2 foam roll 3/30 sec L/R  Airex HR/TR x 20  Airex minisquats x 20  SLS 3/30 sec with B UE support  Standing marches x 10 L/R  4-inch fwd step ups x 10 leading L/R each, B UE support  Alternating toe taps to 4-inch step x 10 L/R  Side stepping 10 ft L/R  Fwd/Bwd tandem gait 10 ft x 2 each  Standing hamstring curls x 20 L/R          Education provided:     - Continue with current home exercise program as instructed  - Discussed monitoring symptoms and stopping activities which cause increased pain  - Confirmed date/time of next appointment    Written Home Exercises Provided: Patient instructed to cont prior HEP.  Exercises were reviewed and Nirali was able to demonstrate them prior to the end of the session.  Nirali demonstrated good  understanding of the education provided.      See EMR under Media for exercises provided 10/5/2020.    Assessment     Nirali is progressing well towards her goals.   Pt  prognosis is Good.     Pt will continue to benefit from skilled outpatient physical therapy to address the deficits listed in the problem list box on initial evaluation, provide pt/family education and to maximize pt's level of independence in the home and community environment.     Pt's spiritual, cultural and educational needs considered and pt agreeable to plan of care and goals.     Anticipated barriers to physical therapy:     Goals:  Short Term Goals: 3 weeks   1) Patient will initiate HEP  2) Patient will improve strength by 1/2 muscle grade     Long Term Goals: 6 weeks   1) Patient will be independent in HEP  2) Patient will be able to perform sit to stand without UE support at SBA  3) Patient will improve functional outcome measure LEFS to <40% impairment        Short Term Goal Status:  1) Met  2) Partially met/ongoing     Long Term Goal Status:  1) Met  2) Not met  3) Not assessed this date        Plan      Continue with established Plan of Care towards PT goals. Progress strengthening/balance program per pt tolerance.    Ban Lora, PT

## 2021-01-04 ENCOUNTER — CLINICAL SUPPORT (OUTPATIENT)
Dept: REHABILITATION | Facility: HOSPITAL | Age: 56
End: 2021-01-04
Attending: PSYCHIATRY & NEUROLOGY
Payer: COMMERCIAL

## 2021-01-04 DIAGNOSIS — R26.81 GAIT INSTABILITY: ICD-10-CM

## 2021-01-04 PROCEDURE — 97110 THERAPEUTIC EXERCISES: CPT | Mod: PN,CQ

## 2021-01-08 ENCOUNTER — CLINICAL SUPPORT (OUTPATIENT)
Dept: REHABILITATION | Facility: HOSPITAL | Age: 56
End: 2021-01-08
Attending: PSYCHIATRY & NEUROLOGY
Payer: COMMERCIAL

## 2021-01-08 DIAGNOSIS — R26.81 GAIT INSTABILITY: ICD-10-CM

## 2021-01-08 PROCEDURE — 97110 THERAPEUTIC EXERCISES: CPT | Mod: PN,CQ

## 2021-01-15 ENCOUNTER — CLINICAL SUPPORT (OUTPATIENT)
Dept: REHABILITATION | Facility: HOSPITAL | Age: 56
End: 2021-01-15
Attending: PSYCHIATRY & NEUROLOGY
Payer: COMMERCIAL

## 2021-01-15 DIAGNOSIS — R26.81 GAIT INSTABILITY: ICD-10-CM

## 2021-01-15 PROCEDURE — 97110 THERAPEUTIC EXERCISES: CPT | Mod: PN

## 2021-01-22 ENCOUNTER — CLINICAL SUPPORT (OUTPATIENT)
Dept: REHABILITATION | Facility: HOSPITAL | Age: 56
End: 2021-01-22
Attending: PSYCHIATRY & NEUROLOGY
Payer: COMMERCIAL

## 2021-01-22 DIAGNOSIS — R26.81 GAIT INSTABILITY: ICD-10-CM

## 2021-01-22 PROCEDURE — 97110 THERAPEUTIC EXERCISES: CPT | Mod: PN

## 2021-01-28 ENCOUNTER — OFFICE VISIT (OUTPATIENT)
Dept: NEUROLOGY | Facility: CLINIC | Age: 56
End: 2021-01-28
Payer: COMMERCIAL

## 2021-01-28 DIAGNOSIS — G47.52 REM SLEEP BEHAVIOR DISORDER: ICD-10-CM

## 2021-01-28 DIAGNOSIS — R27.0 ATAXIA: ICD-10-CM

## 2021-01-28 DIAGNOSIS — R06.81 APNEA: Primary | ICD-10-CM

## 2021-01-28 PROCEDURE — 99999 PR PBB SHADOW E&M-EST. PATIENT-LVL II: CPT | Mod: PBBFAC,,, | Performed by: PSYCHIATRY & NEUROLOGY

## 2021-01-28 PROCEDURE — 99215 PR OFFICE/OUTPT VISIT, EST, LEVL V, 40-54 MIN: ICD-10-PCS | Mod: S$GLB,,, | Performed by: PSYCHIATRY & NEUROLOGY

## 2021-01-28 PROCEDURE — 99999 PR PBB SHADOW E&M-EST. PATIENT-LVL II: ICD-10-PCS | Mod: PBBFAC,,, | Performed by: PSYCHIATRY & NEUROLOGY

## 2021-01-28 PROCEDURE — 99215 OFFICE O/P EST HI 40 MIN: CPT | Mod: S$GLB,,, | Performed by: PSYCHIATRY & NEUROLOGY

## 2021-01-28 RX ORDER — CLONAZEPAM 0.5 MG/1
0.5 TABLET ORAL 2 TIMES DAILY PRN
Qty: 30 TABLET | Refills: 5 | Status: SHIPPED | OUTPATIENT
Start: 2021-01-28 | End: 2021-08-02

## 2021-02-05 ENCOUNTER — PATIENT MESSAGE (OUTPATIENT)
Dept: NEUROLOGY | Facility: CLINIC | Age: 56
End: 2021-02-05

## 2021-02-24 ENCOUNTER — PATIENT MESSAGE (OUTPATIENT)
Dept: NEUROLOGY | Facility: CLINIC | Age: 56
End: 2021-02-24

## 2021-02-24 DIAGNOSIS — R27.0 ATAXIA: Primary | ICD-10-CM

## 2021-03-16 ENCOUNTER — HOSPITAL ENCOUNTER (OUTPATIENT)
Dept: RADIOLOGY | Facility: HOSPITAL | Age: 56
Discharge: HOME | End: 2021-03-16
Attending: PSYCHIATRY & NEUROLOGY
Payer: COMMERCIAL

## 2021-03-16 DIAGNOSIS — R27.0 ATAXIA: ICD-10-CM

## 2021-03-16 PROCEDURE — 70551 MRI BRAIN WITHOUT CONTRAST: ICD-10-PCS | Mod: 26,,, | Performed by: RADIOLOGY

## 2021-03-16 PROCEDURE — 70551 MRI BRAIN STEM W/O DYE: CPT | Mod: TC

## 2021-03-16 PROCEDURE — 70551 MRI BRAIN STEM W/O DYE: CPT | Mod: 26,,, | Performed by: RADIOLOGY

## 2021-03-17 ENCOUNTER — PATIENT MESSAGE (OUTPATIENT)
Dept: NEUROLOGY | Facility: CLINIC | Age: 56
End: 2021-03-17

## 2021-04-06 ENCOUNTER — PATIENT MESSAGE (OUTPATIENT)
Dept: NEUROLOGY | Facility: CLINIC | Age: 56
End: 2021-04-06

## 2021-04-12 ENCOUNTER — TELEPHONE (OUTPATIENT)
Dept: NEUROLOGY | Facility: CLINIC | Age: 56
End: 2021-04-12

## 2021-04-12 ENCOUNTER — OFFICE VISIT (OUTPATIENT)
Dept: NEUROLOGY | Facility: CLINIC | Age: 56
End: 2021-04-12
Payer: COMMERCIAL

## 2021-04-12 DIAGNOSIS — G47.52 REM SLEEP BEHAVIOR DISORDER: ICD-10-CM

## 2021-04-12 DIAGNOSIS — R32 URINARY INCONTINENCE, UNSPECIFIED TYPE: ICD-10-CM

## 2021-04-12 DIAGNOSIS — R27.0 ATAXIA: Primary | ICD-10-CM

## 2021-04-12 PROCEDURE — 99215 PR OFFICE/OUTPT VISIT, EST, LEVL V, 40-54 MIN: ICD-10-PCS | Mod: 95,,, | Performed by: PSYCHIATRY & NEUROLOGY

## 2021-04-12 PROCEDURE — 99215 OFFICE O/P EST HI 40 MIN: CPT | Mod: 95,,, | Performed by: PSYCHIATRY & NEUROLOGY

## 2021-04-12 RX ORDER — CARBIDOPA AND LEVODOPA 25; 100 MG/1; MG/1
1 TABLET ORAL 3 TIMES DAILY
Qty: 90 TABLET | Refills: 11 | Status: SHIPPED | OUTPATIENT
Start: 2021-04-12 | End: 2022-02-14

## 2021-04-19 ENCOUNTER — CLINICAL SUPPORT (OUTPATIENT)
Dept: REHABILITATION | Facility: HOSPITAL | Age: 56
End: 2021-04-19
Attending: PSYCHIATRY & NEUROLOGY
Payer: COMMERCIAL

## 2021-04-19 DIAGNOSIS — R53.1 DECREASED STRENGTH: ICD-10-CM

## 2021-04-19 DIAGNOSIS — R27.0 ATAXIA: ICD-10-CM

## 2021-04-19 DIAGNOSIS — Z91.81 RISK FOR FALLS: ICD-10-CM

## 2021-04-19 PROBLEM — R26.9 GAIT ABNORMALITY: Status: ACTIVE | Noted: 2020-09-30

## 2021-04-19 PROCEDURE — 97162 PT EVAL MOD COMPLEX 30 MIN: CPT | Mod: PN

## 2021-04-21 ENCOUNTER — TELEPHONE (OUTPATIENT)
Dept: UROLOGY | Facility: CLINIC | Age: 56
End: 2021-04-21

## 2021-04-22 ENCOUNTER — CLINICAL SUPPORT (OUTPATIENT)
Dept: REHABILITATION | Facility: HOSPITAL | Age: 56
End: 2021-04-22
Payer: COMMERCIAL

## 2021-04-22 DIAGNOSIS — R53.1 DECREASED STRENGTH: ICD-10-CM

## 2021-04-22 DIAGNOSIS — R26.9 GAIT ABNORMALITY: ICD-10-CM

## 2021-04-22 DIAGNOSIS — Z91.81 RISK FOR FALLS: ICD-10-CM

## 2021-04-22 PROCEDURE — 97110 THERAPEUTIC EXERCISES: CPT | Mod: PN

## 2021-04-26 ENCOUNTER — CLINICAL SUPPORT (OUTPATIENT)
Dept: REHABILITATION | Facility: HOSPITAL | Age: 56
End: 2021-04-26
Payer: COMMERCIAL

## 2021-04-26 DIAGNOSIS — R26.9 GAIT ABNORMALITY: ICD-10-CM

## 2021-04-26 DIAGNOSIS — R53.1 DECREASED STRENGTH: ICD-10-CM

## 2021-04-26 DIAGNOSIS — Z91.81 RISK FOR FALLS: ICD-10-CM

## 2021-04-26 PROCEDURE — 97110 THERAPEUTIC EXERCISES: CPT | Mod: PN

## 2021-04-27 ENCOUNTER — PATIENT MESSAGE (OUTPATIENT)
Dept: NEUROLOGY | Facility: CLINIC | Age: 56
End: 2021-04-27

## 2021-04-28 ENCOUNTER — TELEPHONE (OUTPATIENT)
Dept: SLEEP MEDICINE | Facility: CLINIC | Age: 56
End: 2021-04-28

## 2021-05-04 ENCOUNTER — CLINICAL SUPPORT (OUTPATIENT)
Dept: REHABILITATION | Facility: HOSPITAL | Age: 56
End: 2021-05-04
Payer: COMMERCIAL

## 2021-05-04 DIAGNOSIS — R53.1 DECREASED STRENGTH: ICD-10-CM

## 2021-05-04 DIAGNOSIS — R26.9 GAIT ABNORMALITY: ICD-10-CM

## 2021-05-04 DIAGNOSIS — Z91.81 RISK FOR FALLS: ICD-10-CM

## 2021-05-04 PROCEDURE — 97116 GAIT TRAINING THERAPY: CPT | Mod: PN,CQ

## 2021-05-04 PROCEDURE — 97110 THERAPEUTIC EXERCISES: CPT | Mod: PN,CQ

## 2021-05-04 PROCEDURE — 97112 NEUROMUSCULAR REEDUCATION: CPT | Mod: PN,CQ

## 2021-05-06 ENCOUNTER — CLINICAL SUPPORT (OUTPATIENT)
Dept: REHABILITATION | Facility: HOSPITAL | Age: 56
End: 2021-05-06
Payer: COMMERCIAL

## 2021-05-06 DIAGNOSIS — R53.1 DECREASED STRENGTH: ICD-10-CM

## 2021-05-06 DIAGNOSIS — Z91.81 RISK FOR FALLS: ICD-10-CM

## 2021-05-06 DIAGNOSIS — R26.9 GAIT ABNORMALITY: ICD-10-CM

## 2021-05-06 PROCEDURE — 97116 GAIT TRAINING THERAPY: CPT | Mod: PN,CQ

## 2021-05-06 PROCEDURE — 97112 NEUROMUSCULAR REEDUCATION: CPT | Mod: PN,CQ

## 2021-05-06 PROCEDURE — 97110 THERAPEUTIC EXERCISES: CPT | Mod: PN,CQ

## 2021-05-11 ENCOUNTER — CLINICAL SUPPORT (OUTPATIENT)
Dept: REHABILITATION | Facility: HOSPITAL | Age: 56
End: 2021-05-11
Payer: COMMERCIAL

## 2021-05-11 DIAGNOSIS — R53.1 DECREASED STRENGTH: ICD-10-CM

## 2021-05-11 DIAGNOSIS — R26.9 GAIT ABNORMALITY: ICD-10-CM

## 2021-05-11 DIAGNOSIS — Z91.81 RISK FOR FALLS: ICD-10-CM

## 2021-05-11 PROCEDURE — 97116 GAIT TRAINING THERAPY: CPT | Mod: PN,CQ

## 2021-05-11 PROCEDURE — 97112 NEUROMUSCULAR REEDUCATION: CPT | Mod: PN,CQ

## 2021-05-11 PROCEDURE — 97110 THERAPEUTIC EXERCISES: CPT | Mod: PN,CQ

## 2021-05-12 ENCOUNTER — PATIENT MESSAGE (OUTPATIENT)
Dept: RESEARCH | Facility: HOSPITAL | Age: 56
End: 2021-05-12

## 2021-05-12 ENCOUNTER — DOCUMENTATION ONLY (OUTPATIENT)
Dept: REHABILITATION | Facility: HOSPITAL | Age: 56
End: 2021-05-12

## 2021-05-13 ENCOUNTER — CLINICAL SUPPORT (OUTPATIENT)
Dept: REHABILITATION | Facility: HOSPITAL | Age: 56
End: 2021-05-13
Payer: COMMERCIAL

## 2021-05-13 ENCOUNTER — PATIENT MESSAGE (OUTPATIENT)
Dept: NEUROLOGY | Facility: CLINIC | Age: 56
End: 2021-05-13

## 2021-05-13 DIAGNOSIS — R53.1 DECREASED STRENGTH: ICD-10-CM

## 2021-05-13 DIAGNOSIS — Z91.81 RISK FOR FALLS: ICD-10-CM

## 2021-05-13 DIAGNOSIS — R26.9 GAIT ABNORMALITY: ICD-10-CM

## 2021-05-13 PROCEDURE — 97110 THERAPEUTIC EXERCISES: CPT | Mod: PN

## 2021-05-17 ENCOUNTER — OFFICE VISIT (OUTPATIENT)
Dept: UROLOGY | Facility: CLINIC | Age: 56
End: 2021-05-17
Payer: COMMERCIAL

## 2021-05-17 VITALS
WEIGHT: 211.63 LBS | HEIGHT: 67 IN | DIASTOLIC BLOOD PRESSURE: 83 MMHG | HEART RATE: 85 BPM | SYSTOLIC BLOOD PRESSURE: 120 MMHG | BODY MASS INDEX: 33.21 KG/M2

## 2021-05-17 DIAGNOSIS — R27.0 ATAXIA: ICD-10-CM

## 2021-05-17 DIAGNOSIS — N39.46 MIXED INCONTINENCE: Primary | ICD-10-CM

## 2021-05-17 PROCEDURE — 87086 URINE CULTURE/COLONY COUNT: CPT | Performed by: UROLOGY

## 2021-05-17 PROCEDURE — 3008F BODY MASS INDEX DOCD: CPT | Mod: CPTII,S$GLB,, | Performed by: UROLOGY

## 2021-05-17 PROCEDURE — 81002 PR URINALYSIS NONAUTO W/O SCOPE: ICD-10-PCS | Mod: S$GLB,,, | Performed by: UROLOGY

## 2021-05-17 PROCEDURE — 81002 URINALYSIS NONAUTO W/O SCOPE: CPT | Mod: S$GLB,,, | Performed by: UROLOGY

## 2021-05-17 PROCEDURE — 99204 PR OFFICE/OUTPT VISIT, NEW, LEVL IV, 45-59 MIN: ICD-10-PCS | Mod: 25,S$GLB,, | Performed by: UROLOGY

## 2021-05-17 PROCEDURE — 1126F AMNT PAIN NOTED NONE PRSNT: CPT | Mod: S$GLB,,, | Performed by: UROLOGY

## 2021-05-17 PROCEDURE — 3008F PR BODY MASS INDEX (BMI) DOCUMENTED: ICD-10-PCS | Mod: CPTII,S$GLB,, | Performed by: UROLOGY

## 2021-05-17 PROCEDURE — 1126F PR PAIN SEVERITY QUANTIFIED, NO PAIN PRESENT: ICD-10-PCS | Mod: S$GLB,,, | Performed by: UROLOGY

## 2021-05-17 PROCEDURE — 99204 OFFICE O/P NEW MOD 45 MIN: CPT | Mod: 25,S$GLB,, | Performed by: UROLOGY

## 2021-05-17 PROCEDURE — 99999 PR PBB SHADOW E&M-EST. PATIENT-LVL IV: CPT | Mod: PBBFAC,,, | Performed by: UROLOGY

## 2021-05-17 PROCEDURE — 99999 PR PBB SHADOW E&M-EST. PATIENT-LVL IV: ICD-10-PCS | Mod: PBBFAC,,, | Performed by: UROLOGY

## 2021-05-17 RX ORDER — TOLTERODINE 4 MG/1
4 CAPSULE, EXTENDED RELEASE ORAL DAILY
Qty: 30 CAPSULE | Refills: 11 | Status: SHIPPED | OUTPATIENT
Start: 2021-05-17 | End: 2022-08-10 | Stop reason: SDUPTHER

## 2021-05-18 ENCOUNTER — CLINICAL SUPPORT (OUTPATIENT)
Dept: REHABILITATION | Facility: HOSPITAL | Age: 56
End: 2021-05-18
Payer: COMMERCIAL

## 2021-05-18 ENCOUNTER — TELEPHONE (OUTPATIENT)
Dept: UROLOGY | Facility: CLINIC | Age: 56
End: 2021-05-18

## 2021-05-18 DIAGNOSIS — Z91.81 RISK FOR FALLS: ICD-10-CM

## 2021-05-18 DIAGNOSIS — R53.1 DECREASED STRENGTH: ICD-10-CM

## 2021-05-18 DIAGNOSIS — N31.9 NEUROGENIC BLADDER: ICD-10-CM

## 2021-05-18 DIAGNOSIS — N39.46 MIXED INCONTINENCE: Primary | ICD-10-CM

## 2021-05-18 DIAGNOSIS — R26.9 GAIT ABNORMALITY: ICD-10-CM

## 2021-05-18 LAB — BACTERIA UR CULT: NO GROWTH

## 2021-05-18 PROCEDURE — 97112 NEUROMUSCULAR REEDUCATION: CPT | Mod: PN

## 2021-05-18 PROCEDURE — 97110 THERAPEUTIC EXERCISES: CPT | Mod: PN

## 2021-05-18 PROCEDURE — 97116 GAIT TRAINING THERAPY: CPT | Mod: PN

## 2021-05-19 ENCOUNTER — PATIENT MESSAGE (OUTPATIENT)
Dept: UROLOGY | Facility: CLINIC | Age: 56
End: 2021-05-19

## 2021-05-25 ENCOUNTER — CLINICAL SUPPORT (OUTPATIENT)
Dept: REHABILITATION | Facility: HOSPITAL | Age: 56
End: 2021-05-25
Payer: COMMERCIAL

## 2021-05-25 DIAGNOSIS — R26.9 GAIT ABNORMALITY: ICD-10-CM

## 2021-05-25 DIAGNOSIS — R53.1 DECREASED STRENGTH: ICD-10-CM

## 2021-05-25 DIAGNOSIS — Z91.81 RISK FOR FALLS: ICD-10-CM

## 2021-05-25 PROCEDURE — 97112 NEUROMUSCULAR REEDUCATION: CPT | Mod: PN,CQ

## 2021-05-25 PROCEDURE — 97116 GAIT TRAINING THERAPY: CPT | Mod: PN,CQ

## 2021-05-25 PROCEDURE — 97110 THERAPEUTIC EXERCISES: CPT | Mod: PN,CQ

## 2021-05-27 ENCOUNTER — CLINICAL SUPPORT (OUTPATIENT)
Dept: REHABILITATION | Facility: HOSPITAL | Age: 56
End: 2021-05-27
Payer: COMMERCIAL

## 2021-05-27 DIAGNOSIS — R53.1 DECREASED STRENGTH: ICD-10-CM

## 2021-05-27 DIAGNOSIS — Z91.81 RISK FOR FALLS: ICD-10-CM

## 2021-05-27 DIAGNOSIS — R26.9 GAIT ABNORMALITY: ICD-10-CM

## 2021-05-27 PROCEDURE — 97112 NEUROMUSCULAR REEDUCATION: CPT | Mod: PN,CQ

## 2021-05-27 PROCEDURE — 97116 GAIT TRAINING THERAPY: CPT | Mod: PN,CQ

## 2021-05-27 PROCEDURE — 97110 THERAPEUTIC EXERCISES: CPT | Mod: PN,CQ

## 2021-06-01 ENCOUNTER — CLINICAL SUPPORT (OUTPATIENT)
Dept: REHABILITATION | Facility: HOSPITAL | Age: 56
End: 2021-06-01
Payer: COMMERCIAL

## 2021-06-01 DIAGNOSIS — R53.1 DECREASED STRENGTH: ICD-10-CM

## 2021-06-01 DIAGNOSIS — Z91.81 RISK FOR FALLS: ICD-10-CM

## 2021-06-01 DIAGNOSIS — R26.9 GAIT ABNORMALITY: ICD-10-CM

## 2021-06-01 PROCEDURE — 97110 THERAPEUTIC EXERCISES: CPT | Mod: PN

## 2021-06-02 ENCOUNTER — DOCUMENTATION ONLY (OUTPATIENT)
Dept: REHABILITATION | Facility: HOSPITAL | Age: 56
End: 2021-06-02

## 2021-06-03 ENCOUNTER — OFFICE VISIT (OUTPATIENT)
Dept: SLEEP MEDICINE | Facility: CLINIC | Age: 56
End: 2021-06-03
Payer: COMMERCIAL

## 2021-06-03 ENCOUNTER — CLINICAL SUPPORT (OUTPATIENT)
Dept: REHABILITATION | Facility: HOSPITAL | Age: 56
End: 2021-06-03
Payer: COMMERCIAL

## 2021-06-03 VITALS
HEIGHT: 67 IN | SYSTOLIC BLOOD PRESSURE: 108 MMHG | HEART RATE: 98 BPM | WEIGHT: 211.63 LBS | BODY MASS INDEX: 33.21 KG/M2 | DIASTOLIC BLOOD PRESSURE: 70 MMHG

## 2021-06-03 DIAGNOSIS — Z91.81 RISK FOR FALLS: ICD-10-CM

## 2021-06-03 DIAGNOSIS — R53.1 DECREASED STRENGTH: ICD-10-CM

## 2021-06-03 DIAGNOSIS — G47.52 DREAM ENACTMENT BEHAVIOR: Primary | ICD-10-CM

## 2021-06-03 DIAGNOSIS — R27.0 ATAXIA: ICD-10-CM

## 2021-06-03 DIAGNOSIS — R26.9 GAIT ABNORMALITY: ICD-10-CM

## 2021-06-03 DIAGNOSIS — R06.83 SNORING: ICD-10-CM

## 2021-06-03 PROCEDURE — 99999 PR PBB SHADOW E&M-EST. PATIENT-LVL III: ICD-10-PCS | Mod: PBBFAC,,, | Performed by: INTERNAL MEDICINE

## 2021-06-03 PROCEDURE — 1126F AMNT PAIN NOTED NONE PRSNT: CPT | Mod: S$GLB,,, | Performed by: INTERNAL MEDICINE

## 2021-06-03 PROCEDURE — 97112 NEUROMUSCULAR REEDUCATION: CPT | Mod: PN,CQ

## 2021-06-03 PROCEDURE — 99203 OFFICE O/P NEW LOW 30 MIN: CPT | Mod: S$GLB,,, | Performed by: INTERNAL MEDICINE

## 2021-06-03 PROCEDURE — 99203 PR OFFICE/OUTPT VISIT, NEW, LEVL III, 30-44 MIN: ICD-10-PCS | Mod: S$GLB,,, | Performed by: INTERNAL MEDICINE

## 2021-06-03 PROCEDURE — 3008F BODY MASS INDEX DOCD: CPT | Mod: CPTII,S$GLB,, | Performed by: INTERNAL MEDICINE

## 2021-06-03 PROCEDURE — 99999 PR PBB SHADOW E&M-EST. PATIENT-LVL III: CPT | Mod: PBBFAC,,, | Performed by: INTERNAL MEDICINE

## 2021-06-03 PROCEDURE — 3008F PR BODY MASS INDEX (BMI) DOCUMENTED: ICD-10-PCS | Mod: CPTII,S$GLB,, | Performed by: INTERNAL MEDICINE

## 2021-06-03 PROCEDURE — 97110 THERAPEUTIC EXERCISES: CPT | Mod: PN,CQ

## 2021-06-03 PROCEDURE — 97116 GAIT TRAINING THERAPY: CPT | Mod: PN,CQ

## 2021-06-03 PROCEDURE — 1126F PR PAIN SEVERITY QUANTIFIED, NO PAIN PRESENT: ICD-10-PCS | Mod: S$GLB,,, | Performed by: INTERNAL MEDICINE

## 2021-06-08 ENCOUNTER — CLINICAL SUPPORT (OUTPATIENT)
Dept: REHABILITATION | Facility: HOSPITAL | Age: 56
End: 2021-06-08
Payer: COMMERCIAL

## 2021-06-08 DIAGNOSIS — R26.9 GAIT ABNORMALITY: ICD-10-CM

## 2021-06-08 DIAGNOSIS — Z91.81 RISK FOR FALLS: ICD-10-CM

## 2021-06-08 DIAGNOSIS — R53.1 DECREASED STRENGTH: ICD-10-CM

## 2021-06-08 PROCEDURE — 97116 GAIT TRAINING THERAPY: CPT | Mod: PN,CQ

## 2021-06-08 PROCEDURE — 97112 NEUROMUSCULAR REEDUCATION: CPT | Mod: PN,CQ

## 2021-06-08 PROCEDURE — 97110 THERAPEUTIC EXERCISES: CPT | Mod: PN,CQ

## 2021-06-10 ENCOUNTER — PATIENT MESSAGE (OUTPATIENT)
Dept: NEUROLOGY | Facility: CLINIC | Age: 56
End: 2021-06-10

## 2021-06-11 ENCOUNTER — TELEPHONE (OUTPATIENT)
Dept: SLEEP MEDICINE | Facility: OTHER | Age: 56
End: 2021-06-11

## 2021-06-14 ENCOUNTER — CLINICAL SUPPORT (OUTPATIENT)
Dept: REHABILITATION | Facility: HOSPITAL | Age: 56
End: 2021-06-14
Payer: COMMERCIAL

## 2021-06-14 ENCOUNTER — TELEPHONE (OUTPATIENT)
Dept: UROLOGY | Facility: CLINIC | Age: 56
End: 2021-06-14

## 2021-06-14 DIAGNOSIS — R53.1 DECREASED STRENGTH: ICD-10-CM

## 2021-06-14 DIAGNOSIS — R26.9 GAIT ABNORMALITY: ICD-10-CM

## 2021-06-14 DIAGNOSIS — Z91.81 RISK FOR FALLS: ICD-10-CM

## 2021-06-14 PROCEDURE — 97110 THERAPEUTIC EXERCISES: CPT | Mod: PN

## 2021-06-14 PROCEDURE — 97530 THERAPEUTIC ACTIVITIES: CPT | Mod: PN

## 2021-06-15 ENCOUNTER — HOSPITAL ENCOUNTER (OUTPATIENT)
Facility: HOSPITAL | Age: 56
Discharge: HOME | End: 2021-06-15
Attending: UROLOGY | Admitting: UROLOGY
Payer: COMMERCIAL

## 2021-06-15 VITALS
WEIGHT: 210 LBS | SYSTOLIC BLOOD PRESSURE: 107 MMHG | DIASTOLIC BLOOD PRESSURE: 75 MMHG | OXYGEN SATURATION: 100 % | RESPIRATION RATE: 18 BRPM | TEMPERATURE: 98 F | HEART RATE: 85 BPM | BODY MASS INDEX: 32.96 KG/M2 | HEIGHT: 67 IN

## 2021-06-15 DIAGNOSIS — N32.9 BLADDER DISORDER: ICD-10-CM

## 2021-06-15 PROCEDURE — 51600 INJECTION FOR BLADDER X-RAY: CPT | Mod: 51,,, | Performed by: UROLOGY

## 2021-06-15 PROCEDURE — 74455 X-RAY URETHRA/BLADDER: CPT | Mod: 26,,, | Performed by: UROLOGY

## 2021-06-15 PROCEDURE — 27201008 HC FLEXSCOPE: Performed by: UROLOGY

## 2021-06-15 PROCEDURE — 51741 PR UROFLOWMETRY, COMPLEX: ICD-10-PCS | Mod: 26,51,, | Performed by: UROLOGY

## 2021-06-15 PROCEDURE — 51784 PR ANAL/URINARY MUSCLE STUDY: ICD-10-PCS | Mod: 26,51,, | Performed by: UROLOGY

## 2021-06-15 PROCEDURE — 51728 CYSTOMETROGRAM W/VP: CPT | Mod: 26,,, | Performed by: UROLOGY

## 2021-06-15 PROCEDURE — 51741 ELECTRO-UROFLOWMETRY FIRST: CPT | Mod: 26,51,, | Performed by: UROLOGY

## 2021-06-15 PROCEDURE — 36000706: Performed by: UROLOGY

## 2021-06-15 PROCEDURE — 51784 ANAL/URINARY MUSCLE STUDY: CPT | Mod: 26,51,, | Performed by: UROLOGY

## 2021-06-15 PROCEDURE — 52000 CYSTOURETHROSCOPY: CPT | Mod: 59,,, | Performed by: UROLOGY

## 2021-06-15 PROCEDURE — 51600 PR INJECTION FOR BLADDER X-RAY: ICD-10-PCS | Mod: 51,,, | Performed by: UROLOGY

## 2021-06-15 PROCEDURE — 36000707: Performed by: UROLOGY

## 2021-06-15 PROCEDURE — 51728 PR COMPLEX CYSTOMETROGRAM VOIDING PRESSURE STUDIES: ICD-10-PCS | Mod: 26,,, | Performed by: UROLOGY

## 2021-06-15 PROCEDURE — 52000 PR CYSTOURETHROSCOPY: ICD-10-PCS | Mod: 59,,, | Performed by: UROLOGY

## 2021-06-15 PROCEDURE — 27200971 HC CYSTO SUPPLY II (SCOPE PRCDR.): Performed by: UROLOGY

## 2021-06-15 PROCEDURE — 74455 PR X-RAY URETHROCYSTOGRAM+VOIDING: ICD-10-PCS | Mod: 26,,, | Performed by: UROLOGY

## 2021-06-15 PROCEDURE — 27200973 HC CYSTO SUPPLY IV (URODYNAMICS): Performed by: UROLOGY

## 2021-06-15 PROCEDURE — 51797 PR VOIDING PRESS STUDY INTRA-ABDOMINAL VOID: ICD-10-PCS | Mod: 26,,, | Performed by: UROLOGY

## 2021-06-15 PROCEDURE — 51797 INTRAABDOMINAL PRESSURE TEST: CPT | Mod: 26,,, | Performed by: UROLOGY

## 2021-06-21 ENCOUNTER — OFFICE VISIT (OUTPATIENT)
Dept: NEUROLOGY | Facility: CLINIC | Age: 56
End: 2021-06-21
Payer: COMMERCIAL

## 2021-06-21 DIAGNOSIS — G47.52 REM SLEEP BEHAVIOR DISORDER: ICD-10-CM

## 2021-06-21 DIAGNOSIS — G23.8 MULTIPLE SYSTEM ATROPHY: ICD-10-CM

## 2021-06-21 DIAGNOSIS — G90.3 MULTIPLE SYSTEM ATROPHY: ICD-10-CM

## 2021-06-21 DIAGNOSIS — G20.C PARKINSONISM, UNSPECIFIED PARKINSONISM TYPE: ICD-10-CM

## 2021-06-21 PROCEDURE — 99215 OFFICE O/P EST HI 40 MIN: CPT | Mod: 95,,, | Performed by: PSYCHIATRY & NEUROLOGY

## 2021-06-21 PROCEDURE — 99215 PR OFFICE/OUTPT VISIT, EST, LEVL V, 40-54 MIN: ICD-10-PCS | Mod: 95,,, | Performed by: PSYCHIATRY & NEUROLOGY

## 2021-06-22 ENCOUNTER — TELEPHONE (OUTPATIENT)
Dept: UROLOGY | Facility: CLINIC | Age: 56
End: 2021-06-22

## 2021-06-23 ENCOUNTER — PATIENT MESSAGE (OUTPATIENT)
Dept: UROLOGY | Facility: CLINIC | Age: 56
End: 2021-06-23

## 2021-06-23 ENCOUNTER — CLINICAL SUPPORT (OUTPATIENT)
Dept: REHABILITATION | Facility: HOSPITAL | Age: 56
End: 2021-06-23
Payer: COMMERCIAL

## 2021-06-23 ENCOUNTER — TELEPHONE (OUTPATIENT)
Dept: UROLOGY | Facility: CLINIC | Age: 56
End: 2021-06-23

## 2021-06-23 DIAGNOSIS — N39.3 STRESS INCONTINENCE IN FEMALE: Primary | ICD-10-CM

## 2021-06-23 DIAGNOSIS — Z91.81 RISK FOR FALLS: ICD-10-CM

## 2021-06-23 DIAGNOSIS — R53.1 DECREASED STRENGTH: ICD-10-CM

## 2021-06-23 DIAGNOSIS — R26.9 GAIT ABNORMALITY: ICD-10-CM

## 2021-06-23 PROCEDURE — 97112 NEUROMUSCULAR REEDUCATION: CPT | Mod: PN,CQ

## 2021-06-23 PROCEDURE — 97116 GAIT TRAINING THERAPY: CPT | Mod: PN,CQ

## 2021-06-23 PROCEDURE — 97110 THERAPEUTIC EXERCISES: CPT | Mod: PN,CQ

## 2021-06-24 ENCOUNTER — TELEPHONE (OUTPATIENT)
Dept: SLEEP MEDICINE | Facility: OTHER | Age: 56
End: 2021-06-24

## 2021-06-28 ENCOUNTER — PATIENT MESSAGE (OUTPATIENT)
Dept: NEUROLOGY | Facility: CLINIC | Age: 56
End: 2021-06-28

## 2021-06-29 ENCOUNTER — PATIENT MESSAGE (OUTPATIENT)
Dept: SURGERY | Facility: HOSPITAL | Age: 56
End: 2021-06-29

## 2021-06-29 ENCOUNTER — CLINICAL SUPPORT (OUTPATIENT)
Dept: REHABILITATION | Facility: HOSPITAL | Age: 56
End: 2021-06-29
Payer: COMMERCIAL

## 2021-06-29 ENCOUNTER — PATIENT MESSAGE (OUTPATIENT)
Dept: NEUROLOGY | Facility: CLINIC | Age: 56
End: 2021-06-29

## 2021-06-29 DIAGNOSIS — R53.1 DECREASED STRENGTH: ICD-10-CM

## 2021-06-29 DIAGNOSIS — R26.9 GAIT ABNORMALITY: ICD-10-CM

## 2021-06-29 DIAGNOSIS — Z91.81 RISK FOR FALLS: ICD-10-CM

## 2021-06-29 PROCEDURE — 97110 THERAPEUTIC EXERCISES: CPT | Mod: PN

## 2021-06-29 PROCEDURE — 97112 NEUROMUSCULAR REEDUCATION: CPT | Mod: PN

## 2021-07-07 ENCOUNTER — CLINICAL SUPPORT (OUTPATIENT)
Dept: REHABILITATION | Facility: HOSPITAL | Age: 56
End: 2021-07-07
Attending: PSYCHIATRY & NEUROLOGY
Payer: COMMERCIAL

## 2021-07-07 DIAGNOSIS — Z91.81 RISK FOR FALLS: ICD-10-CM

## 2021-07-07 DIAGNOSIS — R26.9 GAIT ABNORMALITY: ICD-10-CM

## 2021-07-07 DIAGNOSIS — R53.1 DECREASED STRENGTH: ICD-10-CM

## 2021-07-07 PROCEDURE — 97110 THERAPEUTIC EXERCISES: CPT | Mod: PN

## 2021-07-07 PROCEDURE — 97530 THERAPEUTIC ACTIVITIES: CPT | Mod: PN

## 2021-07-12 ENCOUNTER — ANESTHESIA EVENT (OUTPATIENT)
Dept: SURGERY | Facility: HOSPITAL | Age: 56
End: 2021-07-12
Payer: COMMERCIAL

## 2021-07-12 ENCOUNTER — TELEPHONE (OUTPATIENT)
Dept: UROLOGY | Facility: CLINIC | Age: 56
End: 2021-07-12

## 2021-07-13 ENCOUNTER — ANESTHESIA (OUTPATIENT)
Dept: SURGERY | Facility: HOSPITAL | Age: 56
End: 2021-07-13
Payer: COMMERCIAL

## 2021-07-13 ENCOUNTER — HOSPITAL ENCOUNTER (OUTPATIENT)
Facility: HOSPITAL | Age: 56
Discharge: HOME | End: 2021-07-14
Attending: UROLOGY | Admitting: UROLOGY
Payer: COMMERCIAL

## 2021-07-13 DIAGNOSIS — N39.3 SUI (STRESS URINARY INCONTINENCE, FEMALE): ICD-10-CM

## 2021-07-13 LAB — SARS-COV-2 RDRP RESP QL NAA+PROBE: NEGATIVE

## 2021-07-13 PROCEDURE — 71000016 HC POSTOP RECOV ADDL HR: Performed by: UROLOGY

## 2021-07-13 PROCEDURE — 25000003 PHARM REV CODE 250: Performed by: UROLOGY

## 2021-07-13 PROCEDURE — 25000003 PHARM REV CODE 250: Performed by: STUDENT IN AN ORGANIZED HEALTH CARE EDUCATION/TRAINING PROGRAM

## 2021-07-13 PROCEDURE — 71000033 HC RECOVERY, INTIAL HOUR: Performed by: UROLOGY

## 2021-07-13 PROCEDURE — 71000039 HC RECOVERY, EACH ADD'L HOUR: Performed by: UROLOGY

## 2021-07-13 PROCEDURE — C1771 REP DEV, URINARY, W/SLING: HCPCS | Performed by: UROLOGY

## 2021-07-13 PROCEDURE — D9220A PRA ANESTHESIA: Mod: GC,,, | Performed by: ANESTHESIOLOGY

## 2021-07-13 PROCEDURE — G0378 HOSPITAL OBSERVATION PER HR: HCPCS

## 2021-07-13 PROCEDURE — 57288 REPAIR BLADDER DEFECT: CPT | Mod: ,,, | Performed by: UROLOGY

## 2021-07-13 PROCEDURE — 94761 N-INVAS EAR/PLS OXIMETRY MLT: CPT

## 2021-07-13 PROCEDURE — 36000707: Performed by: UROLOGY

## 2021-07-13 PROCEDURE — 36000706: Performed by: UROLOGY

## 2021-07-13 PROCEDURE — 71000015 HC POSTOP RECOV 1ST HR: Performed by: UROLOGY

## 2021-07-13 PROCEDURE — 63600175 PHARM REV CODE 636 W HCPCS: Performed by: STUDENT IN AN ORGANIZED HEALTH CARE EDUCATION/TRAINING PROGRAM

## 2021-07-13 PROCEDURE — D9220A PRA ANESTHESIA: ICD-10-PCS | Mod: GC,,, | Performed by: ANESTHESIOLOGY

## 2021-07-13 PROCEDURE — 57288 PR SLING OPER STRES INCONTINENCE: ICD-10-PCS | Mod: ,,, | Performed by: UROLOGY

## 2021-07-13 PROCEDURE — 94799 UNLISTED PULMONARY SVC/PX: CPT

## 2021-07-13 PROCEDURE — U0002 COVID-19 LAB TEST NON-CDC: HCPCS | Performed by: UROLOGY

## 2021-07-13 PROCEDURE — 37000009 HC ANESTHESIA EA ADD 15 MINS: Performed by: UROLOGY

## 2021-07-13 PROCEDURE — 27201423 OPTIME MED/SURG SUP & DEVICES STERILE SUPPLY: Performed by: UROLOGY

## 2021-07-13 PROCEDURE — 37000008 HC ANESTHESIA 1ST 15 MINUTES: Performed by: UROLOGY

## 2021-07-13 DEVICE — SLING HALO SHAPE OBTRYX II: Type: IMPLANTABLE DEVICE | Site: VAGINA | Status: FUNCTIONAL

## 2021-07-13 RX ORDER — CLONAZEPAM 0.5 MG/1
0.5 TABLET ORAL 2 TIMES DAILY PRN
Status: DISCONTINUED | OUTPATIENT
Start: 2021-07-13 | End: 2021-07-13

## 2021-07-13 RX ORDER — SODIUM CHLORIDE 0.9 % (FLUSH) 0.9 %
10 SYRINGE (ML) INJECTION
Status: DISCONTINUED | OUTPATIENT
Start: 2021-07-13 | End: 2021-07-14 | Stop reason: HOSPADM

## 2021-07-13 RX ORDER — DESVENLAFAXINE 100 MG/1
100 TABLET, EXTENDED RELEASE ORAL NIGHTLY
Status: DISCONTINUED | OUTPATIENT
Start: 2021-07-13 | End: 2021-07-14 | Stop reason: HOSPADM

## 2021-07-13 RX ORDER — DESVENLAFAXINE 100 MG/1
100 TABLET, EXTENDED RELEASE ORAL DAILY
Status: DISCONTINUED | OUTPATIENT
Start: 2021-07-13 | End: 2021-07-13

## 2021-07-13 RX ORDER — MIDAZOLAM HYDROCHLORIDE 1 MG/ML
INJECTION, SOLUTION INTRAMUSCULAR; INTRAVENOUS
Status: DISCONTINUED | OUTPATIENT
Start: 2021-07-13 | End: 2021-07-13

## 2021-07-13 RX ORDER — BUPIVACAINE HYDROCHLORIDE 2.5 MG/ML
INJECTION, SOLUTION EPIDURAL; INFILTRATION; INTRACAUDAL
Status: DISCONTINUED | OUTPATIENT
Start: 2021-07-13 | End: 2021-07-13 | Stop reason: HOSPADM

## 2021-07-13 RX ORDER — ACETAMINOPHEN 325 MG/1
650 TABLET ORAL EVERY 6 HOURS
Status: DISCONTINUED | OUTPATIENT
Start: 2021-07-13 | End: 2021-07-14 | Stop reason: HOSPADM

## 2021-07-13 RX ORDER — LIDOCAINE HYDROCHLORIDE 10 MG/ML
INJECTION, SOLUTION EPIDURAL; INFILTRATION; INTRACAUDAL; PERINEURAL
Status: DISCONTINUED | OUTPATIENT
Start: 2021-07-13 | End: 2021-07-13 | Stop reason: HOSPADM

## 2021-07-13 RX ORDER — SODIUM CHLORIDE 9 MG/ML
INJECTION, SOLUTION INTRAVENOUS CONTINUOUS
Status: DISCONTINUED | OUTPATIENT
Start: 2021-07-13 | End: 2021-07-14 | Stop reason: HOSPADM

## 2021-07-13 RX ORDER — ONDANSETRON 8 MG/1
8 TABLET, ORALLY DISINTEGRATING ORAL EVERY 8 HOURS PRN
Status: DISCONTINUED | OUTPATIENT
Start: 2021-07-13 | End: 2021-07-14 | Stop reason: HOSPADM

## 2021-07-13 RX ORDER — HYDROMORPHONE HYDROCHLORIDE 1 MG/ML
0.2 INJECTION, SOLUTION INTRAMUSCULAR; INTRAVENOUS; SUBCUTANEOUS EVERY 5 MIN PRN
Status: DISCONTINUED | OUTPATIENT
Start: 2021-07-13 | End: 2021-07-13 | Stop reason: HOSPADM

## 2021-07-13 RX ORDER — FENTANYL CITRATE 50 UG/ML
25 INJECTION, SOLUTION INTRAMUSCULAR; INTRAVENOUS EVERY 5 MIN PRN
Status: DISCONTINUED | OUTPATIENT
Start: 2021-07-13 | End: 2021-07-13 | Stop reason: HOSPADM

## 2021-07-13 RX ORDER — AMLODIPINE BESYLATE 5 MG/1
5 TABLET ORAL NIGHTLY
Status: DISCONTINUED | OUTPATIENT
Start: 2021-07-13 | End: 2021-07-14 | Stop reason: HOSPADM

## 2021-07-13 RX ORDER — DIPHENHYDRAMINE HYDROCHLORIDE 50 MG/ML
25 INJECTION INTRAMUSCULAR; INTRAVENOUS EVERY 6 HOURS PRN
Status: DISCONTINUED | OUTPATIENT
Start: 2021-07-13 | End: 2021-07-13 | Stop reason: HOSPADM

## 2021-07-13 RX ORDER — CARBIDOPA AND LEVODOPA 25; 100 MG/1; MG/1
1 TABLET ORAL 3 TIMES DAILY
Status: DISCONTINUED | OUTPATIENT
Start: 2021-07-13 | End: 2021-07-14 | Stop reason: HOSPADM

## 2021-07-13 RX ORDER — PROPOFOL 10 MG/ML
VIAL (ML) INTRAVENOUS
Status: DISCONTINUED | OUTPATIENT
Start: 2021-07-13 | End: 2021-07-13

## 2021-07-13 RX ORDER — ONDANSETRON 2 MG/ML
INJECTION INTRAMUSCULAR; INTRAVENOUS
Status: DISCONTINUED | OUTPATIENT
Start: 2021-07-13 | End: 2021-07-13

## 2021-07-13 RX ORDER — ONDANSETRON 2 MG/ML
4 INJECTION INTRAMUSCULAR; INTRAVENOUS ONCE AS NEEDED
Status: DISCONTINUED | OUTPATIENT
Start: 2021-07-13 | End: 2021-07-13 | Stop reason: HOSPADM

## 2021-07-13 RX ORDER — CEFAZOLIN SODIUM 1 G/3ML
2 INJECTION, POWDER, FOR SOLUTION INTRAMUSCULAR; INTRAVENOUS
Status: COMPLETED | OUTPATIENT
Start: 2021-07-13 | End: 2021-07-13

## 2021-07-13 RX ORDER — LIDOCAINE HYDROCHLORIDE 10 MG/ML
1 INJECTION, SOLUTION EPIDURAL; INFILTRATION; INTRACAUDAL; PERINEURAL ONCE
Status: DISCONTINUED | OUTPATIENT
Start: 2021-07-13 | End: 2021-07-13 | Stop reason: HOSPADM

## 2021-07-13 RX ORDER — FENTANYL CITRATE 50 UG/ML
INJECTION, SOLUTION INTRAMUSCULAR; INTRAVENOUS
Status: DISCONTINUED | OUTPATIENT
Start: 2021-07-13 | End: 2021-07-13

## 2021-07-13 RX ORDER — SODIUM CHLORIDE AND POTASSIUM CHLORIDE 150; 900 MG/100ML; MG/100ML
INJECTION, SOLUTION INTRAVENOUS CONTINUOUS
Status: DISPENSED | OUTPATIENT
Start: 2021-07-13 | End: 2021-07-14

## 2021-07-13 RX ORDER — LIDOCAINE HYDROCHLORIDE 20 MG/ML
INJECTION INTRAVENOUS
Status: DISCONTINUED | OUTPATIENT
Start: 2021-07-13 | End: 2021-07-13

## 2021-07-13 RX ORDER — OXYCODONE HYDROCHLORIDE 5 MG/1
5 TABLET ORAL EVERY 4 HOURS PRN
Status: DISCONTINUED | OUTPATIENT
Start: 2021-07-13 | End: 2021-07-14 | Stop reason: HOSPADM

## 2021-07-13 RX ORDER — PHENYLEPHRINE HCL IN 0.9% NACL 1 MG/10 ML
SYRINGE (ML) INTRAVENOUS
Status: DISCONTINUED | OUTPATIENT
Start: 2021-07-13 | End: 2021-07-13

## 2021-07-13 RX ORDER — CLONAZEPAM 0.5 MG/1
0.5 TABLET ORAL NIGHTLY PRN
Status: DISCONTINUED | OUTPATIENT
Start: 2021-07-13 | End: 2021-07-14 | Stop reason: HOSPADM

## 2021-07-13 RX ADMIN — Medication 200 MCG: at 01:07

## 2021-07-13 RX ADMIN — OXYCODONE 5 MG: 5 TABLET ORAL at 06:07

## 2021-07-13 RX ADMIN — Medication 100 MCG: at 12:07

## 2021-07-13 RX ADMIN — PROPOFOL 30 MG: 10 INJECTION, EMULSION INTRAVENOUS at 12:07

## 2021-07-13 RX ADMIN — PROPOFOL 20 MG: 10 INJECTION, EMULSION INTRAVENOUS at 12:07

## 2021-07-13 RX ADMIN — AMLODIPINE BESYLATE 5 MG: 5 TABLET ORAL at 09:07

## 2021-07-13 RX ADMIN — OXYCODONE 5 MG: 5 TABLET ORAL at 02:07

## 2021-07-13 RX ADMIN — MIDAZOLAM HYDROCHLORIDE 2 MG: 1 INJECTION, SOLUTION INTRAMUSCULAR; INTRAVENOUS at 11:07

## 2021-07-13 RX ADMIN — HYDROMORPHONE HYDROCHLORIDE 0.2 MG: 1 INJECTION, SOLUTION INTRAMUSCULAR; INTRAVENOUS; SUBCUTANEOUS at 03:07

## 2021-07-13 RX ADMIN — CARBIDOPA AND LEVODOPA 1 TABLET: 25; 100 TABLET ORAL at 03:07

## 2021-07-13 RX ADMIN — HYDROMORPHONE HYDROCHLORIDE 0.2 MG: 1 INJECTION, SOLUTION INTRAMUSCULAR; INTRAVENOUS; SUBCUTANEOUS at 02:07

## 2021-07-13 RX ADMIN — ONDANSETRON 4 MG: 2 INJECTION INTRAMUSCULAR; INTRAVENOUS at 12:07

## 2021-07-13 RX ADMIN — FENTANYL CITRATE 25 MCG: 50 INJECTION, SOLUTION INTRAMUSCULAR; INTRAVENOUS at 12:07

## 2021-07-13 RX ADMIN — DESVENLAFAXINE SUCCINATE 100 MG: 100 TABLET, EXTENDED RELEASE ORAL at 09:07

## 2021-07-13 RX ADMIN — CEFAZOLIN 2 G: 330 INJECTION, POWDER, FOR SOLUTION INTRAMUSCULAR; INTRAVENOUS at 11:07

## 2021-07-13 RX ADMIN — INDIGO CARMINE 8 MG: 8 INJECTION, SOLUTION INTRAMUSCULAR; INTRAVENOUS at 12:07

## 2021-07-13 RX ADMIN — PROPOFOL 200 MG: 10 INJECTION, EMULSION INTRAVENOUS at 11:07

## 2021-07-13 RX ADMIN — FENTANYL CITRATE 50 MCG: 50 INJECTION, SOLUTION INTRAMUSCULAR; INTRAVENOUS at 11:07

## 2021-07-13 RX ADMIN — LIDOCAINE HYDROCHLORIDE 75 MG: 20 INJECTION, SOLUTION INTRAVENOUS at 11:07

## 2021-07-13 RX ADMIN — ACETAMINOPHEN 650 MG: 325 TABLET ORAL at 06:07

## 2021-07-13 RX ADMIN — CARBIDOPA AND LEVODOPA 1 TABLET: 25; 100 TABLET ORAL at 09:07

## 2021-07-14 VITALS
WEIGHT: 230 LBS | HEIGHT: 66 IN | OXYGEN SATURATION: 97 % | TEMPERATURE: 96 F | RESPIRATION RATE: 18 BRPM | SYSTOLIC BLOOD PRESSURE: 123 MMHG | BODY MASS INDEX: 36.96 KG/M2 | DIASTOLIC BLOOD PRESSURE: 68 MMHG | HEART RATE: 77 BPM

## 2021-07-14 LAB
ANION GAP SERPL CALC-SCNC: 12 MMOL/L (ref 8–16)
BASOPHILS # BLD AUTO: 0.01 K/UL (ref 0–0.2)
BASOPHILS NFR BLD: 0.2 % (ref 0–1.9)
BUN SERPL-MCNC: 20 MG/DL (ref 6–20)
CALCIUM SERPL-MCNC: 9.4 MG/DL (ref 8.7–10.5)
CHLORIDE SERPL-SCNC: 110 MMOL/L (ref 95–110)
CO2 SERPL-SCNC: 17 MMOL/L (ref 23–29)
CREAT SERPL-MCNC: 0.7 MG/DL (ref 0.5–1.4)
DIFFERENTIAL METHOD: ABNORMAL
EOSINOPHIL # BLD AUTO: 0 K/UL (ref 0–0.5)
EOSINOPHIL NFR BLD: 0 % (ref 0–8)
ERYTHROCYTE [DISTWIDTH] IN BLOOD BY AUTOMATED COUNT: 13 % (ref 11.5–14.5)
EST. GFR  (AFRICAN AMERICAN): >60 ML/MIN/1.73 M^2
EST. GFR  (NON AFRICAN AMERICAN): >60 ML/MIN/1.73 M^2
GLUCOSE SERPL-MCNC: 94 MG/DL (ref 70–110)
HCT VFR BLD AUTO: 35.7 % (ref 37–48.5)
HGB BLD-MCNC: 11.8 G/DL (ref 12–16)
IMM GRANULOCYTES # BLD AUTO: 0.04 K/UL (ref 0–0.04)
IMM GRANULOCYTES NFR BLD AUTO: 0.7 % (ref 0–0.5)
LYMPHOCYTES # BLD AUTO: 1.1 K/UL (ref 1–4.8)
LYMPHOCYTES NFR BLD: 17.6 % (ref 18–48)
MCH RBC QN AUTO: 29.5 PG (ref 27–31)
MCHC RBC AUTO-ENTMCNC: 33.1 G/DL (ref 32–36)
MCV RBC AUTO: 89 FL (ref 82–98)
MONOCYTES # BLD AUTO: 0.5 K/UL (ref 0.3–1)
MONOCYTES NFR BLD: 8.5 % (ref 4–15)
NEUTROPHILS # BLD AUTO: 4.4 K/UL (ref 1.8–7.7)
NEUTROPHILS NFR BLD: 73 % (ref 38–73)
NRBC BLD-RTO: 0 /100 WBC
PLATELET # BLD AUTO: 147 K/UL (ref 150–450)
PLATELET BLD QL SMEAR: ABNORMAL
PMV BLD AUTO: 11.1 FL (ref 9.2–12.9)
POTASSIUM SERPL-SCNC: 4.6 MMOL/L (ref 3.5–5.1)
RBC # BLD AUTO: 4 M/UL (ref 4–5.4)
SODIUM SERPL-SCNC: 139 MMOL/L (ref 136–145)
WBC # BLD AUTO: 6.01 K/UL (ref 3.9–12.7)

## 2021-07-14 PROCEDURE — 80048 BASIC METABOLIC PNL TOTAL CA: CPT | Performed by: STUDENT IN AN ORGANIZED HEALTH CARE EDUCATION/TRAINING PROGRAM

## 2021-07-14 PROCEDURE — 25000003 PHARM REV CODE 250: Performed by: STUDENT IN AN ORGANIZED HEALTH CARE EDUCATION/TRAINING PROGRAM

## 2021-07-14 PROCEDURE — 36415 COLL VENOUS BLD VENIPUNCTURE: CPT | Performed by: STUDENT IN AN ORGANIZED HEALTH CARE EDUCATION/TRAINING PROGRAM

## 2021-07-14 PROCEDURE — 85025 COMPLETE CBC W/AUTO DIFF WBC: CPT | Performed by: STUDENT IN AN ORGANIZED HEALTH CARE EDUCATION/TRAINING PROGRAM

## 2021-07-14 RX ORDER — POLYETHYLENE GLYCOL 3350 17 G/17G
17 POWDER, FOR SOLUTION ORAL DAILY PRN
Qty: 238 G | Refills: 0 | Status: SHIPPED | OUTPATIENT
Start: 2021-07-14 | End: 2021-10-22

## 2021-07-14 RX ORDER — CEPHALEXIN 500 MG/1
500 CAPSULE ORAL EVERY 12 HOURS
Qty: 6 CAPSULE | Refills: 0 | Status: SHIPPED | OUTPATIENT
Start: 2021-07-14 | End: 2021-10-22 | Stop reason: ALTCHOICE

## 2021-07-14 RX ORDER — HYDROCODONE BITARTRATE AND ACETAMINOPHEN 5; 325 MG/1; MG/1
1 TABLET ORAL EVERY 6 HOURS PRN
Qty: 5 TABLET | Refills: 0 | Status: SHIPPED | OUTPATIENT
Start: 2021-07-14 | End: 2021-10-22

## 2021-07-14 RX ADMIN — OXYCODONE 5 MG: 5 TABLET ORAL at 08:07

## 2021-07-14 RX ADMIN — CARBIDOPA AND LEVODOPA 1 TABLET: 25; 100 TABLET ORAL at 08:07

## 2021-07-14 RX ADMIN — OXYCODONE 5 MG: 5 TABLET ORAL at 12:07

## 2021-07-14 RX ADMIN — ACETAMINOPHEN 650 MG: 325 TABLET ORAL at 07:07

## 2021-07-14 RX ADMIN — ACETAMINOPHEN 650 MG: 325 TABLET ORAL at 12:07

## 2021-07-22 ENCOUNTER — PATIENT MESSAGE (OUTPATIENT)
Dept: UROLOGY | Facility: CLINIC | Age: 56
End: 2021-07-22

## 2021-07-30 ENCOUNTER — OFFICE VISIT (OUTPATIENT)
Dept: UROLOGY | Facility: CLINIC | Age: 56
End: 2021-07-30
Payer: COMMERCIAL

## 2021-07-30 VITALS
HEIGHT: 66 IN | HEART RATE: 96 BPM | SYSTOLIC BLOOD PRESSURE: 115 MMHG | WEIGHT: 189.81 LBS | DIASTOLIC BLOOD PRESSURE: 74 MMHG | BODY MASS INDEX: 30.51 KG/M2

## 2021-07-30 DIAGNOSIS — Z98.890 POST-OPERATIVE STATE: Primary | ICD-10-CM

## 2021-07-30 PROCEDURE — 99999 PR PBB SHADOW E&M-EST. PATIENT-LVL III: ICD-10-PCS | Mod: PBBFAC,,, | Performed by: UROLOGY

## 2021-07-30 PROCEDURE — 3074F SYST BP LT 130 MM HG: CPT | Mod: CPTII,S$GLB,, | Performed by: UROLOGY

## 2021-07-30 PROCEDURE — 3078F DIAST BP <80 MM HG: CPT | Mod: CPTII,S$GLB,, | Performed by: UROLOGY

## 2021-07-30 PROCEDURE — 3008F PR BODY MASS INDEX (BMI) DOCUMENTED: ICD-10-PCS | Mod: CPTII,S$GLB,, | Performed by: UROLOGY

## 2021-07-30 PROCEDURE — 3008F BODY MASS INDEX DOCD: CPT | Mod: CPTII,S$GLB,, | Performed by: UROLOGY

## 2021-07-30 PROCEDURE — 3074F PR MOST RECENT SYSTOLIC BLOOD PRESSURE < 130 MM HG: ICD-10-PCS | Mod: CPTII,S$GLB,, | Performed by: UROLOGY

## 2021-07-30 PROCEDURE — 1159F PR MEDICATION LIST DOCUMENTED IN MEDICAL RECORD: ICD-10-PCS | Mod: CPTII,S$GLB,, | Performed by: UROLOGY

## 2021-07-30 PROCEDURE — 1159F MED LIST DOCD IN RCRD: CPT | Mod: CPTII,S$GLB,, | Performed by: UROLOGY

## 2021-07-30 PROCEDURE — 99024 PR POST-OP FOLLOW-UP VISIT: ICD-10-PCS | Mod: S$GLB,,, | Performed by: UROLOGY

## 2021-07-30 PROCEDURE — 99024 POSTOP FOLLOW-UP VISIT: CPT | Mod: S$GLB,,, | Performed by: UROLOGY

## 2021-07-30 PROCEDURE — 3078F PR MOST RECENT DIASTOLIC BLOOD PRESSURE < 80 MM HG: ICD-10-PCS | Mod: CPTII,S$GLB,, | Performed by: UROLOGY

## 2021-07-30 PROCEDURE — 99999 PR PBB SHADOW E&M-EST. PATIENT-LVL III: CPT | Mod: PBBFAC,,, | Performed by: UROLOGY

## 2021-08-11 ENCOUNTER — PATIENT MESSAGE (OUTPATIENT)
Dept: NEUROLOGY | Facility: CLINIC | Age: 56
End: 2021-08-11

## 2021-08-11 DIAGNOSIS — R27.0 ATAXIA: ICD-10-CM

## 2021-08-11 DIAGNOSIS — G23.8 MULTIPLE SYSTEM ATROPHY: Primary | ICD-10-CM

## 2021-08-11 DIAGNOSIS — G90.3 MULTIPLE SYSTEM ATROPHY: Primary | ICD-10-CM

## 2021-08-12 ENCOUNTER — TELEPHONE (OUTPATIENT)
Dept: SLEEP MEDICINE | Facility: OTHER | Age: 56
End: 2021-08-12

## 2021-08-12 ENCOUNTER — PATIENT MESSAGE (OUTPATIENT)
Dept: NEUROLOGY | Facility: CLINIC | Age: 56
End: 2021-08-12

## 2021-08-12 ENCOUNTER — PATIENT MESSAGE (OUTPATIENT)
Dept: SLEEP MEDICINE | Facility: OTHER | Age: 56
End: 2021-08-12

## 2021-08-12 DIAGNOSIS — G90.3 MULTIPLE SYSTEM ATROPHY: Primary | ICD-10-CM

## 2021-08-12 DIAGNOSIS — R27.0 ATAXIA: ICD-10-CM

## 2021-08-12 DIAGNOSIS — G23.8 MULTIPLE SYSTEM ATROPHY: Primary | ICD-10-CM

## 2021-09-03 ENCOUNTER — PATIENT MESSAGE (OUTPATIENT)
Dept: NEUROLOGY | Facility: CLINIC | Age: 56
End: 2021-09-03

## 2021-09-09 ENCOUNTER — PATIENT MESSAGE (OUTPATIENT)
Dept: ADMINISTRATIVE | Facility: OTHER | Age: 56
End: 2021-09-09

## 2021-09-14 ENCOUNTER — TELEPHONE (OUTPATIENT)
Dept: NEUROLOGY | Facility: CLINIC | Age: 56
End: 2021-09-14

## 2021-10-22 ENCOUNTER — OFFICE VISIT (OUTPATIENT)
Dept: UROLOGY | Facility: CLINIC | Age: 56
End: 2021-10-22
Payer: COMMERCIAL

## 2021-10-22 ENCOUNTER — OFFICE VISIT (OUTPATIENT)
Dept: NEUROLOGY | Facility: CLINIC | Age: 56
End: 2021-10-22
Payer: COMMERCIAL

## 2021-10-22 VITALS — DIASTOLIC BLOOD PRESSURE: 74 MMHG | HEART RATE: 89 BPM | SYSTOLIC BLOOD PRESSURE: 105 MMHG

## 2021-10-22 VITALS — SYSTOLIC BLOOD PRESSURE: 127 MMHG | HEART RATE: 82 BPM | DIASTOLIC BLOOD PRESSURE: 88 MMHG

## 2021-10-22 DIAGNOSIS — Z98.890 STATUS POST UROLOGICAL SURGERY: Primary | ICD-10-CM

## 2021-10-22 DIAGNOSIS — R13.10 DYSPHAGIA, UNSPECIFIED TYPE: Primary | ICD-10-CM

## 2021-10-22 DIAGNOSIS — R27.0 ATAXIA: ICD-10-CM

## 2021-10-22 DIAGNOSIS — G20.C PARKINSONISM, UNSPECIFIED PARKINSONISM TYPE: ICD-10-CM

## 2021-10-22 PROCEDURE — 99215 OFFICE O/P EST HI 40 MIN: CPT | Mod: S$GLB,,, | Performed by: PSYCHIATRY & NEUROLOGY

## 2021-10-22 PROCEDURE — 3074F SYST BP LT 130 MM HG: CPT | Mod: CPTII,S$GLB,, | Performed by: PSYCHIATRY & NEUROLOGY

## 2021-10-22 PROCEDURE — 3074F PR MOST RECENT SYSTOLIC BLOOD PRESSURE < 130 MM HG: ICD-10-PCS | Mod: CPTII,S$GLB,, | Performed by: UROLOGY

## 2021-10-22 PROCEDURE — 99213 OFFICE O/P EST LOW 20 MIN: CPT | Mod: S$GLB,,, | Performed by: UROLOGY

## 2021-10-22 PROCEDURE — 3079F DIAST BP 80-89 MM HG: CPT | Mod: CPTII,S$GLB,, | Performed by: UROLOGY

## 2021-10-22 PROCEDURE — 81002 URINALYSIS NONAUTO W/O SCOPE: CPT | Mod: S$GLB,,, | Performed by: UROLOGY

## 2021-10-22 PROCEDURE — 1159F MED LIST DOCD IN RCRD: CPT | Mod: CPTII,S$GLB,, | Performed by: PSYCHIATRY & NEUROLOGY

## 2021-10-22 PROCEDURE — 99215 PR OFFICE/OUTPT VISIT, EST, LEVL V, 40-54 MIN: ICD-10-PCS | Mod: S$GLB,,, | Performed by: PSYCHIATRY & NEUROLOGY

## 2021-10-22 PROCEDURE — 81002 PR URINALYSIS NONAUTO W/O SCOPE: ICD-10-PCS | Mod: S$GLB,,, | Performed by: UROLOGY

## 2021-10-22 PROCEDURE — 3078F DIAST BP <80 MM HG: CPT | Mod: CPTII,S$GLB,, | Performed by: PSYCHIATRY & NEUROLOGY

## 2021-10-22 PROCEDURE — 99999 PR PBB SHADOW E&M-EST. PATIENT-LVL III: ICD-10-PCS | Mod: PBBFAC,,, | Performed by: PSYCHIATRY & NEUROLOGY

## 2021-10-22 PROCEDURE — 3074F PR MOST RECENT SYSTOLIC BLOOD PRESSURE < 130 MM HG: ICD-10-PCS | Mod: CPTII,S$GLB,, | Performed by: PSYCHIATRY & NEUROLOGY

## 2021-10-22 PROCEDURE — 3078F PR MOST RECENT DIASTOLIC BLOOD PRESSURE < 80 MM HG: ICD-10-PCS | Mod: CPTII,S$GLB,, | Performed by: PSYCHIATRY & NEUROLOGY

## 2021-10-22 PROCEDURE — 99999 PR PBB SHADOW E&M-EST. PATIENT-LVL II: CPT | Mod: PBBFAC,,, | Performed by: UROLOGY

## 2021-10-22 PROCEDURE — 99999 PR PBB SHADOW E&M-EST. PATIENT-LVL III: CPT | Mod: PBBFAC,,, | Performed by: PSYCHIATRY & NEUROLOGY

## 2021-10-22 PROCEDURE — 3074F SYST BP LT 130 MM HG: CPT | Mod: CPTII,S$GLB,, | Performed by: UROLOGY

## 2021-10-22 PROCEDURE — 99999 PR PBB SHADOW E&M-EST. PATIENT-LVL II: ICD-10-PCS | Mod: PBBFAC,,, | Performed by: UROLOGY

## 2021-10-22 PROCEDURE — 99213 PR OFFICE/OUTPT VISIT, EST, LEVL III, 20-29 MIN: ICD-10-PCS | Mod: S$GLB,,, | Performed by: UROLOGY

## 2021-10-22 PROCEDURE — 1159F PR MEDICATION LIST DOCUMENTED IN MEDICAL RECORD: ICD-10-PCS | Mod: CPTII,S$GLB,, | Performed by: PSYCHIATRY & NEUROLOGY

## 2021-10-22 PROCEDURE — 3079F PR MOST RECENT DIASTOLIC BLOOD PRESSURE 80-89 MM HG: ICD-10-PCS | Mod: CPTII,S$GLB,, | Performed by: UROLOGY

## 2021-12-23 ENCOUNTER — PATIENT MESSAGE (OUTPATIENT)
Dept: NEUROLOGY | Facility: CLINIC | Age: 56
End: 2021-12-23
Payer: COMMERCIAL

## 2022-02-14 ENCOUNTER — PATIENT MESSAGE (OUTPATIENT)
Dept: NEUROLOGY | Facility: CLINIC | Age: 57
End: 2022-02-14

## 2022-02-14 ENCOUNTER — OFFICE VISIT (OUTPATIENT)
Dept: NEUROLOGY | Facility: CLINIC | Age: 57
End: 2022-02-14
Payer: COMMERCIAL

## 2022-02-14 DIAGNOSIS — G20.C PARKINSONISM, UNSPECIFIED PARKINSONISM TYPE: ICD-10-CM

## 2022-02-14 DIAGNOSIS — G90.3 MULTIPLE SYSTEM ATROPHY: Primary | ICD-10-CM

## 2022-02-14 DIAGNOSIS — G23.8 MULTIPLE SYSTEM ATROPHY: Primary | ICD-10-CM

## 2022-02-14 PROCEDURE — 99215 OFFICE O/P EST HI 40 MIN: CPT | Mod: 95,,, | Performed by: PSYCHIATRY & NEUROLOGY

## 2022-02-14 PROCEDURE — 99215 PR OFFICE/OUTPT VISIT, EST, LEVL V, 40-54 MIN: ICD-10-PCS | Mod: 95,,, | Performed by: PSYCHIATRY & NEUROLOGY

## 2022-02-14 NOTE — ASSESSMENT & PLAN NOTE
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

## 2022-02-14 NOTE — ASSESSMENT & PLAN NOTE
Moderate progressive ataxia, worsening.  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  Dysautonomia which also fits.    Suggested start taking orthostativc vitals  Othersie abdominal binder or midodrine    Suggested continue exercising for balance  Suggetsed start hobbies for manual dexterity    Send trials coordinator number from [email or web address]

## 2022-02-14 NOTE — PROGRESS NOTES
The patient location is: home  The chief complaint leading to consultation is: MSA    Visit type: audiovisual    Face to Face time with patient: 20mins  20 minutes of total time spent on the encounter, which includes face to face time and non-face to face time preparing to see the patient (eg, review of tests), Obtaining and/or reviewing separately obtained history, Documenting clinical information in the electronic or other health record, Independently interpreting results (not separately reported) and communicating results to the patient/family/caregiver, or Care coordination (not separately reported).       Each patient to whom he or she provides medical services by telemedicine is:  (1) informed of the relationship between the physician and patient and the respective role of any other health care provider with respect to management of the patient; and (2) notified that he or she may decline to receive medical services by telemedicine and may withdraw from such care at any time.    Notes:       MOVEMENT DISORDERS CLINIC Followup  PCP/Referring Provider: No referring provider defined for this encounter.  Date of Service: 2/14/2022    Chief Complaint: Ataxia    Interval Hx    Since last visit,   Stopped carbidopa/levodopa 25/100mg 1 tab PO TID - she does not miss it  Stopped Norvasc  Dizzy on standing still but not as prominent- no blood pressure readings at home yet    Still Stopped working due to falls    Dyarthria worse   Using a rolling walker - stopped PT  Falls weekly at least  Doing PT self guided    No stridor  No change to sweating  Does have REM sleep behavior which is advancing - not treated   does not hear gasping  Does have unexplained heart racing    Struggling to write  Can no longer do stairs.  Still having trouble writing checks   strength decreased   Speech is progressively slurred    Feels like memory is poor at times - trouble recalling names    Workup  MRI brain showed decrease in  "Pontine contour suggesting of MSA  PAGE scans showed normal uptake  EMG was normal 2019  CT C/AB/PEL was neg for malignancy    MELVIN 65 NL  TPO POS  Notes her son was diagnosed with PANDAS and "has funny eye movements" - poor fine motor skills as a kid    Does have REM sleep behavior issues for 3 months    PD Review of Symptoms:  Anosmia: None  Dysarthria/Hypophonia: None  Dysphagia/Sialorrhea: None  Hallucinations: None  Depression: None  Cognitive slowing: None  Urinary changes: None  Constipation: at times  Orthostasis: none  Falls: as above  Micrographia: none  Sleep issues:  -SUSAN: at times snores  -RBD: Talks and punches in her sleep  -Sleep Quality: good, wakes every 3 hours"    "PriorHPI: Nirali Kirkpatrick is a L HANDED 56 y.o. female with a medical issues significant for HTN, Anxiety, Vit B12 deficiency, who presents with ataxia for 1.5 years. She first noted 1.5 years ago her R foot was dragging and every 3-4 steps she would catch that toe. She noticed her R foot would catch on curbs. Going down steps is especially hard - she mis-steps often. She feels like she has progressively been imbalanced. Her first fall was a year ago. She has fallen more often since then. She falls twice a month now. Falls typically sideways. She holds onto her  for stability but does not use an assist device. Tends to have more ataxia after she's tired. She occasionally has a had tremor in her hands when starting IVs. She does at times feel clumsy with her hands. No spinning vertigo. No lightheadedness. No visual issues.    No double vision, eye drooping, head drop, dysphagia.  She was told her B12 was low normal Aug 2018, and has had 4 shots since    No dysarthria.    She saw a general neurologist  B6 - NL  B12 - 333    Brain MRI read as normal  Spine and Tspine 2018 - no central disc issues  Has not had an EMG    No fam hx ataxia, MS  Uncle has M. Gravis  Mother and brother have had tremors      Review of Systems:   Review of " Systems   Constitutional: Negative for fever.   HENT: Negative for congestion.    Eyes: Negative for double vision.   Respiratory: Negative for cough and shortness of breath.    Cardiovascular: Negative for chest pain and leg swelling.   Gastrointestinal: Negative for nausea.   Genitourinary: Negative for dysuria.   Musculoskeletal: Positive for falls.   Skin: Negative for rash.   Neurological: Positive for tremors. Negative for speech change and headaches.   Psychiatric/Behavioral: Negative for depression.         Current Medications:  Outpatient Encounter Medications as of 2/14/2022   Medication Sig Dispense Refill    amLODIPine (NORVASC) 5 MG tablet Take 5 mg by mouth every evening.       b complex vitamins tablet Take 1 tablet by mouth once daily.      carbidopa-levodopa  mg (SINEMET)  mg per tablet Take 1 tablet by mouth 3 (three) times daily. 90 tablet 11    clonazePAM (KLONOPIN) 0.5 MG tablet Take 1 tablet by mouth twice daily as needed for anxiety 30 tablet 0    cyanocobalamin 1,000 mcg/mL injection 1,000 mcg every 30 days.       desvenlafaxine succinate (PRISTIQ) 100 MG Tb24 100 mg every evening.       multivitamin capsule Take 1 capsule by mouth once daily.      tolterodine (DETROL LA) 4 MG 24 hr capsule Take 1 capsule (4 mg total) by mouth once daily. (Patient taking differently: Take 4 mg by mouth nightly. ) 30 capsule 11     No facility-administered encounter medications on file as of 2/14/2022.       Past Medical History:  HTN    Past Surgical History:  Gallbladder, gastric sleeve    Current Living Situation: home    Social:  Social History     Socioeconomic History    Marital status:    Tobacco Use    Smoking status: Never Smoker    Smokeless tobacco: Never Used   Substance and Sexual Activity    Alcohol use: Not Currently    Drug use: Never   Social History Narrative    ** Merged History Encounter **            Family History:  As above    PHYSICAL:  There were no  "vitals taken for this visit.    Physical Exam  Constitutional: Well-developed, well-nourished, appears stated age  Eyes: No scleral icterus  ENT: Moist oral mucosa  Cardiovascular: No lower extremity edema   Respiratory: No labored breathing   Skin: No rash   Hematologic: No bruising    Other: GI/ deferred   Mental status: Alert and oriented to person, place, time, and situation;   follows commands  Speech: mod dysarthric), no aphasia  Cranial nerves:            CN II: Pupils mid-position and equal, not tested light or accommodation  CN III, IV, VI: Extraocular movements full, no nystagmus visualized  CN V: Not tested   CN VII: Face strong and symmetric bilaterally   CN VIII: Hearing intact to voice and conversation   CN IX, X: Palate raises midline and symmetric   CN XI: Strong shoulder shrug B/L  CN XII: Tongue appears midline   Motor: Normal bulk by appearance, no drift   Sensory: Not tested    Gait: Not tested  Deep tendon reflexes: Not tested  Movement/Coordination                    No hypophonic speech.                     No facial masking.  Mild bradykinesia.  Mild L hand resting tremor  FNF R>L intention tremor  No stridor                  Bradykinesia    Finger taps Finger flicks PEDRO Heel taps   Left 0 0 0 0   Right 1+ 0 0 0       Tremor Exam   Arms extended Arms in wing position, fingers almost touching Re-emergent Arms extended wrists extended Intention Resting Kinetic   Left      +    ++       Right          +       Head tremor: No-No Yes-Yes NE        "General Medical Examination:  General: Good hygiene, appropriate appearance.  HEENT: Normocephalic, atraumatic.   Neck: Supple.   Chest: Unlabored breathing.   CV: Symmetric pulses.   Ext: No clubbing, cyanosis, or edema.     Mental Status:  Mood/Affect: Appropriate/congruent.  Level of consciousness: Awake, alert.  Orientation: Oriented to person, place, time and situation.  Language: Mod Dysarthria    Cranial nerves:  I: Not tested  II: PERRL, VFF " "to counting  III, IV, VI: EOMI with conjugate gaze and no nystagmus on end gaze - no SWJs  V: Facial sensation intact and symmetric over the bilateral V1-V3  VII: Facial muscle activation intact and symmetric over the bilateral upper and lower face  VIII: Hearing intact in the b/l ears and symmetrical to finger rub  IX, X, XII: TUP midline - no atrophy or fasiculations  X: SCMs and shoulder shrug full strength b/l and symmetric  Trouble with PA PA TA TA  Trouble with GAs and Naomy      Motor:   Cannot squeeze and hold 's hands   When lifting her knees she cannot overcome her  pushing it down  No stridor    Hyperreflexic 3+ at pattelae      Finger taps Finger flicks PEDRO Heel taps   Left nl nl nl nl   Right nl nl Incoordinated nl   Neck tone: nl    Arm Leg   Left nl nl   Right nl nl         Coordination:   -Finger to nose: intention tremor mild bilat  Past-points 1 inch R hand >L hand  Disdiatochokinesias R hand moderate  Disdiatochokinesias L hand mild  -Heel to shin: mild issues R foot  R hand spiral +++  L hand spiral ++    No resting or postural tremor    Gait:  -Arises from chair without use of hands.  -Casual gait is: wide based, somewhat stiff and staggering, circumducts, waves moderately L to R when she walks - mod ataxic   -Stride length: nl  -Arm Swing: decreased R  -Turning: wide  -Tandem gait: cannot  No foot drop"      Laboratory Data:  Results for RADHA HAHN (MRN [id number]) as of 6/3/2019 09:52   Ref. Range 5/2/2019 11:23   Folate Latest Ref Range: 4.0 - 24.0 ng/mL 10.7   Vitamin B-12 Latest Ref Range: 180 - 914 ng/L 525   Sodium Latest Ref Range: 136 - 145 mmol/L 141   Potassium Latest Ref Range: 3.5 - 5.1 mmol/L 3.7   Chloride Latest Ref Range: 95 - 110 mmol/L 106   CO2 Latest Ref Range: 23 - 29 mmol/L 26   Anion Gap Latest Ref Range: 8 - 16 mmol/L 9   BUN, Bld Latest Ref Range: 6 - 20 mg/dL 22 (H)   Creatinine Latest Ref Range: 0.5 - 1.4 mg/dL 0.8   eGFR if non African American " Latest Ref Range: >60 mL/min/1.73 m^2 >60.0   eGFR if African American Latest Ref Range: >60 mL/min/1.73 m^2 >60.0   Glucose Latest Ref Range: 70 - 110 mg/dL 92   Calcium Latest Ref Range: 8.7 - 10.5 mg/dL 9.8   Alkaline Phosphatase Latest Ref Range: 55 - 135 U/L 86   PROTEIN TOTAL Latest Ref Range: 6.0 - 8.4 g/dL 7.4   Albumin Latest Ref Range: 3.5 - 5.2 g/dL 4.2   BILIRUBIN TOTAL Latest Ref Range: 0.1 - 1.0 mg/dL 0.6   AST Latest Ref Range: 10 - 40 U/L 19   ALT Latest Ref Range: 10 - 44 U/L 16   Ammonia Latest Ref Range: 10 - 50 umol/L 26   Arsenic Latest Ref Range: 0 - 12 ng/mL <1   Thiamine Latest Ref Range: 38 - 122 ug/L 63   Vitamin E Latest Ref Range: 500 - 1800 ug/dL 1302   Glutamic Acid Decarb Ab Latest Ref Range: <=0.02 nmol/L 0.00   TSH Latest Ref Range: 0.400 - 4.000 uIU/mL 0.649   PTH Latest Ref Range: 9.0 - 77.0 pg/mL 103.0 (H)   Race Unknown Test Not Performed   AChR Binding Ab, Serum Latest Ref Range: <=0.02 nmol/L 0.00   AChR Ganglionic Neuronal Ab Latest Ref Range: <=0.02 nmol/L 0.00   CRMP-5 IgG Latest Ref Range: <1:240 titer Negative   Neuronal (V-G) K+ Channel Ab, Serum Latest Ref Range: <=0.02 nmol/L 0.00   NMO Interpretive Comments Unknown SEE BELOW   N-Type Calcium Channel Ab Latest Ref Range: <=0.03 nmol/L 0.00   P/Q Type Calcium Channel Ab Latest Ref Range: <=0.02 nmol/L 0.00   PAVAL AGNA-1, Serum Latest Ref Range: <1:240 titer Negative   PAVAL TOBY-1, Serum Latest Ref Range: <1:240 titer Negative   PAVAL TOBY-2, Serum Latest Ref Range: <1:240 titer Negative   PAVAL TOBY-3, Serum Latest Ref Range: <1:240 titer Negative   PAVAL reflex test added Unknown None.   PAVAL,  Amphiphysin Ab, Serum Latest Ref Range: <1:240 titer Negative   PAVAL, PCA-1, Serum Latest Ref Range: <1:240 titer Negative   PAVAL, PCA-2, Serum Latest Ref Range: <1:240 titer Negative   PAVAL, PCA-Tr, Serum Latest Ref Range: <1:240 titer Negative   STRIATED MUSCLE AB Latest Ref Range: <1:120 titer Negative   Lead Latest Ref  Range: 0.0 - 4.9 mcg/dL <1.0   Cadmium Latest Ref Range: 0.0 - 4.9 ng/mL 0.3   City Unknown Test Not Performed   County Unknown Test Not Performed   Guardian First Name Unknown Test Not Performed   Guardian Last Name Unknown Test Not Performed   Home Phone Unknown Test Not Performed   [place] Latest Ref Range: 0 - 9 ng/mL 2   State Unknown Test Not Performed   Street Address Unknown Test Not Performed   Venous/Capillary Unknown Test Not Performed   Zip Unknown Test Not Performed      Results for RADHA HAHN (MRN [id number]) as of 8/23/2019 10:10   Ref. Range 6/3/2019 10:27   Sed Rate Latest Ref Range: 0 - 36 mm/Hr 13   CRP Latest Ref Range: 0.0 - 8.2 mg/L 0.5   CPK Latest Ref Range: 20 - 180 U/L 55   CERULOPLASMIN Latest Ref Range: 15.0 - 45.0 mg/dL 33.0   Acetylchol Modul Ab Latest Units: % 0   AChR Binding Ab, Serum Latest Ref Range: <=0.02 nmol/L 0.00   MG Interpretive Comments Unknown SEE BELOW   MuSK Antibody Test Latest Ref Range: 0.00 - 0.02 nmol/L 0.00   STRIATED MUSCLE AB Latest Ref Range: <1:120 titer Negative   Aldolase Latest Ref Range: 1.2 - 7.6 U/L 3.5       Imaging:  Brain MRI w/o cerebellar degeneration or lesion - however poor quality MRI  Repeat brain MRI  FINDINGS:  MRI of the brain demonstrates no infarction, mass, hemorrhage, extra-axial collection, or other acute finding.  A skull base lesions are seen.  Following contrast administration, there is no abnormal enhancing lesion identified.  A small incidental developmental venous anomaly is seen in the right medial posterior thalamus.  Cspine w/o without central impingement    EMG  Summary   Nerve conduction study of bilateral lower extremities revealed normal antidromic sensory peak latencies, action potentials, and conduction velocities.   Motor peak latencies, amplitudes, and conduction velocities were normal. F-waves were normal.   Concentric needle examination of selected muscles in bilateral lower extremities revealed no evidence of  acute or chronic denervation.   Impression   This is a normal study.     Assessment//Plan:   Problem List Items Addressed This Visit        Neuro    Multiple system atrophy - Primary    Current Assessment & Plan     Moderate progressive ataxia, worsening.  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  Dysautonomia which also fits.    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

## 2022-02-19 ENCOUNTER — PATIENT MESSAGE (OUTPATIENT)
Dept: NEUROLOGY | Facility: CLINIC | Age: 57
End: 2022-02-19
Payer: COMMERCIAL

## 2022-02-21 ENCOUNTER — PATIENT MESSAGE (OUTPATIENT)
Dept: NEUROLOGY | Facility: CLINIC | Age: 57
End: 2022-02-21
Payer: COMMERCIAL

## 2022-02-21 ENCOUNTER — CLINICAL SUPPORT (OUTPATIENT)
Dept: REHABILITATION | Facility: HOSPITAL | Age: 57
End: 2022-02-21
Attending: PSYCHIATRY & NEUROLOGY
Payer: COMMERCIAL

## 2022-02-21 DIAGNOSIS — G23.8 MULTIPLE SYSTEM ATROPHY: ICD-10-CM

## 2022-02-21 DIAGNOSIS — R47.1 DYSARTHRIA: ICD-10-CM

## 2022-02-21 DIAGNOSIS — R13.10 DYSPHAGIA, UNSPECIFIED TYPE: Primary | ICD-10-CM

## 2022-02-21 DIAGNOSIS — G90.3 MULTIPLE SYSTEM ATROPHY: ICD-10-CM

## 2022-02-21 PROCEDURE — 92522 EVALUATE SPEECH PRODUCTION: CPT | Mod: PN

## 2022-02-21 PROCEDURE — 92610 EVALUATE SWALLOWING FUNCTION: CPT | Mod: PN

## 2022-02-21 NOTE — PATIENT INSTRUCTIONS
"Diaphragmatic breathing technique  Lie on your back on a flat surface or in bed, with your knees bent and your head supported. You can use a pillow under your knees to support your legs. Place one hand on your upper chest and the other just below your rib cage. This will allow you to feel your diaphragm move as you breathe.        Breathe in slowly through your nose so that your stomach moves out against your hand. The hand on your chest should remain as still as possible.      Tighten your stomach muscles, letting them fall inward as you exhale through pursed lips (see "Pursed Lip Breathing Technique_"). The hand on your upper chest must remain as still as possible.        When you first learn the diaphragmatic breathing technique, it may be easier for you to follow the instructions lying down, as shown on the first page. As you gain more practice, you can try the diaphragmatic breathing technique while sitting in a chair, as shown below.    To perform this exercise while sitting in a chair:        Sit comfortably, with your knees bent and your shoulders, head and neck relaxed.  Breathe in slowly through your nose so that your stomach moves out against your hand. The hand on your chest should remain as still as possible.  Place one hand on your upper chest and the other just below your rib cage. This will allow you to feel your diaphragm move as you breathe.  Tighten your stomach muscles, letting them fall inward as you exhale through pursed lips (see "Pursed Lip Breathing Technique"). The hand on your upper chest must remain as still as possible.  Note: You may notice an increased effort will be needed to use the diaphragm correctly. At first, you'll probably get tired while doing this exercise. But keep at it, because with continued practice, diaphragmatic breathing will become easy and automatic.    How often should I practice this exercise?  At first, practice this exercise 5-10 minutes about 3-4 times per day. " Gradually increase the amount of time you spend doing this exercise, and perhaps even increase the effort of the exercise by placing a book on your abdomen.      [email or web address]

## 2022-02-21 NOTE — PROGRESS NOTES
See initial evaluation in Plan of Care.    Chiquis Carlos M.A. CCC-SLP, CBIS  Speech Language Pathologist  Certified Brain Injury Specialist  2/21/2022

## 2022-02-21 NOTE — PLAN OF CARE
"[facility]  Speech Therapy Evaluation -Neurological Rehabilitation    Date: 2/21/2022     Name: Nirali Kirkpatrick   MRN: [id number]    Therapy Diagnosis:   Encounter Diagnoses   Name Primary?    Multiple system atrophy     Dysarthria     Dysphagia, unspecified type Yes    Physician: Ondina Polo MD  Physician Orders: Ambulatory Referral to Speech Therapy   Medical Diagnosis: Multiple System Atrophy     Visit # / Visits Authorized:  1 / 1   Date of Evaluation:  2/21/2022   Insurance Authorization Period: 2/21/2022 to 3/10/2022  Plan of Care Certification:    2/21/2022 to 4/1/2022       Time In:1033   Time Out: 1115   Procedure Min.   Evaluation of Speech Sound Production  20   Swallow and Oral Function Evaluation   22     Precautions: Standard and Fall  Subjective   Date of Onset: Diagnosed October 2021  History of Current Condition:  Nirali Kirkpatrick is a 56 y.o. female who presents to [facility] Outpatient Speech Therapy for evaluation secondary to Multiple System Atrophy. Patient was referred to therapy by Ondina Polo MD , which is the patient's Neurologist. Patient reports She was diagnosed in October 2021. 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 In regards to swallowing, Patient states "My throat feels tight/pressure, happens daily, couple of times a day". She states she is coughing on liquids, secretions are thick, Patient can eat anything she wants, Patient has not had not seen a baseline instrumental.  Past Medical History: Nirali Kirkpatrick  has a past medical history of Depression and Hypertension.  Nirali Kirkpatrick  has a past surgical history that includes Cholecystectomy; Gastric sleeve (2013); tubligation (2004); Fluoroscopic urodynamic study (N/A, 6/15/2021); Cystoscopy (N/A, 6/15/2021); Insertion of midurethral sling (N/A, 7/13/2021); and Cystoscopy (N/A, 7/13/2021).  Medical Hx and " Allergies: Nirali has a current medication list which includes the following prescription(s): amlodipine, b complex vitamins, clonazepam, cyanocobalamin, desvenlafaxine succinate, multivitamin, and tolterodine. 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 Evaluation of Speech Mechanisms  Respiration:  Posture: WNL  Breath Support: Reduced  Breath Rate: WNL  Respiratory Features: Abnormal: Thoracic    Oral Mechanism at Rest   Labial Structures  Structure WNL   Symmetry WNL   Tone WNL   Ability to control secretions WNL       Lingual Structure (at rest in mouth)   Structure WNL   Symmetry WNL   Tone WNL   Irregular Movement WNL       Mandible  Symmetry WNL   Posture at Rest WNL=closed   Dentition WNL     Face  Symmetry WNL   Expression WNL   Irregular Movement WNL     Soft Palate  Symmetry WNL   Structure WNL     Hard Palate  Structure WNL     a. Oral Mechanism during Sustained Postures   Labial Retraction   Symmetry WNL   Range WNL   Tone WNL   Strength WNL     Labial Protrusion  Range WNL   Tone WNL   Strength Reduced     Labial Compression  Strength (Upper R) WNL   Strength (Upper L) WNL   Strength (Lower R) Reduced   Strength (Lower L) Reduced     Lingual Protrusion  Symmetry WNL   Range WNL   Tone WNL   Tremor WNL   Strength Reduced     Lingual Elevation to Alveolar Ridge   Range WNL   Symmetry  WNL     Mandible Depression  Symmetry WNL   Range WNL   Jaw Clonus WNL   Strength WNL     Mandible Elevation  Symmetry WNL   Range WNL   Strength WNL     Face: Raising Eyebrows  Symmetry WNL   Range WNL   Forehead Wrinkle WNL     Velopharyngeal Function: Cheek Puffing   Symmetry WNL   Range Reduced-Cannot maintain air in mouth secondary to labial weakness    Tone WNL   Strength Reduced- secondary to labial weakness        Oral Mechanism during Movement   Labial Protrusion and Lateralization   Rate Slow   Rhythm Irregular     Lingual Lateralization (External)   Rate Slow   Rhythm Irregular   Range WNL     Lingual Lateralization (Internal)  Rate Slow   Rhythm Irregular   Range WNL     Circular Range of Motion (External)   Rate WNL   Rhythm WNL   Range WNL     Circular Range of Motion (Internal)  Rate Slow   Rhythm Irregular   Range Reduced     Velopharyngeal Function: Sustained /a/  Velum Range Reduced   Pharyngeal Wall Range Reduced     Velopharyngeal Function: Short Repeating /a/  Velum Range Reduced   Pharyngeal Wall Range Reduced     b. Gross Sensation  Sensation  Face: Upper L WNL    Upper R WNL    Lower L WNL    Lower R WNL   Lips: Upper L WNL    Upper R WNL    Lower L WNL    Lower R WNL         2. Alternating Motion Rates (AMRs) and Sequential Motion Rates (SMRs)  SMRs /pu/  Rate WNL   Rhythm Irregular   Accuracy WNL   Norm 5.0-7.1 Tete/Sec Below norm     SMRs /tu/  Rate WNL   Rhythm Irregular   Accuracy WNL   Norm 4.8-7.1 Tete/Sec WNL     SMRs /ku/  Rate Slow   Rhythm Irregular   Accuracy Inaccurate   Norm 4.4-7.5 Tete/Sec WNL     AMRs /putuku/  Rate Slow   Rhythm WNL   Accuracy WNL   Norm 3.6-7.5 Tete/Sec Below norm     3. Perceptual Ratings  Respiratory Features: None present   Respiratory/Phonatory Features: Reduced loudness  Phonatory Features: Monopitch  Phonatory Quality: none present  Resonatory Features: none present  Articulatory Features: imprecise consonants and irregular articulatory  breakdowns  Prosodic Features: Normal Rate  Other Features: none present    4. Diagnosis   OVERALL IMPRESSION:   Patient presents with Mild-moderate Mixed Flaccid-Ataxic dysarthria characterized by reduced loudness, mono-pitch, imprecise consonants, and irregular articulatory breakdowns.     Clinical Swallow Exam/ Cranial Nerve Exam:    Cranial Nerve Examination  Cranial Nerve 5: Trigeminal Nerve  Motor Jaw Posture at rest: Closed  Mandible Elevation/Depression: WFL  Mandible lateralization: WFL  Abnormal movement: absent Interpretation: Intact bilaterally    Sensory Forehead: WFL  Cheek: WFL  Jaw: WFL  Facial Pain: None noted Interpretation: Intact bilaterally      Cranial Nerve 7: Facial Nerve  Motor Facial Symmetry: mask-like expression  Wrinkle Forehead: WFL  Close eyes tightly: WFL  Labial Protrusion: Decreased strength  Labial Retraction: Decreased strength  Buccal Strength with Labial Seal: Reduced  Abnormal movement: absent Interpretation: Deficits noted   Sensory Formal testing not completed.       Cranial Nerves IX and X: Glossopharyngeal and Vagus Nerves  Motor Palatal Symmetry (Rest): WNL  Palatal Symmetry (Movement): WNL  Cough: Perceptually weak  Resonance: Normal  Abnormal movement: absent Interpretation: Intact bilaterally      Cranial Nerve XII: Hypoglossal Nerve  Motor Tongue at rest: WNL  Lingual Protrusion: WNL  Lingual Protrusion against Resistance: WNL  Lingual Lateralization: WNL  Abnormal movement: absent Interpretation: Intact bilaterally      Other information:   Volitional Swallow: Able to palpate laryngeal rise   Mucosal Quality: No abnormal findings   Dentition: Good condition for speech and mastication     Predisposing dysphagia risk factors: Multiple System Atrophy  Clinical signs of possible chronic dysphagia: none present  Precipitating dysphagia risk factors: nonepresent    EAT-10 Score:    Eating Assessment Tool (EAT-10) is a questionnaire given to the patient to  her  swallowing function.     Key: 0= no problem; 4= severe problem  1. My swallowing problem has caused me to lose weight. - 0  2. My swallowing problem interferes with my ability to go out for meals. - 0  3. Swallowing liquids takes extra effort. - 1  4. Swallowing solids takes extra effort. - 0  5. Swallowing pills takes extra effort. - 1  6. Swallowing is painful. - 0  7. The pleasure of eating is affected by my swallowing. - 0  8. When I swallow food sticks in my throat. - 1  9. I cough when I eat. - 1  10. Swallowing is stressful. - 0    Nirali ranked her swallowing function as a 4/40     Interpretation: Score of greater than 3 is indicative of a swallowing disorder (Autumn et al., 2008); higher scores correlate with increased penetration-aspiration  scale scores, and scores greater than 15 results in the patient being 2.2 times more likely to aspirate (Fan et al., 2015)    References:   MAEGAN Leyva., JUSTIN Matias., TERESA Hinds., REESE Chang., Germania, HIRAL N., REESE Silva, & RADHA Doyle (2008). Validity and reliability of the Eating Assessment Tool (EAT-10). Annals of Otology, Rhinology & Laryngology, 117(12), 919-924. https://doi.org/10.1177/[email or web address]  JUSTIN Chavira., KEV Cummings., REESE Castelan., Giuliana, M. A., & Autumn, P. REJI. (2015). The ability of the 10-item eating assessment tool (EAT-10) to predict aspiration risk in persons with dysphagia. Annals of Otology, Rhinology & Laryngology, 124(5), 351-354. https://doi.org/10.1177/[email or web address]    Reflux Severity Index:  The Reflux Severity Index (RSI) was completed to assess the possible presence and/or severity of laryngopharyngeal reflux symptoms and any relationship between this condition and the patient's dysphagia. A score of greater than or equal to 15 may indicate laryngopharyngeal reflux. Patient completed with a result of  15/45.     PO Trials:   Patient presented with:   THIN:-self regulated thin liquid via straw and self regulated  thin liquid via cup  PUREE:- tsp bites of applesauce x2  DENTAL SOFT:- tsp bites of peaches x2  SOLID: -bite of hcinedu doone cookie x2    *Thicken liquids were not used in this assessment. Sven (2018) reported that thickened liquids have no sound evidence as reducing risk of pneumonia in patients with dysphagia and can cause harm by increasing risk of dehydration. It also presents an increased risk of UTI, electrolyte imbalance, constipation, fecal impaction, cognitive impairment, functional decline, and even death (Kenny, 2002; Delia, 2016).  This supports the assertion that we should confirm a patient requires thickened liquids with an instrumental swallow study prior to recommending them.    Interpretation: The patient had no anterior loss of the bolus with complete closure of the lips around the straw and cup rim. Joon residue remained in the oral cavity following the swallow. Patient with overt clinical sign/symptoms of aspiration on thin x1 (indicated by throat clear). Patient presents with a possibly inefficient swallow as indicated by multiple swallows per bolus. Contributing risk factors for dysphagia include deficits in respiratory-swallow coordination. Patient with increased risk for silent aspiration given potential sensory deficits related to Multiple System Atrophy.    oropharyngeal dysphagia suspected based on clinical bedside evaluation, likely Chronic related to Multiple System Atrophy.  An instrumental swallow study via Fiberoptic Endoscopic Evaluation of the Swallow (FEES) [as patient would like to avoid the hospital if possible] recommended to visualize oropharyngeal swallow function, determine least restrictive diet and appropriate treatment plan. This also establsihs a baseline study which may be referenced in the future. Given prolonged wait time for outpatient instrumental studies, patient should continue current diet prior to instrumental study.    Reference:   American  Speech-Language-Hearing Association. (n.d.b). Adult dysphagia. (Practice Portal). https://www.karthik.org/practice-portal/clinical-topics/adult-dysphagia/  YAW Machuca (2018). Use of modified diets to prevent aspiration in oropharyngeal dysphagia: Is current practice justified?. BMC Geriatrics, 18(1), 1-10.  YAW Cox., MARCELLUS Jeffries, MAEGAN Hassan, & KONSTANTIN Heck (2002). Predictors of aspiration pneumonia in nursing home residents.  Dysphagia, 17(4), 298-307.  MARCELLUS Lin (2016). Best practices for dehydration prevention. Perspectives of the [facility] Special Interest Groups - SIG 13, 1(2), 72- 80.    Treatment   Total Treatment Time Separate from Evaluation: n/a   no treatment performed secondary to time to complete evaluation.    Education: Plan of Care, role of SLP in care, course of medical disease affect on therapy diagnosis  and scheduling/ cancellation policy were discussed with patient. Patient expressed understanding.     Home Program: not yet established   Assessment     Nirlai presents to [facility] status post medical diagnosis of Multiple System Atrophy.  Demonstrates impairments including limitations as described in the problem list. She presents with Mild-moderate Mixed Flaccid-Ataxic dysarthria characterized by reduced loudness, mono-pitch, imprecise consonants, and irregular articulatory breakdowns. Signs of oropharyngeal dysphagia and know trajectory of diagnosis warrant an instrumental swallow assessment. Patient would prefer a Fiberoptic Endoscopic Evaluation of the Swallow (FEES).  Positive prognostic factors include awareness and medical backgroud of the patient. Negative prognostic factors include progressive nature of this disease. Barriers to therapy include co-pay\ Patient will benefit from skilled, outpatient neurological rehabilitation speech therapy.    Rehab Potential: fair  Pt's spiritual, cultural, and educational needs considered and patient agreeable to plan of care and  goals.    Short Term Goals (4 weeks):   1. Patient will utilize clear speech strategies independently.  2. Patient will complete Fiberoptic Endoscopic Evaluation of the Swallow (FEES).    Long Term Goals (5 weeks):   1.  She  will maintain adequate hydration/nutrition with optimum safety and efficiency of swallowing function on PO intake without overt signs and symptoms of aspiration for IDDSI 0/7 diet level.   2. She  will develop functional and intelligible speech and utilize compensatory strategies through the use of adequate labial and lingual function, increased articulatory precision and speech prosody.     Plan     Plan of Care Certification Period: 2/21/2022 to 4/1/2022    Recommended Treatment Plan:  Patient will participate in the [facility] rehabilitation program for speech therapy 1 times per week to address her Motor Speech and Swallow deficits, to educate patient and their family, and to participate in a home exercise program.    Other Recommendations:    Fiberoptic Endoscopic Evaluation of the Swallow (FEES)    Therapist's Name:   Chiquis Carlos CCC-SLP   2/21/2022     I CERTIFY THE NEED FOR THESE SERVICES FURNISHED UNDER THIS PLAN OF TREATMENT AND WHILE UNDER MY CARE    Physician Name: _______________________________    Physician Signature: ____________________________

## 2022-02-21 NOTE — Clinical Note
Good afternoon,   I believe this patient would benefit from a Fiberoptic Endoscopic Evaluation of the Swallow (FEES) to assess swallowing physiology following evaluation. Please place the ambulatory referral to speech therapy, clinical swallow, and Fiberoptic Endoscopic Evaluation of the Swallow (FEES) if you agree.   I appreciate your time in coordination of care for this patient.  Chiquis Carlos M.A. CCC-SLP, CBIS Speech Language Pathologist Certified Brain Injury Specialist 2/23/2022

## 2022-02-24 ENCOUNTER — TELEPHONE (OUTPATIENT)
Dept: NEUROLOGY | Facility: CLINIC | Age: 57
End: 2022-02-24
Payer: COMMERCIAL

## 2022-02-24 DIAGNOSIS — R13.10 DYSPHAGIA, UNSPECIFIED TYPE: Primary | ICD-10-CM

## 2022-02-24 NOTE — TELEPHONE ENCOUNTER
Verbal Orders placed per JS.  Fiberoptic Endoscopic Evaluation of the Swallow (FEES) to assess swallowing physiology following evaluation. Ambulatory referral to speech therapy, clinical swallow, and Fiberoptic Endoscopic Evaluation of the Swallow (FEES)

## 2022-02-28 ENCOUNTER — PATIENT MESSAGE (OUTPATIENT)
Dept: NEUROLOGY | Facility: CLINIC | Age: 57
End: 2022-02-28
Payer: COMMERCIAL

## 2022-03-02 ENCOUNTER — PATIENT MESSAGE (OUTPATIENT)
Dept: NEUROLOGY | Facility: CLINIC | Age: 57
End: 2022-03-02
Payer: COMMERCIAL

## 2022-03-02 RX ORDER — MIDODRINE HYDROCHLORIDE 2.5 MG/1
2.5 TABLET ORAL 3 TIMES DAILY
Qty: 90 TABLET | Refills: 11 | Status: SHIPPED | OUTPATIENT
Start: 2022-03-02 | End: 2022-05-16

## 2022-03-11 ENCOUNTER — PATIENT MESSAGE (OUTPATIENT)
Dept: NEUROLOGY | Facility: CLINIC | Age: 57
End: 2022-03-11
Payer: COMMERCIAL

## 2022-03-14 ENCOUNTER — TELEPHONE (OUTPATIENT)
Dept: NEUROLOGY | Facility: CLINIC | Age: 57
End: 2022-03-14
Payer: COMMERCIAL

## 2022-03-14 NOTE — TELEPHONE ENCOUNTER
KITTY worked with Dr. Polo to complete [employer] paperwork. SW contacted insurer to confirm that paperwork was received.

## 2022-03-30 ENCOUNTER — CLINICAL SUPPORT (OUTPATIENT)
Dept: REHABILITATION | Facility: HOSPITAL | Age: 57
End: 2022-03-30
Attending: PSYCHIATRY & NEUROLOGY
Payer: COMMERCIAL

## 2022-03-30 DIAGNOSIS — R13.10 DYSPHAGIA, UNSPECIFIED TYPE: ICD-10-CM

## 2022-03-30 DIAGNOSIS — R47.1 DYSARTHRIA: Primary | ICD-10-CM

## 2022-03-30 PROCEDURE — 92612 ENDOSCOPY SWALLOW (FEES) VID: CPT | Mod: PN

## 2022-03-30 NOTE — PROGRESS NOTES
See Fiberoptic Endoscopic Evaluation of the Swallow (FEES)  results in Plan of Care.     Images:    Initial view:      Penetration:      Final View:        Chiquis Carlos M.A. CCC-SLP, CBIS  Speech Language Pathologist  Certified Brain Injury Specialist  3/30/2022

## 2022-03-30 NOTE — PLAN OF CARE
Outpatient Neurological Rehabilitation  Fiberoptic Endoscopic Evaluation of Swallowing (FEES)    Date: 3/30/2022     Name: Nirali Kirkpatrick   MRN: [id number]    Therapy Diagnosis:   Encounter Diagnoses   Name Primary?    Dysphagia, unspecified type     Dysarthria Yes      Physician: Ondina Polo MD  Physician Orders: Ambulatory Referral to Speech Therapy-FEES  Order Date: 2/24/2022   Medical Diagnosis from Referral: dysphagia unspecified    Visit #/Visits authorized: 1/ 1  Date of Evaluation:  3/30/2022   Insurance Authorization Period: 2/24/2022 to 224/2023   Plan of Care Expiration: Evaluation Only    Time In: 0945  Time Out: 1015    Procedure Min.   Flexible fiberoptic endoscopic evaluation of  swallowing by cine or video recording (CPT 55198)  30     Precautions:Standard and Fall  Subjective   History of Current Condition:  Nirali Kirkpatrick was referred by Dr. Polo for a FEES (CPT 93643) to objectively assess anatomy and physiology of the pharyngeal swallow, rule out silent aspiration, determine the safest possible diet, and examine the effectiveness of compensatory strategies. Patient's current nutritional avenue is oral. Patient is currently on a thin liquid diet consistency; regular food diet consistency and presents with baseline study and feeling as though her neck is tight when swallowing.    Past Medical History: Nirali Kirkpatrick  has a past medical history of Depression and Hypertension.  Nirali Kirkpatrick  has a past surgical history that includes Cholecystectomy; Gastric sleeve (2013); tubligation (2004); Fluoroscopic urodynamic study (N/A, 6/15/2021); Cystoscopy (N/A, 6/15/2021); Insertion of midurethral sling (N/A, 7/13/2021); and Cystoscopy (N/A, 7/13/2021).  Medical Hx and Allergies:  Nirali has a current medication list which includes the following prescription(s): amlodipine, b complex vitamins, clonazepam, cyanocobalamin, desvenlafaxine succinate, midodrine, multivitamin, and  tolterodine. Review of patient's allergies indicates:  No Known Allergies  Imaging: No Imaging    Pneumonia History: No  Previous MBSS:  No  Previous FEES:   No  Prior Therapy:  Previous evaluation  Pain:   0/10  Pain Location / Description: no pain indicted throughout the session  Patient's Therapy Goals:  Evaluation of the swallow  Objective     Procedure: A flexible fiberoptic nasoendoscope was passed transnasally to view the nasopharynx, oropharynx, hypopharynx, and larynx. 18 swallow trials using 1/2 teaspoon to 3 ounce amounts of real foods of thin liquid, nectar-thick liquid, puree, soft-mechanical, and solid consistencies were video recorded and analyzed using transnasal endoscopy.   Scope Time: 9 minutes and 19 seconds     Results revealed:     Nasopharyngoscopic Findings:    Velopharyngeal function: WFL   Anatomic Findings: WNL    Pharyngoscopic and laryngoscopic findings:    Secretions: WNL   Vocal Fold Motion: WNL   Anatomic Findings: WNL    Oral phase of swallow observed in conjunction with administration of PO trials throughout assessment. Of note, prolonged mastication and mild oral residue present post swallow    Thin Liquids:   Mode and volume administered: 1/2 teaspoon x1, full teaspoon x2   Vallecular Residue: None present   Pyriform Sinus Residue: None present   Other Residue: None present   Rosenbeck's 8-Point Penetration-Aspiration Scale: (1) Material does not enter the airway    Nectar/Mildly Thick Liquids:   Mode and volume administered: 1/2 teaspoon x1, full teaspoon x2   Vallecular Residue: None present   Pyriform Sinus Residue: None present   Other Residue: None present   Rosenbeck's 8-Point Penetration-Aspiration Scale: (1) Material does not enter the airway, (2) Material enters the airway, remains above the vocal folds, and is ejected from the airway and (4) Material enters the airway, contacts the vocal folds, and is ejected from the airway  o Best: (1) Material does not  enter the airway  - 1/3 trials  o Worst: (4) Material enters the airway, contacts the vocal folds, and is ejected from the airway  - 1/3 trials, penetration occurred during the swallow over the rim of the epiglottis  Puree:   Mode and volume administered: 1/2 teaspoon x1, full teaspoon x2, heaping teaspoon x1   Vallecular Residue: None present   Pyriform Sinus Residue: None present   Other Residue: None present   Rosenbeck's 8-Point Penetration-Aspiration Scale: (1) Material does not enter the airway    Mixed Consistency Bolus:   Mode and volume administered: Peaches in liquid x3   Vallecular Residue: None present   Pyriform Sinus Residue: None present   Other Residue: None present   Rosenbeck's 8-Point Penetration-Aspiration Scale: (1) Material does not enter the airway and (2) Material enters the airway, remains above the vocal folds, and is ejected from the airway  o Best: (1) Material does not enter the airway  - 1/3 trials  o Worst: (2) Material enters the airway, remains above the vocal folds, and is ejected from the airway  - 2/3 trials, penetration occurred during the swallow over the rim of the epiglottis   Regular (IDDSI 7):   Mode and volume administered: cookie x2 bites   Vallecular Residue: none to trace Bilaterally   Pyriform Sinus Residue: None present   Other Residue: None present   Rosenbeck's 8-Point Penetration-Aspiration Scale: (1) Material does not enter the airway    Thin Liquids via cup:   Mode and volume administered: self-regulated cup sip x1   Vallecular Residue: None present   Pyriform Sinus Residue: None present   Other Residue: None present   Rosenbeck's 8-Point Penetration-Aspiration Scale: (2) Material enters the airway, remains above the vocal folds, and is ejected from the airway  o (2) Material enters the airway, remains above the vocal folds, and is ejected from the airway  - Penetration occurred during the swallow over the rim of the epiglottis     Thin Liquids  via straw:   Mode and volume administered: self-regulated cup sip x2   Vallecular Residue: None present   Pyriform Sinus Residue: None present   Other Residue: None present   Rosenbeck's 8-Point Penetration-Aspiration Scale: (2) Material enters the airway, remains above the vocal folds, and is ejected from the airway  o (2) Material enters the airway, remains above the vocal folds, and is ejected from the airway  - 2/2 trials, penetration occurred during the swallow over the rim of the epiglottis    Images: See associated progress note.    Recommend MBSS: no    Treatment   Treatment Time In: n/a  Treatment Time Out: n/a  Total Treatment Time: n/a  No treatment performed 2/2 time to administer evaluation    Education: Plan of Care, role of SLP in care and results to be convey via phone within 48 hours Patient and family members expressed understanding of all discussed.     Assessment   Nirali is a 56 y.o. female referred to outpatient Speech Therapy with a medical diagnosis of dysphagia. Results of this FEES revealted that the pt presents with mild oropharyngeal dysphagia as defined by the dysphagia outcome and severity scale (adapted for FEES by MATTI Colon, [facility], [facility] from [place] et al 1999). Pharyngoscopic and laryngoscopic findings revealed  timely initiation of the swallow, complete tongue base retraction, complete epiglottic movement, reduced pharyngeal contraction, adequate laryngeal vestibule closure, and adequate pharyngoesophageal segment opening.     Recommend IDDSI level thin 0 liquid diet consistency; IDDSI level regular 7 food diet consistency. Pt appears to be at low risk for aspiration related PNA 2/2 to no aspiration, ambulatory status, and independent for oral care. Demonstrates impairments including limitations as described in the problem list.    Pt's spiritual, cultural and educational needs considered and pt agreeable to plan of care and goals.    Plan   Plan of Care  Certification: evaluation only    Recommended Treatment Plan: Skilled speech therapy intervention services are not warranted at this time as this dysphagia is likely secondary to degenerative disease where there is not evidence of dysphagia treatments. This dysphagia will likely worsen over time secondary to the degenerative nature of the disease. (HIRAL Freedman, ANNLAISA Rodriguez, VERNON Oliveira, ANNALISA Gilbert, HIRAL Whittaker, DINO Manriquez, Andrew, PDione CHAVEZ., MAEGAN Ceron, MICHELLE Barajas, ASIF Reynolds, ANNALISA Chow, HIRAL Mobley., HIRAL Hobson, PAZ Campos., DINO Alfonso, Hill, KDione P., GUICHO Rosenthal., KEV Esposito., SUDHAKAR Pradhan., RUDDY Ordonez.,  ROSA MARIA Dia. (2021). Dysphagia in multiple system atrophy consensus statement on diagnosis, prognosis and treatment. Parkinsonism & related disorders, 86, 124-132. [email or web address])    Other Recommendations:   - Aggressive oral care at least twice daily (morning and bedtime) is strongly recommended to reduce bacteria on oropharyngeal surfaces which may increase the risk of nosocomial infections, including pneumonia.   - Monitor for any signs/symptoms of aspiration (such as wet/gurgly voice that does not clear with coughing, inability to make any voice sounds, any persistent coughing with oral intake, otherwise unexplained fever, unexplained increased or new difficulty or discomfort breathing, unexplained increase in sleepiness/lethargy/significant fatigue, unexplained increase or new onset confusion or change in cognitive functioning, or any other unexplained change in health or well-being that could be related to swallowing).  - Risk Management: use good oral hygiene , sit upright for all PO intake and increase physical mobility as tolerated  -Follow-up exam: should be completed if changes within swallow function are noted by the patient or if concerns for aspiration are present.    Therapist's Name:   Chiquis Carlos CCC-SLP     Date: 3/30/2022

## 2022-04-07 NOTE — TELEPHONE ENCOUNTER
Attempted call back to [place].   Hung up due to extended wait time.   The patient's assessment was reviewed with Dr. Brown and a collaborative plan of care was established.

## 2022-04-27 ENCOUNTER — OFFICE VISIT (OUTPATIENT)
Dept: UROLOGY | Facility: CLINIC | Age: 57
End: 2022-04-27
Payer: COMMERCIAL

## 2022-04-27 VITALS
HEIGHT: 66 IN | SYSTOLIC BLOOD PRESSURE: 114 MMHG | BODY MASS INDEX: 32.92 KG/M2 | DIASTOLIC BLOOD PRESSURE: 78 MMHG | WEIGHT: 204.81 LBS | HEART RATE: 87 BPM

## 2022-04-27 DIAGNOSIS — N39.46 MIXED INCONTINENCE: ICD-10-CM

## 2022-04-27 DIAGNOSIS — N31.9 NEUROGENIC BLADDER: ICD-10-CM

## 2022-04-27 DIAGNOSIS — Z98.890 STATUS POST UROLOGICAL SURGERY: Primary | ICD-10-CM

## 2022-04-27 PROCEDURE — 3078F PR MOST RECENT DIASTOLIC BLOOD PRESSURE < 80 MM HG: ICD-10-PCS | Mod: CPTII,S$GLB,, | Performed by: UROLOGY

## 2022-04-27 PROCEDURE — 3078F DIAST BP <80 MM HG: CPT | Mod: CPTII,S$GLB,, | Performed by: UROLOGY

## 2022-04-27 PROCEDURE — 99214 PR OFFICE/OUTPT VISIT, EST, LEVL IV, 30-39 MIN: ICD-10-PCS | Mod: S$GLB,,, | Performed by: UROLOGY

## 2022-04-27 PROCEDURE — 3074F SYST BP LT 130 MM HG: CPT | Mod: CPTII,S$GLB,, | Performed by: UROLOGY

## 2022-04-27 PROCEDURE — 3074F PR MOST RECENT SYSTOLIC BLOOD PRESSURE < 130 MM HG: ICD-10-PCS | Mod: CPTII,S$GLB,, | Performed by: UROLOGY

## 2022-04-27 PROCEDURE — 3008F PR BODY MASS INDEX (BMI) DOCUMENTED: ICD-10-PCS | Mod: CPTII,S$GLB,, | Performed by: UROLOGY

## 2022-04-27 PROCEDURE — 3008F BODY MASS INDEX DOCD: CPT | Mod: CPTII,S$GLB,, | Performed by: UROLOGY

## 2022-04-27 PROCEDURE — 99999 PR PBB SHADOW E&M-EST. PATIENT-LVL III: CPT | Mod: PBBFAC,,, | Performed by: UROLOGY

## 2022-04-27 PROCEDURE — 1159F PR MEDICATION LIST DOCUMENTED IN MEDICAL RECORD: ICD-10-PCS | Mod: CPTII,S$GLB,, | Performed by: UROLOGY

## 2022-04-27 PROCEDURE — 99999 PR PBB SHADOW E&M-EST. PATIENT-LVL III: ICD-10-PCS | Mod: PBBFAC,,, | Performed by: UROLOGY

## 2022-04-27 PROCEDURE — 1159F MED LIST DOCD IN RCRD: CPT | Mod: CPTII,S$GLB,, | Performed by: UROLOGY

## 2022-04-27 PROCEDURE — 99214 OFFICE O/P EST MOD 30 MIN: CPT | Mod: S$GLB,,, | Performed by: UROLOGY

## 2022-04-27 NOTE — PROGRESS NOTES
CHIEF COMPLAINT:    Mrs. Kirkpatrick is a 56 y.o. female presenting for a follow up after TOS and cystoscopy on 7/13/2021  PRESENTING ILLNESS:    Nirali Kirkpatrick is a 56 y.o. female who returns for follow up.  She states she is doing relatively well, however, she has nocturia x 1-2.  She is on Detrol LA 4 mg and was taking it in the morning but switched to taking it in the evening due to the nocturia.  She states that she drinks 3-4 16.9 oz bottles of water daily, in addition to the 2-3 cups of coffee.  She has no dyspareunia and her  reports no partner pain. She denies any vaginal discharge.     She is accompanied by her  today.      REVIEW OF SYSTEMS:    Review of Systems   Constitutional: Negative.    HENT: Negative.    Eyes: Negative.    Respiratory: Negative.    Cardiovascular: Negative.    Gastrointestinal: Negative.    Genitourinary: Positive for urgency.        Nocturia   Musculoskeletal:        Uses a wheel chair.    Skin: Negative.    Neurological:        Multi systems atrophy   Endo/Heme/Allergies: Negative.    Psychiatric/Behavioral: Negative.        PATIENT HISTORY:    Past Medical History:   Diagnosis Date    Depression     Hypertension        Past Surgical History:   Procedure Laterality Date    CHOLECYSTECTOMY      CYSTOSCOPY N/A 6/15/2021    Procedure: CYSTOSCOPY;  Surgeon: Susan Mcgee MD;  Location: [facility] OR [address] [place] [address];  Service: Urology;  Laterality: N/A;    CYSTOSCOPY N/A 7/13/2021    Procedure: CYSTOSCOPY;  Surgeon: Susan Mcgee MD;  Location: [address];  Service: Urology;  Laterality: N/A;    FLUOROSCOPIC URODYNAMIC STUDY N/A 6/15/2021    Procedure: URODYNAMIC STUDY, FLUOROSCOPIC;  Surgeon: Susan Mcgee MD;  Location: [facility] OR [address] [place] [address];  Service: Urology;  Laterality: N/A;  90 MINUTES     Gastric sleeve  2013    INSERTION OF MIDURETHRAL SLING N/A 7/13/2021    Procedure: SLING, MIDURETHRAL;  Surgeon: Susan Mcgee MD;  Location: [facility] OR [address] [place] [address];  Service:  Urology;  Laterality: N/A;  1 HOUR     tubligation  2004       Family History   Problem Relation Age of Onset    Stroke Mother     Hypertension Mother     Heart disease Mother     Heart disease Father     Lung disease Father     Hyperlipidemia Father     Hypertension Father        Social History     Socioeconomic History    Marital status:    Tobacco Use    Smoking status: Never Smoker    Smokeless tobacco: Never Used   Substance and Sexual Activity    Alcohol use: Not Currently    Drug use: Never   Social History Narrative    ** Merged History Encounter **            Allergies:  Patient has no known allergies.    Medications:  Outpatient Encounter Medications as of 4/27/2022   Medication Sig Dispense Refill    b complex vitamins tablet Take 1 tablet by mouth once daily.      clonazePAM (KLONOPIN) 0.5 MG tablet Take 1 tablet by mouth twice daily as needed for anxiety 30 tablet 0    cyanocobalamin 1,000 mcg/mL injection 1,000 mcg every 30 days.       desvenlafaxine succinate (PRISTIQ) 100 MG Tb24 100 mg every evening.       midodrine (PROAMATINE) 2.5 MG Tab Take 1 tablet (2.5 mg total) by mouth 3 (three) times daily. 90 tablet 11    multivitamin capsule Take 1 capsule by mouth once daily.      tolterodine (DETROL LA) 4 MG 24 hr capsule Take 1 capsule (4 mg total) by mouth once daily. (Patient taking differently: Take 4 mg by mouth nightly.) 30 capsule 11    amLODIPine (NORVASC) 5 MG tablet Take 5 mg by mouth every evening.        No facility-administered encounter medications on file as of 4/27/2022.         PHYSICAL EXAMINATION:    The patient generally appears in good health, is appropriately interactive, and is in no apparent distress.    Skin: No lesions.    Mental: Cooperative with normal affect.    Neuro: Grossly intact.    HEENT: Normal. No evidence of lymphadenopathy.    Chest:  normal inspiratory effort.    Extremities: No clubbing, cyanosis, or edema      LABS:    Lab Results    Component Value Date    BUN 20 07/14/2021    CREATININE 0.7 07/14/2021     UA 1.020, PH 5, ++ leuk, tr protein, 250 blood, otherwise, negative     IMPRESSION:    Encounter Diagnoses   Name Primary?    Status post urological surgery Yes    Mixed incontinence     Neurogenic bladder        PLAN:    1.  Discussed keeping the fluids to about 3 bottles of water daily, in addition to the coffee.  Would limit coffee intake to 1-2 cups in the am.  Decrease fluid intake 4 hours prior to bedtime.  2.  If out in the sun and hot weather, OK to drink according to how thirsty she is to avoid dehydration  3.  Follow up in 1 year.

## 2022-05-16 ENCOUNTER — OFFICE VISIT (OUTPATIENT)
Dept: NEUROLOGY | Facility: CLINIC | Age: 57
End: 2022-05-16
Payer: COMMERCIAL

## 2022-05-16 DIAGNOSIS — G90.3 MULTIPLE SYSTEM ATROPHY: ICD-10-CM

## 2022-05-16 DIAGNOSIS — G23.8 MULTIPLE SYSTEM ATROPHY: ICD-10-CM

## 2022-05-16 DIAGNOSIS — I95.1 ORTHOSTASIS: ICD-10-CM

## 2022-05-16 PROCEDURE — 99214 PR OFFICE/OUTPT VISIT, EST, LEVL IV, 30-39 MIN: ICD-10-PCS | Mod: 95,,, | Performed by: PSYCHIATRY & NEUROLOGY

## 2022-05-16 PROCEDURE — 99214 OFFICE O/P EST MOD 30 MIN: CPT | Mod: 95,,, | Performed by: PSYCHIATRY & NEUROLOGY

## 2022-05-16 RX ORDER — MIDODRINE HYDROCHLORIDE 2.5 MG/1
TABLET ORAL
Qty: 110 TABLET | Refills: 11 | Status: SHIPPED | OUTPATIENT
Start: 2022-05-16 | End: 2022-06-13 | Stop reason: SDUPTHER

## 2022-05-16 NOTE — ASSESSMENT & PLAN NOTE
Moderate progressive ataxia, worsening.  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  Dysautonomia which also fits.    Suggested continue exercising for balance  Suggetsed start hobbies for manual dexterity

## 2022-05-16 NOTE — PROGRESS NOTES
The patient location is: home  The chief complaint leading to consultation is: MSA    Visit type: audiovisual    Face to Face time with patient: 20mins  20 minutes of total time spent on the encounter, which includes face to face time and non-face to face time preparing to see the patient (eg, review of tests), Obtaining and/or reviewing separately obtained history, Documenting clinical information in the electronic or other health record, Independently interpreting results (not separately reported) and communicating results to the patient/family/caregiver, or Care coordination (not separately reported).       Each patient to whom he or she provides medical services by telemedicine is:  (1) informed of the relationship between the physician and patient and the respective role of any other health care provider with respect to management of the patient; and (2) notified that he or she may decline to receive medical services by telemedicine and may withdraw from such care at any time.    Notes:       [facility] Followup  PCP/Referring Provider: No referring provider defined for this encounter.  Date of Service: 5/16/2022    Chief Complaint: Ataxia    Interval Hx    Since last visit,   Still on carbidopa/levodopa 25/100mg 1 tab PO TID - she does not miss it  Dizzy on standing still but not as prominent    Bps do not increase past 130's  Still Stopped working due to falls    Dyarthria worse   Using a rolling walker - stopped PT  Falls weekly at least  Doing PT self guided    No stridor  No change to sweating  Does have REM sleep behavior which is advancing - not treated   does not hear gasping  Does have unexplained heart racing    Struggling to write  Can no longer do stairs.  Still having trouble writing checks   strength decreased   Speech is progressively slurred    Feels like memory is poor at times - trouble recalling names    Mild dyphagia    Workup  MRI brain showed decrease in [place]  "contour suggesting of MSA  PAGE scans showed normal uptake  EMG was normal 2019  CT C/AB/PEL was neg for malignancy    MELVIN 65 NL  TPO POS  Notes her son was diagnosed with PANDAS and "has funny eye movements" - poor fine motor skills as a kid    Does have REM sleep behavior issues for 3 months    PD Review of Symptoms:  Anosmia: None  Dysarthria/Hypophonia: None  Dysphagia/Sialorrhea: None  Hallucinations: None  Depression: None  Cognitive slowing: None  Urinary changes: None  Constipation: at times  Orthostasis: none  Falls: as above  Micrographia: none  Sleep issues:  -SUSAN: at times snores  -RBD: Talks and punches in her sleep  -Sleep Quality: good, wakes every 3 hours"    "PriorHPI: Nirali Kirkpatrick is a L HANDED 56 y.o. female with a medical issues significant for HTN, Anxiety, Vit B12 deficiency, who presents with ataxia for 1.5 years. She first noted 1.5 years ago her R foot was dragging and every 3-4 steps she would catch that toe. She noticed her R foot would catch on curbs. Going down steps is especially hard - she mis-steps often. She feels like she has progressively been imbalanced. Her first fall was a year ago. She has fallen more often since then. She falls twice a month now. Falls typically sideways. She holds onto her  for stability but does not use an assist device. Tends to have more ataxia after she's tired. She occasionally has a had tremor in her hands when starting IVs. She does at times feel clumsy with her hands. No spinning vertigo. No lightheadedness. No visual issues.    No double vision, eye drooping, head drop, dysphagia.  She was told her B12 was low normal Aug 2018, and has had 4 shots since    No dysarthria.    She saw a general neurologist  B6 - NL  B12 - 333    Brain MRI read as normal  Spine and Tspine 2018 - no central disc issues  Has not had an EMG    No fam hx ataxia, MS  Uncle has M. Gravis  Mother and brother have had tremors      Review of Systems:   Review of Systems "   Constitutional: Negative for fever.   HENT: Negative for congestion.    Eyes: Negative for double vision.   Respiratory: Negative for cough and shortness of breath.    Cardiovascular: Negative for chest pain and leg swelling.   Gastrointestinal: Negative for nausea.   Genitourinary: Negative for dysuria.   Musculoskeletal: Positive for falls.   Skin: Negative for rash.   Neurological: Positive for tremors. Negative for speech change and headaches.   Psychiatric/Behavioral: Negative for depression.         Current Medications:  Outpatient Encounter Medications as of 5/16/2022   Medication Sig Dispense Refill    b complex vitamins tablet Take 1 tablet by mouth once daily.      clonazePAM (KLONOPIN) 0.5 MG tablet Take 1 tablet by mouth twice daily as needed for anxiety 30 tablet 0    cyanocobalamin 1,000 mcg/mL injection 1,000 mcg every 30 days.       desvenlafaxine succinate (PRISTIQ) 100 MG Tb24 100 mg every evening.       midodrine (PROAMATINE) 2.5 MG Tab Suggested midodrine 5mgAM, 2.5 mid day an d 2.5mg  tablet 11    multivitamin capsule Take 1 capsule by mouth once daily.      tolterodine (DETROL LA) 4 MG 24 hr capsule Take 1 capsule (4 mg total) by mouth once daily. (Patient taking differently: Take 4 mg by mouth nightly.) 30 capsule 11    [DISCONTINUED] midodrine (PROAMATINE) 2.5 MG Tab Take 1 tablet (2.5 mg total) by mouth 3 (three) times daily. 90 tablet 11     No facility-administered encounter medications on file as of 5/16/2022.       Past Medical History:  HTN    Past Surgical History:  Gallbladder, gastric sleeve    Current Living Situation: home    Social:  Social History     Socioeconomic History    Marital status:    Tobacco Use    Smoking status: Never Smoker    Smokeless tobacco: Never Used   Substance and Sexual Activity    Alcohol use: Not Currently    Drug use: Never   Social History Narrative    ** Merged History Encounter **            Family History:  As  "above    PHYSICAL:  There were no vitals taken for this visit.    Physical Exam  Constitutional: Well-developed, well-nourished, appears stated age  Eyes: No scleral icterus  ENT: Moist oral mucosa  Cardiovascular: No lower extremity edema   Respiratory: No labored breathing   Skin: No rash   Hematologic: No bruising    Other: GI/ deferred   · Mental status: Alert and oriented to person, place, time, and situation;   · follows commands  · Speech: mod dysarthric), no aphasia  · Cranial nerves:            · CN II: Pupils mid-position and equal, not tested light or accommodation  · CN III, IV, VI: Extraocular movements full, no nystagmus visualized  · CN V: Not tested   · CN VII: Face strong and symmetric bilaterally   · CN VIII: Hearing intact to voice and conversation   · CN IX, X: Palate raises midline and symmetric   · CN XI: Strong shoulder shrug B/L  · CN XII: Tongue appears midline   · Motor: Normal bulk by appearance, no drift   · Sensory: Not tested    · Gait: Not tested  · Deep tendon reflexes: Not tested  · Movement/Coordination                    No hypophonic speech.                     No facial masking.  Mild bradykinesia.  Mild L hand resting tremor  FNF R>L intention tremor  No stridor                  Bradykinesia  ? Finger taps Finger flicks PEDRO Heel taps   Left 0 0 0 0   Right 1+ 0 0 0       Tremor Exam   Arms extended Arms in wing position, fingers almost touching Re-emergent Arms extended wrists extended Intention Resting Kinetic   Left ? ? ?+ ? ?++ ? ?   Right ? ? ? ? ?+ ? ?   Head tremor: No-No Yes-Yes NE        "General Medical Examination:  General: Good hygiene, appropriate appearance.  HEENT: Normocephalic, atraumatic.   Neck: Supple.   Chest: Unlabored breathing.   CV: Symmetric pulses.   Ext: No clubbing, cyanosis, or edema.     Mental Status:  Mood/Affect: Appropriate/congruent.  Level of consciousness: Awake, alert.  Orientation: Oriented to person, place, time and " "situation.  Language: Mod Dysarthria    Cranial nerves:  I: Not tested  II: PERRL, VFF to counting  III, IV, VI: EOMI with conjugate gaze and no nystagmus on end gaze - no SWJs  V: Facial sensation intact and symmetric over the bilateral V1-V3  VII: Facial muscle activation intact and symmetric over the bilateral upper and lower face  VIII: Hearing intact in the b/l ears and symmetrical to finger rub  IX, X, XII: TUP midline - no atrophy or fasiculations  X: SCMs and shoulder shrug full strength b/l and symmetric  Trouble with PA PA TA TA  Trouble with GAs and Naomy      Motor:   Cannot squeeze and hold 's hands   When lifting her knees she cannot overcome her  pushing it down  No stridor    Hyperreflexic 3+ at pattelae    ? Finger taps Finger flicks PEDRO Heel taps   Left nl nl nl nl   Right nl nl Incoordinated nl   Neck tone: nl  ? Arm Leg   Left nl nl   Right nl nl         Coordination:   -Finger to nose: intention tremor mild bilat  Past-points 1 inch R hand >L hand  Disdiatochokinesias R hand moderate  Disdiatochokinesias L hand mild  -Heel to shin: mild issues R foot  R hand spiral +++  L hand spiral ++    No resting or postural tremor    Gait:  -Arises from chair without use of hands.  -Casual gait is: wide based, somewhat stiff and staggering, circumducts, waves moderately L to R when she walks - mod ataxic   -Stride length: nl  -Arm Swing: decreased R  -Turning: wide  -Tandem gait: cannot  No foot drop"      Laboratory Data:  Results for RADHA HAHN (MRN [id number]) as of 6/3/2019 09:52   Ref. Range 5/2/2019 11:23   Folate Latest Ref Range: 4.0 - 24.0 ng/mL 10.7   Vitamin B-12 Latest Ref Range: 180 - 914 ng/L 525   Sodium Latest Ref Range: 136 - 145 mmol/L 141   Potassium Latest Ref Range: 3.5 - 5.1 mmol/L 3.7   Chloride Latest Ref Range: 95 - 110 mmol/L 106   CO2 Latest Ref Range: 23 - 29 mmol/L 26   Anion Gap Latest Ref Range: 8 - 16 mmol/L 9   BUN, Bld Latest Ref Range: 6 - 20 mg/dL 22 (H) "   Creatinine Latest Ref Range: 0.5 - 1.4 mg/dL 0.8   eGFR if non African American Latest Ref Range: >60 mL/min/1.73 m^2 >60.0   eGFR if African American Latest Ref Range: >60 mL/min/1.73 m^2 >60.0   Glucose Latest Ref Range: 70 - 110 mg/dL 92   Calcium Latest Ref Range: 8.7 - 10.5 mg/dL 9.8   Alkaline Phosphatase Latest Ref Range: 55 - 135 U/L 86   PROTEIN TOTAL Latest Ref Range: 6.0 - 8.4 g/dL 7.4   Albumin Latest Ref Range: 3.5 - 5.2 g/dL 4.2   BILIRUBIN TOTAL Latest Ref Range: 0.1 - 1.0 mg/dL 0.6   AST Latest Ref Range: 10 - 40 U/L 19   ALT Latest Ref Range: 10 - 44 U/L 16   Ammonia Latest Ref Range: 10 - 50 umol/L 26   Arsenic Latest Ref Range: 0 - 12 ng/mL <1   Thiamine Latest Ref Range: 38 - 122 ug/L 63   Vitamin E Latest Ref Range: 500 - 1800 ug/dL 1302   Glutamic Acid Decarb Ab Latest Ref Range: <=0.02 nmol/L 0.00   TSH Latest Ref Range: 0.400 - 4.000 uIU/mL 0.649   PTH Latest Ref Range: 9.0 - 77.0 pg/mL 103.0 (H)   Race Unknown Test Not Performed   AChR Binding Ab, Serum Latest Ref Range: <=0.02 nmol/L 0.00   AChR Ganglionic Neuronal Ab Latest Ref Range: <=0.02 nmol/L 0.00   CRMP-5 IgG Latest Ref Range: <1:240 titer Negative   Neuronal (V-G) K+ Channel Ab, Serum Latest Ref Range: <=0.02 nmol/L 0.00   NMO Interpretive Comments Unknown SEE BELOW   N-Type Calcium Channel Ab Latest Ref Range: <=0.03 nmol/L 0.00   P/Q Type Calcium Channel Ab Latest Ref Range: <=0.02 nmol/L 0.00   PAVAL AGNA-1, Serum Latest Ref Range: <1:240 titer Negative   PAVAL TOBY-1, Serum Latest Ref Range: <1:240 titer Negative   PAVAL TOBY-2, Serum Latest Ref Range: <1:240 titer Negative   PAVAL TOBY-3, Serum Latest Ref Range: <1:240 titer Negative   PAVAL reflex test added Unknown None.   PAVAL,  Amphiphysin Ab, Serum Latest Ref Range: <1:240 titer Negative   PAVAL, PCA-1, Serum Latest Ref Range: <1:240 titer Negative   PAVAL, PCA-2, Serum Latest Ref Range: <1:240 titer Negative   PAVAL, PCA-Tr, Serum Latest Ref Range: <1:240 titer  Negative   STRIATED MUSCLE AB Latest Ref Range: <1:120 titer Negative   Lead Latest Ref Range: 0.0 - 4.9 mcg/dL <1.0   Cadmium Latest Ref Range: 0.0 - 4.9 ng/mL 0.3   City Unknown Test Not Performed   County Unknown Test Not Performed   Guardian First Name Unknown Test Not Performed   Guardian Last Name Unknown Test Not Performed   Home Phone Unknown Test Not Performed   [place] Latest Ref Range: 0 - 9 ng/mL 2   State Unknown Test Not Performed   Street Address Unknown Test Not Performed   Venous/Capillary Unknown Test Not Performed   Zip Unknown Test Not Performed      Results for RADHA HAHN (MRN [id number]) as of 8/23/2019 10:10   Ref. Range 6/3/2019 10:27   Sed Rate Latest Ref Range: 0 - 36 mm/Hr 13   CRP Latest Ref Range: 0.0 - 8.2 mg/L 0.5   CPK Latest Ref Range: 20 - 180 U/L 55   CERULOPLASMIN Latest Ref Range: 15.0 - 45.0 mg/dL 33.0   Acetylchol Modul Ab Latest Units: % 0   AChR Binding Ab, Serum Latest Ref Range: <=0.02 nmol/L 0.00   MG Interpretive Comments Unknown SEE BELOW   MuSK Antibody Test Latest Ref Range: 0.00 - 0.02 nmol/L 0.00   STRIATED MUSCLE AB Latest Ref Range: <1:120 titer Negative   Aldolase Latest Ref Range: 1.2 - 7.6 U/L 3.5       Imaging:  Brain MRI w/o cerebellar degeneration or lesion - however poor quality MRI  Repeat brain MRI  FINDINGS:  MRI of the brain demonstrates no infarction, mass, hemorrhage, extra-axial collection, or other acute finding.  A skull base lesions are seen.  Following contrast administration, there is no abnormal enhancing lesion identified.  A small incidental developmental venous anomaly is seen in the right medial posterior thalamus.  Cspine w/o without central impingement    EMG  Summary   Nerve conduction study of bilateral lower extremities revealed normal antidromic sensory peak latencies, action potentials, and conduction velocities.   Motor peak latencies, amplitudes, and conduction velocities were normal. F-waves were normal.   Concentric needle  examination of selected muscles in bilateral lower extremities revealed no evidence of acute or chronic denervation.   Impression   This is a normal study.     Assessment//Plan:   Problem List Items Addressed This Visit        Neuro    Multiple system atrophy    Current Assessment & Plan     Moderate progressive ataxia, worsening.  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  Dysautonomia which also fits.    Suggested continue exercising for balance  Suggetsed start hobbies for manual dexterity                Cardiac/Vascular    Orthostasis    Current Assessment & Plan     Some dizziness on standing  Suggested try midodrine 5mg/2.5mg/2.5mg                     Ondina Polo MD, MS  [facility]  Department of Neurology  Movement Disorders

## 2022-05-31 ENCOUNTER — PATIENT MESSAGE (OUTPATIENT)
Dept: NEUROLOGY | Facility: CLINIC | Age: 57
End: 2022-05-31
Payer: COMMERCIAL

## 2022-06-13 RX ORDER — MIDODRINE HYDROCHLORIDE 2.5 MG/1
TABLET ORAL
Qty: 110 TABLET | Refills: 0 | Status: SHIPPED | OUTPATIENT
Start: 2022-06-13 | End: 2022-06-14

## 2022-06-29 ENCOUNTER — PATIENT MESSAGE (OUTPATIENT)
Dept: NEUROLOGY | Facility: CLINIC | Age: 57
End: 2022-06-29
Payer: COMMERCIAL

## 2022-07-01 RX ORDER — MIDODRINE HYDROCHLORIDE 5 MG/1
5 TABLET ORAL EVERY 8 HOURS
Qty: 90 TABLET | Refills: 0 | Status: SHIPPED | OUTPATIENT
Start: 2022-07-01 | End: 2022-08-10 | Stop reason: SDUPTHER

## 2022-07-12 ENCOUNTER — PATIENT MESSAGE (OUTPATIENT)
Dept: NEUROLOGY | Facility: CLINIC | Age: 57
End: 2022-07-12
Payer: COMMERCIAL

## 2022-07-26 ENCOUNTER — PATIENT MESSAGE (OUTPATIENT)
Dept: NEUROLOGY | Facility: CLINIC | Age: 57
End: 2022-07-26
Payer: COMMERCIAL

## 2022-07-27 ENCOUNTER — PATIENT MESSAGE (OUTPATIENT)
Dept: NEUROLOGY | Facility: CLINIC | Age: 57
End: 2022-07-27
Payer: COMMERCIAL

## 2022-08-10 DIAGNOSIS — N39.46 MIXED INCONTINENCE: ICD-10-CM

## 2022-08-10 RX ORDER — TOLTERODINE 4 MG/1
4 CAPSULE, EXTENDED RELEASE ORAL DAILY
Qty: 30 CAPSULE | Refills: 8 | Status: SHIPPED | OUTPATIENT
Start: 2022-08-10 | End: 2023-04-21

## 2022-09-21 ENCOUNTER — PATIENT MESSAGE (OUTPATIENT)
Dept: NEUROLOGY | Facility: CLINIC | Age: 57
End: 2022-09-21
Payer: COMMERCIAL

## 2022-09-22 ENCOUNTER — OFFICE VISIT (OUTPATIENT)
Dept: NEUROLOGY | Facility: CLINIC | Age: 57
End: 2022-09-22
Payer: COMMERCIAL

## 2022-09-22 ENCOUNTER — PATIENT MESSAGE (OUTPATIENT)
Dept: NEUROLOGY | Facility: CLINIC | Age: 57
End: 2022-09-22

## 2022-09-22 DIAGNOSIS — I95.1 ORTHOSTASIS: ICD-10-CM

## 2022-09-22 DIAGNOSIS — R27.0 ATAXIA: ICD-10-CM

## 2022-09-22 PROBLEM — G20.C PARKINSONISM: Status: RESOLVED | Noted: 2021-06-21 | Resolved: 2022-09-22

## 2022-09-22 PROCEDURE — 99215 OFFICE O/P EST HI 40 MIN: CPT | Mod: 95,,, | Performed by: PSYCHIATRY & NEUROLOGY

## 2022-09-22 PROCEDURE — 99215 PR OFFICE/OUTPT VISIT, EST, LEVL V, 40-54 MIN: ICD-10-PCS | Mod: 95,,, | Performed by: PSYCHIATRY & NEUROLOGY

## 2022-09-22 NOTE — PROGRESS NOTES
The patient location is: home  The chief complaint leading to consultation is: MSA    Visit type: audiovisual    Face to Face time with patient: 20mins  20 minutes of total time spent on the encounter, which includes face to face time and non-face to face time preparing to see the patient (eg, review of tests), Obtaining and/or reviewing separately obtained history, Documenting clinical information in the electronic or other health record, Independently interpreting results (not separately reported) and communicating results to the patient/family/caregiver, or Care coordination (not separately reported).       Each patient to whom he or she provides medical services by telemedicine is:  (1) informed of the relationship between the physician and patient and the respective role of any other health care provider with respect to management of the patient; and (2) notified that he or she may decline to receive medical services by telemedicine and may withdraw from such care at any time.    Notes:       MOVEMENT DISORDERS CLINIC Followup  PCP/Referring Provider: No referring provider defined for this encounter.  Date of Service: 9/22/2022    Chief Complaint: Ataxia    Interval Hx    Since last visit,   Had COVID and recovered    Stopped carbidopa/levodopa  Dizzy on standing still but not as prominent    Taking midodrine BID 5mg   Still dizzy on standing    Starting to fall more sometimes due to dizziness    Still Stopped working due to falls    Dyarthria worse   Using a rolling walker - stopped PT - can walk 3-4 feet  Falls weekly at least  Doing PT self guided    No stridor  No change to sweating  Does have REM sleep behavior which is advancing - not treated   does not hear gasping  Does have unexplained heart racing    Struggling to write  Can no longer do stairs.  Still having trouble writing checks   strength decreased   Speech is progressively slurred    Feels like memory is poor at times - trouble  "recalling names    Mild dyphagia    Workup  MRI brain showed decrease in [place] contour suggesting of MSA  PAGE scans showed normal uptake  EMG was normal 2019  CT C/AB/PEL was neg for malignancy    MELVIN 65 NL  TPO POS  Notes her son was diagnosed with PANDAS and "has funny eye movements" - poor fine motor skills as a kid    Does have REM sleep behavior issues for 3 months    PD Review of Symptoms:  Anosmia: None  Dysarthria/Hypophonia: None  Dysphagia/Sialorrhea: None  Hallucinations: None  Depression: None  Cognitive slowing: None  Urinary changes: None  Constipation: at times  Orthostasis: none  Falls: as above  Micrographia: none  Sleep issues:  -SUSAN: at times snores  -RBD: Talks and punches in her sleep  -Sleep Quality: good, wakes every 3 hours"    "PriorHPI: Nirali Kirkpatrick is a L HANDED 56 y.o. female with a medical issues significant for HTN, Anxiety, Vit B12 deficiency, who presents with ataxia for 1.5 years. She first noted 1.5 years ago her R foot was dragging and every 3-4 steps she would catch that toe. She noticed her R foot would catch on curbs. Going down steps is especially hard - she mis-steps often. She feels like she has progressively been imbalanced. Her first fall was a year ago. She has fallen more often since then. She falls twice a month now. Falls typically sideways. She holds onto her  for stability but does not use an assist device. Tends to have more ataxia after she's tired. She occasionally has a had tremor in her hands when starting IVs. She does at times feel clumsy with her hands. No spinning vertigo. No lightheadedness. No visual issues.    No double vision, eye drooping, head drop, dysphagia.  She was told her B12 was low normal Aug 2018, and has had 4 shots since    No dysarthria.    She saw a general neurologist  B6 - NL  B12 - 333    Brain MRI read as normal  Spine and Tspine 2018 - no central disc issues  Has not had an EMG    No fam hx ataxia, MS  Uncle has M. " Gravis  Mother and brother have had tremors      Review of Systems:   Review of Systems   Constitutional:  Negative for fever.   HENT:  Negative for congestion.    Eyes:  Negative for double vision.   Respiratory:  Negative for cough and shortness of breath.    Cardiovascular:  Negative for chest pain and leg swelling.   Gastrointestinal:  Negative for nausea.   Genitourinary:  Negative for dysuria.   Musculoskeletal:  Positive for falls.   Skin:  Negative for rash.   Neurological:  Positive for tremors. Negative for speech change and headaches.   Psychiatric/Behavioral:  Negative for depression.        Current Medications:  Outpatient Encounter Medications as of 9/22/2022   Medication Sig Dispense Refill    b complex vitamins tablet Take 1 tablet by mouth once daily.      clonazePAM (KLONOPIN) 0.5 MG tablet Take 1 tablet by mouth twice daily as needed for anxiety 30 tablet 0    cyanocobalamin 1,000 mcg/mL injection 1,000 mcg every 30 days.       desvenlafaxine succinate (PRISTIQ) 100 MG Tb24 100 mg every evening.       midodrine (PROAMATINE) 5 MG Tab Take 1 tablet (5 mg total) by mouth every 8 (eight) hours. 270 tablet 01    multivitamin capsule Take 1 capsule by mouth once daily.      tolterodine (DETROL LA) 4 MG 24 hr capsule Take 1 capsule (4 mg total) by mouth once daily. 30 capsule 8     No facility-administered encounter medications on file as of 9/22/2022.       Past Medical History:  HTN    Past Surgical History:  Gallbladder, gastric sleeve    Current Living Situation: home    Social:  Social History     Socioeconomic History    Marital status:    Tobacco Use    Smoking status: Never    Smokeless tobacco: Never   Substance and Sexual Activity    Alcohol use: Not Currently    Drug use: Never   Social History Narrative    ** Merged History Encounter **            Family History:  As above    PHYSICAL:  There were no vitals taken for this visit.    Physical Exam  Constitutional: Well-developed,  "well-nourished, appears stated age  Eyes: No scleral icterus  ENT: Moist oral mucosa  Cardiovascular: No lower extremity edema   Respiratory: No labored breathing   Skin: No rash   Hematologic: No bruising    Other: GI/ deferred   Mental status: Alert and oriented to person, place, time, and situation;   follows commands  Speech: mod dysarthric), no aphasia  Cranial nerves:            CN II: Pupils mid-position and equal, not tested light or accommodation  CN III, IV, VI: Extraocular movements full, no nystagmus visualized  CN V: Not tested   CN VII: Face strong and symmetric bilaterally   CN VIII: Hearing intact to voice and conversation   CN IX, X: Palate raises midline and symmetric   CN XI: Strong shoulder shrug B/L  CN XII: Tongue appears midline   Motor: Normal bulk by appearance, no drift   Sensory: Not tested    Gait: Not tested  Deep tendon reflexes: Not tested  Movement/Coordination                    No hypophonic speech.                     No facial masking.  Mild bradykinesia.  Mild L hand resting tremor  FNF R>L intention tremor  No stridor                  Bradykinesia  ? Finger taps Finger flicks PEDRO Heel taps   Left 0 0 0 0   Right 0 0 0 0       Tremor Exam   Arms extended Arms in wing position, fingers almost touching Re-emergent Arms extended wrists extended Intention Resting Kinetic   Left ? ? ?+ ? ?+ ? ?   Right ? ? ? ? ?+ ? ?   Head tremor: No-No Yes-Yes NE        "General Medical Examination:  General: Good hygiene, appropriate appearance.  HEENT: Normocephalic, atraumatic.   Neck: Supple.   Chest: Unlabored breathing.   CV: Symmetric pulses.   Ext: No clubbing, cyanosis, or edema.     Mental Status:  Mood/Affect: Appropriate/congruent.  Level of consciousness: Awake, alert.  Orientation: Oriented to person, place, time and situation.  Language: Mod Dysarthria    Cranial nerves:  I: Not tested  II: PERRL, VFF to counting  III, IV, VI: EOMI with conjugate gaze and no nystagmus on end gaze - " "no SWJs  V: Facial sensation intact and symmetric over the bilateral V1-V3  VII: Facial muscle activation intact and symmetric over the bilateral upper and lower face  VIII: Hearing intact in the b/l ears and symmetrical to finger rub  IX, X, XII: TUP midline - no atrophy or fasiculations  X: SCMs and shoulder shrug full strength b/l and symmetric  Trouble with PA PA TA TA  Trouble with GAs and Naomy      Motor:   Cannot squeeze and hold 's hands   When lifting her knees she cannot overcome her  pushing it down  No stridor    Hyperreflexic 3+ at pattelae    ? Finger taps Finger flicks PEDRO Heel taps   Left nl nl nl nl   Right nl nl Incoordinated nl   Neck tone: nl  ? Arm Leg   Left nl nl   Right nl nl         Coordination:   -Finger to nose: intention tremor mild bilat  Past-points 1 inch R hand >L hand  Disdiatochokinesias R hand moderate  Disdiatochokinesias L hand mild  -Heel to shin: mild issues R foot  R hand spiral +++  L hand spiral ++    No resting or postural tremor    Gait:  -Arises from chair without use of hands.  -Casual gait is: wide based, somewhat stiff and staggering, circumducts, waves moderately L to R when she walks - mod ataxic   -Stride length: nl  -Arm Swing: decreased R  -Turning: wide  -Tandem gait: cannot  No foot drop"      Laboratory Data:  Results for RADHA HAHN (MRN [id number]) as of 6/3/2019 09:52   Ref. Range 5/2/2019 11:23   Folate Latest Ref Range: 4.0 - 24.0 ng/mL 10.7   Vitamin B-12 Latest Ref Range: 180 - 914 ng/L 525   Sodium Latest Ref Range: 136 - 145 mmol/L 141   Potassium Latest Ref Range: 3.5 - 5.1 mmol/L 3.7   Chloride Latest Ref Range: 95 - 110 mmol/L 106   CO2 Latest Ref Range: 23 - 29 mmol/L 26   Anion Gap Latest Ref Range: 8 - 16 mmol/L 9   BUN, Bld Latest Ref Range: 6 - 20 mg/dL 22 (H)   Creatinine Latest Ref Range: 0.5 - 1.4 mg/dL 0.8   eGFR if non African American Latest Ref Range: >60 mL/min/1.73 m^2 >60.0   eGFR if  Latest Ref " Range: >60 mL/min/1.73 m^2 >60.0   Glucose Latest Ref Range: 70 - 110 mg/dL 92   Calcium Latest Ref Range: 8.7 - 10.5 mg/dL 9.8   Alkaline Phosphatase Latest Ref Range: 55 - 135 U/L 86   PROTEIN TOTAL Latest Ref Range: 6.0 - 8.4 g/dL 7.4   Albumin Latest Ref Range: 3.5 - 5.2 g/dL 4.2   BILIRUBIN TOTAL Latest Ref Range: 0.1 - 1.0 mg/dL 0.6   AST Latest Ref Range: 10 - 40 U/L 19   ALT Latest Ref Range: 10 - 44 U/L 16   Ammonia Latest Ref Range: 10 - 50 umol/L 26   Arsenic Latest Ref Range: 0 - 12 ng/mL <1   Thiamine Latest Ref Range: 38 - 122 ug/L 63   Vitamin E Latest Ref Range: 500 - 1800 ug/dL 1302   Glutamic Acid Decarb Ab Latest Ref Range: <=0.02 nmol/L 0.00   TSH Latest Ref Range: 0.400 - 4.000 uIU/mL 0.649   PTH Latest Ref Range: 9.0 - 77.0 pg/mL 103.0 (H)   Race Unknown Test Not Performed   AChR Binding Ab, Serum Latest Ref Range: <=0.02 nmol/L 0.00   AChR Ganglionic Neuronal Ab Latest Ref Range: <=0.02 nmol/L 0.00   CRMP-5 IgG Latest Ref Range: <1:240 titer Negative   Neuronal (V-G) K+ Channel Ab, Serum Latest Ref Range: <=0.02 nmol/L 0.00   NMO Interpretive Comments Unknown SEE BELOW   N-Type Calcium Channel Ab Latest Ref Range: <=0.03 nmol/L 0.00   P/Q Type Calcium Channel Ab Latest Ref Range: <=0.02 nmol/L 0.00   PAVAL AGNA-1, Serum Latest Ref Range: <1:240 titer Negative   PAVAL TOBY-1, Serum Latest Ref Range: <1:240 titer Negative   PAVAL TOBY-2, Serum Latest Ref Range: <1:240 titer Negative   PAVAL TOBY-3, Serum Latest Ref Range: <1:240 titer Negative   PAVAL reflex test added Unknown None.   PAVAL,  Amphiphysin Ab, Serum Latest Ref Range: <1:240 titer Negative   PAVAL, PCA-1, Serum Latest Ref Range: <1:240 titer Negative   PAVAL, PCA-2, Serum Latest Ref Range: <1:240 titer Negative   PAVAL, PCA-Tr, Serum Latest Ref Range: <1:240 titer Negative   STRIATED MUSCLE AB Latest Ref Range: <1:120 titer Negative   Lead Latest Ref Range: 0.0 - 4.9 mcg/dL <1.0   Cadmium Latest Ref Range: 0.0 - 4.9 ng/mL 0.3    City Unknown Test Not Performed   County Unknown Test Not Performed   Guardian First Name Unknown Test Not Performed   Guardian Last Name Unknown Test Not Performed   Home Phone Unknown Test Not Performed   [place] Latest Ref Range: 0 - 9 ng/mL 2   State Unknown Test Not Performed   Street Address Unknown Test Not Performed   Venous/Capillary Unknown Test Not Performed   Zip Unknown Test Not Performed      Results for RADHA HAHN (MRN [id number]) as of 8/23/2019 10:10   Ref. Range 6/3/2019 10:27   Sed Rate Latest Ref Range: 0 - 36 mm/Hr 13   CRP Latest Ref Range: 0.0 - 8.2 mg/L 0.5   CPK Latest Ref Range: 20 - 180 U/L 55   CERULOPLASMIN Latest Ref Range: 15.0 - 45.0 mg/dL 33.0   Acetylchol Modul Ab Latest Units: % 0   AChR Binding Ab, Serum Latest Ref Range: <=0.02 nmol/L 0.00   MG Interpretive Comments Unknown SEE BELOW   MuSK Antibody Test Latest Ref Range: 0.00 - 0.02 nmol/L 0.00   STRIATED MUSCLE AB Latest Ref Range: <1:120 titer Negative   Aldolase Latest Ref Range: 1.2 - 7.6 U/L 3.5       Imaging:  Brain MRI w/o cerebellar degeneration or lesion - however poor quality MRI  Repeat brain MRI  FINDINGS:  MRI of the brain demonstrates no infarction, mass, hemorrhage, extra-axial collection, or other acute finding.  A skull base lesions are seen.  Following contrast administration, there is no abnormal enhancing lesion identified.  A small incidental developmental venous anomaly is seen in the right medial posterior thalamus.  Cspine w/o without central impingement    EMG  Summary   Nerve conduction study of bilateral lower extremities revealed normal antidromic sensory peak latencies, action potentials, and conduction velocities.   Motor peak latencies, amplitudes, and conduction velocities were normal. F-waves were normal.   Concentric needle examination of selected muscles in bilateral lower extremities revealed no evidence of acute or chronic denervation.   Impression   This is a normal study.      Assessment//Plan:   Problem List Items Addressed This Visit          Neuro    Ataxia    Current Assessment & Plan     Moderate progressive ataxia, worsening.  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  Dysautonomia which also fits.    Suggested continue exercising for balance  Suggetsed start hobbies for manual dexterity    No benefit from Ldopa            Cardiac/Vascular    Orthostasis    Current Assessment & Plan     Some dizziness on standing  Suggested try midodrine 7.5mgAM/5mg PM            Ondina Polo MD, MS  [facility]  Department of Neurology  Movement Disorders

## 2022-09-22 NOTE — ASSESSMENT & PLAN NOTE
Moderate progressive ataxia, worsening.  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  Dysautonomia which also fits.    Suggested continue exercising for balance  Suggetsed start hobbies for manual dexterity    No benefit from Ldopa

## 2022-10-17 ENCOUNTER — PATIENT MESSAGE (OUTPATIENT)
Dept: NEUROLOGY | Facility: CLINIC | Age: 57
End: 2022-10-17
Payer: COMMERCIAL

## 2022-10-18 ENCOUNTER — PATIENT MESSAGE (OUTPATIENT)
Dept: UROLOGY | Facility: CLINIC | Age: 57
End: 2022-10-18
Payer: COMMERCIAL

## 2022-11-09 ENCOUNTER — OFFICE VISIT (OUTPATIENT)
Dept: UROLOGY | Facility: CLINIC | Age: 57
End: 2022-11-09
Payer: COMMERCIAL

## 2022-11-09 VITALS
DIASTOLIC BLOOD PRESSURE: 93 MMHG | HEART RATE: 81 BPM | WEIGHT: 202.81 LBS | BODY MASS INDEX: 32.59 KG/M2 | HEIGHT: 66 IN | SYSTOLIC BLOOD PRESSURE: 137 MMHG

## 2022-11-09 DIAGNOSIS — N39.46 MIXED INCONTINENCE: ICD-10-CM

## 2022-11-09 DIAGNOSIS — N30.90 BLADDER INFECTION: ICD-10-CM

## 2022-11-09 DIAGNOSIS — R39.15 URINARY URGENCY: Primary | ICD-10-CM

## 2022-11-09 DIAGNOSIS — N31.9 NEUROGENIC BLADDER: ICD-10-CM

## 2022-11-09 DIAGNOSIS — N39.41 URGE INCONTINENCE: ICD-10-CM

## 2022-11-09 PROCEDURE — 3080F PR MOST RECENT DIASTOLIC BLOOD PRESSURE >= 90 MM HG: ICD-10-PCS | Mod: CPTII,S$GLB,, | Performed by: UROLOGY

## 2022-11-09 PROCEDURE — 87088 URINE BACTERIA CULTURE: CPT | Performed by: UROLOGY

## 2022-11-09 PROCEDURE — 3008F PR BODY MASS INDEX (BMI) DOCUMENTED: ICD-10-PCS | Mod: CPTII,S$GLB,, | Performed by: UROLOGY

## 2022-11-09 PROCEDURE — 99999 PR PBB SHADOW E&M-EST. PATIENT-LVL III: CPT | Mod: PBBFAC,,, | Performed by: UROLOGY

## 2022-11-09 PROCEDURE — 3075F PR MOST RECENT SYSTOLIC BLOOD PRESS GE 130-139MM HG: ICD-10-PCS | Mod: CPTII,S$GLB,, | Performed by: UROLOGY

## 2022-11-09 PROCEDURE — 1159F PR MEDICATION LIST DOCUMENTED IN MEDICAL RECORD: ICD-10-PCS | Mod: CPTII,S$GLB,, | Performed by: UROLOGY

## 2022-11-09 PROCEDURE — 87186 SC STD MICRODIL/AGAR DIL: CPT | Performed by: UROLOGY

## 2022-11-09 PROCEDURE — 87086 URINE CULTURE/COLONY COUNT: CPT | Performed by: UROLOGY

## 2022-11-09 PROCEDURE — 99215 OFFICE O/P EST HI 40 MIN: CPT | Mod: S$GLB,,, | Performed by: UROLOGY

## 2022-11-09 PROCEDURE — 1159F MED LIST DOCD IN RCRD: CPT | Mod: CPTII,S$GLB,, | Performed by: UROLOGY

## 2022-11-09 PROCEDURE — 99999 PR PBB SHADOW E&M-EST. PATIENT-LVL III: ICD-10-PCS | Mod: PBBFAC,,, | Performed by: UROLOGY

## 2022-11-09 PROCEDURE — 3008F BODY MASS INDEX DOCD: CPT | Mod: CPTII,S$GLB,, | Performed by: UROLOGY

## 2022-11-09 PROCEDURE — 3080F DIAST BP >= 90 MM HG: CPT | Mod: CPTII,S$GLB,, | Performed by: UROLOGY

## 2022-11-09 PROCEDURE — 81002 URINALYSIS NONAUTO W/O SCOPE: CPT | Mod: S$GLB,,, | Performed by: UROLOGY

## 2022-11-09 PROCEDURE — 3075F SYST BP GE 130 - 139MM HG: CPT | Mod: CPTII,S$GLB,, | Performed by: UROLOGY

## 2022-11-09 PROCEDURE — 99215 PR OFFICE/OUTPT VISIT, EST, LEVL V, 40-54 MIN: ICD-10-PCS | Mod: S$GLB,,, | Performed by: UROLOGY

## 2022-11-09 PROCEDURE — 87077 CULTURE AEROBIC IDENTIFY: CPT | Performed by: UROLOGY

## 2022-11-09 PROCEDURE — 81002 PR URINALYSIS NONAUTO W/O SCOPE: ICD-10-PCS | Mod: S$GLB,,, | Performed by: UROLOGY

## 2022-11-09 RX ORDER — SULFAMETHOXAZOLE AND TRIMETHOPRIM 800; 160 MG/1; MG/1
1 TABLET ORAL 2 TIMES DAILY
Qty: 14 TABLET | Refills: 0 | Status: SHIPPED | OUTPATIENT
Start: 2022-11-09 | End: 2022-11-16

## 2022-11-09 RX ORDER — MIRABEGRON 50 MG/1
1 TABLET, FILM COATED, EXTENDED RELEASE ORAL DAILY
Qty: 30 TABLET | Refills: 11 | Status: SHIPPED | OUTPATIENT
Start: 2022-11-09 | End: 2023-05-02

## 2022-11-09 NOTE — PROGRESS NOTES
CHIEF COMPLAINT:    Mrs. Kirkpatrick is a 57 y.o. female presenting for a follow up appointment, cannot tolerate Detrol LA  PRESENTING ILLNESS:    Nirali Kirkpatrick is a 57 y.o. female who is presents with a history of multi systems atrophy with urgency, frequency and urge incontinence.  She states that the Detrol LA causes her to have dry mouth and constipation.  It is somewhat effective though she has issues when her bladder is full (first am void) she has particular challenges early in the morning stating at 4 am she has to urinate every 1 1/2 hours.  She would normally start her day at 8 or 9 am.  She generally retires to bed around 8 or 9 pm.  She is accompanied by her  today    REVIEW OF SYSTEMS:    Review of Systems   Constitutional: Negative.    HENT: Negative.     Eyes: Negative.    Respiratory: Negative.     Cardiovascular: Negative.    Gastrointestinal: Negative.    Genitourinary:  Positive for frequency and urgency.   Musculoskeletal: Negative.    Skin: Negative.    Neurological:  Positive for weakness.        Multi system atrophy   Endo/Heme/Allergies: Negative.    Psychiatric/Behavioral: Negative.       PATIENT HISTORY:    Past Medical History:   Diagnosis Date    Depression     Hypertension        Past Surgical History:   Procedure Laterality Date    CHOLECYSTECTOMY      CYSTOSCOPY N/A 6/15/2021    Procedure: CYSTOSCOPY;  Surgeon: Susan Mcgee MD;  Location: [address];  Service: Urology;  Laterality: N/A;    CYSTOSCOPY N/A 7/13/2021    Procedure: CYSTOSCOPY;  Surgeon: Susan Mcgee MD;  Location: [address];  Service: Urology;  Laterality: N/A;    FLUOROSCOPIC URODYNAMIC STUDY N/A 6/15/2021    Procedure: URODYNAMIC STUDY, FLUOROSCOPIC;  Surgeon: Susan Mcgee MD;  Location: [facility] OR [address] [place] [address];  Service: Urology;  Laterality: N/A;  90 MINUTES     Gastric sleeve  2013    INSERTION OF MIDURETHRAL SLING N/A 7/13/2021    Procedure: SLING, MIDURETHRAL;  Surgeon: Susan Mcgee MD;   Location: [facility] OR [address] [place] [address];  Service: Urology;  Laterality: N/A;  1 HOUR     tubligation  2004       Family History   Problem Relation Age of Onset    Stroke Mother     Hypertension Mother     Heart disease Mother     Heart disease Father     Lung disease Father     Hyperlipidemia Father     Hypertension Father        Social History     Socioeconomic History    Marital status:    Tobacco Use    Smoking status: Never    Smokeless tobacco: Never   Substance and Sexual Activity    Alcohol use: Not Currently    Drug use: Never   Social History Narrative    ** Merged History Encounter **            Allergies:  Patient has no known allergies.    Medications:  Outpatient Encounter Medications as of 11/9/2022   Medication Sig Dispense Refill    b complex vitamins tablet Take 1 tablet by mouth once daily.      clonazePAM (KLONOPIN) 0.5 MG tablet Take 1 tablet by mouth twice daily as needed for anxiety 30 tablet 0    cyanocobalamin 1,000 mcg/mL injection 1,000 mcg every 30 days.       desvenlafaxine succinate (PRISTIQ) 100 MG Tb24 100 mg every evening.       multivitamin capsule Take 1 capsule by mouth once daily.      tolterodine (DETROL LA) 4 MG 24 hr capsule Take 1 capsule (4 mg total) by mouth once daily. 30 capsule 8    midodrine (PROAMATINE) 5 MG Tab Take 1 tablet (5 mg total) by mouth every 8 (eight) hours. 270 tablet 01     No facility-administered encounter medications on file as of 11/9/2022.         PHYSICAL EXAMINATION:    The patient generally appears in good health, is appropriately interactive, and is in no apparent distress.    Skin: No lesions.    Mental: Cooperative with normal affect.    Neuro: Grossly intact.    HEENT: Normal. No evidence of lymphadenopathy.    Chest:  normal inspiratory effort.    Abdomen: Soft, non-tender. No masses or organomegaly. Bladder is not palpable. No evidence of flank discomfort. No evidence of inguinal hernia.    Extremities: No clubbing, cyanosis, or edema    Normal  external female genitalia  Urethral meatus is normal  Urethra and bladder are nontender to bimanual exam  Well supported anteriorly and posteriorly   Uterus and cervix are normal  No adnexal masses  Random bladder volume was 100 ml    LABS:    Lab Results   Component Value Date    BUN 20 07/14/2021    CREATININE 0.7 07/14/2021       UA 1.025, pH 5, nitrite +, tr protein, otherwise, negative.     IMPRESSION:    Bladder infection  Urinary urgency, urge incontinence  Neurogenic bladder (secondary to MSA)    PLAN:    1. Myrbetriq sent to [facility] to see if they can get a formulary exemption for intolerance of antimuscarinics.  It looks like beta 3 agonists are not on the formulary, will see what can be done.  2.  Discontinue the detrol LA since she cannot tolerate it  3.  The catheterized specimen was sent for culture  4.  Bactrim DS sent to her local pharmacy.     I spent 40 minutes with the patient of which more than half was spent in direct consultation with the patient in regards to our treatment and plan.

## 2022-11-12 LAB — BACTERIA UR CULT: ABNORMAL

## 2022-12-05 ENCOUNTER — PATIENT MESSAGE (OUTPATIENT)
Dept: UROLOGY | Facility: CLINIC | Age: 57
End: 2022-12-05
Payer: COMMERCIAL

## 2022-12-05 ENCOUNTER — DOCUMENTATION ONLY (OUTPATIENT)
Dept: REHABILITATION | Facility: HOSPITAL | Age: 57
End: 2022-12-05
Payer: COMMERCIAL

## 2022-12-05 DIAGNOSIS — R47.1 DYSARTHRIA: Primary | ICD-10-CM

## 2022-12-05 DIAGNOSIS — R13.10 DYSPHAGIA, UNSPECIFIED TYPE: ICD-10-CM

## 2022-12-05 NOTE — PROGRESS NOTES
Outpatient Therapy Discharge Summary   Discharge Date: 12/5/2022   Name: Nirali Kirkpatrick  Clinic Number: [id number]  Therapy Diagnosis:   Encounter Diagnoses   Name Primary?    Dysarthria Yes    Dysphagia, unspecified type      Physician: No ref. provider found   Physician: Ondina Polo MD  Physician Orders: Ambulatory Referral to Speech Therapy   Medical Diagnosis: Multiple System Atrophy     Date of Last visit: 3/30/2022  Total Visits Received: 2    Assessment    Assessment of Current Status: Patient was seen for evaluation and a follow up Fiberoptic Endoscopic Evaluation of the Swallow (FEES) was conducted. It was determined that skilled speech therapy intervention services are not warranted at this time as this dysphagia is likely secondary to degenerative disease where there is not evidence of dysphagia treatments. This dysphagia will likely worsen over time secondary to the degenerative nature of the disease. (HIRAL Freedman, ANNALISA Rodriguez, VERNON Oliveira, ANNALISA Gilbert., HIRAL Whittaker, KATHY Manriquez., Andrew, P. A., PAZ Ceron., RUDDY Barajas., SUDHAKAR Reynolds., SHAHIDA Chow., HIRAL Mobley., JOSE ANTONIO Hobson., PAZ Campos., Kaden, KATHY., Hill, K. P., GUICHO Rosenthal., KEV Esposito., SUDHAKAR Pradhan., RUDDY Ordonez.,  South, ROSA MARIA. (2021). Dysphagia in multiple system atrophy consensus statement on diagnosis, prognosis and treatment. Parkinsonism & related disorders, 86, 124-132. [email or web address])     Short Term Goals (4 weeks):   1. Patient will utilize clear speech strategies independently. -not met  2. Patient will complete Fiberoptic Endoscopic Evaluation of the Swallow (FEES). -Goal Met     Long Term Goals (5 weeks):   1.  She  will maintain adequate hydration/nutrition with optimum safety and efficiency of swallowing function on PO intake without overt signs and symptoms of aspiration for IDDSI 0/7 diet level. -not met  2. She  will develop functional and intelligible speech and utilize  compensatory strategies through the use of adequate labial and lingual function, increased articulatory precision and speech prosody. -Not met    Discharge reason: Other:  skilled speech therapy intervention services are not warranted at this time as this dysphagia is likely secondary to degenerative disease where there is not evidence of dysphagia treatments. This dysphagia will likely worsen over time secondary to the degenerative nature of the disease.    Plan   This patient is discharged from Speech Therapy    Chiquis Carlos CCC-SLP   12/5/2022

## 2022-12-21 ENCOUNTER — PATIENT MESSAGE (OUTPATIENT)
Dept: NEUROLOGY | Facility: CLINIC | Age: 57
End: 2022-12-21
Payer: COMMERCIAL

## 2023-01-13 ENCOUNTER — OFFICE VISIT (OUTPATIENT)
Dept: NEUROLOGY | Facility: CLINIC | Age: 58
End: 2023-01-13
Payer: COMMERCIAL

## 2023-01-13 ENCOUNTER — PATIENT MESSAGE (OUTPATIENT)
Dept: NEUROLOGY | Facility: CLINIC | Age: 58
End: 2023-01-13

## 2023-01-13 VITALS
WEIGHT: 200 LBS | DIASTOLIC BLOOD PRESSURE: 100 MMHG | HEIGHT: 66 IN | HEART RATE: 79 BPM | BODY MASS INDEX: 32.14 KG/M2 | SYSTOLIC BLOOD PRESSURE: 144 MMHG

## 2023-01-13 DIAGNOSIS — I95.1 ORTHOSTASIS: ICD-10-CM

## 2023-01-13 DIAGNOSIS — G90.3 MULTIPLE SYSTEM ATROPHY: Primary | ICD-10-CM

## 2023-01-13 DIAGNOSIS — G23.8 MULTIPLE SYSTEM ATROPHY: Primary | ICD-10-CM

## 2023-01-13 PROCEDURE — 3077F SYST BP >= 140 MM HG: CPT | Mod: CPTII,S$GLB,, | Performed by: PSYCHIATRY & NEUROLOGY

## 2023-01-13 PROCEDURE — 3080F DIAST BP >= 90 MM HG: CPT | Mod: CPTII,S$GLB,, | Performed by: PSYCHIATRY & NEUROLOGY

## 2023-01-13 PROCEDURE — 99999 PR PBB SHADOW E&M-EST. PATIENT-LVL III: CPT | Mod: PBBFAC,,, | Performed by: PSYCHIATRY & NEUROLOGY

## 2023-01-13 PROCEDURE — 1159F PR MEDICATION LIST DOCUMENTED IN MEDICAL RECORD: ICD-10-PCS | Mod: CPTII,S$GLB,, | Performed by: PSYCHIATRY & NEUROLOGY

## 2023-01-13 PROCEDURE — 99999 PR PBB SHADOW E&M-EST. PATIENT-LVL III: ICD-10-PCS | Mod: PBBFAC,,, | Performed by: PSYCHIATRY & NEUROLOGY

## 2023-01-13 PROCEDURE — 3077F PR MOST RECENT SYSTOLIC BLOOD PRESSURE >= 140 MM HG: ICD-10-PCS | Mod: CPTII,S$GLB,, | Performed by: PSYCHIATRY & NEUROLOGY

## 2023-01-13 PROCEDURE — 3080F PR MOST RECENT DIASTOLIC BLOOD PRESSURE >= 90 MM HG: ICD-10-PCS | Mod: CPTII,S$GLB,, | Performed by: PSYCHIATRY & NEUROLOGY

## 2023-01-13 PROCEDURE — 99215 PR OFFICE/OUTPT VISIT, EST, LEVL V, 40-54 MIN: ICD-10-PCS | Mod: S$GLB,,, | Performed by: PSYCHIATRY & NEUROLOGY

## 2023-01-13 PROCEDURE — 3008F BODY MASS INDEX DOCD: CPT | Mod: CPTII,S$GLB,, | Performed by: PSYCHIATRY & NEUROLOGY

## 2023-01-13 PROCEDURE — 3008F PR BODY MASS INDEX (BMI) DOCUMENTED: ICD-10-PCS | Mod: CPTII,S$GLB,, | Performed by: PSYCHIATRY & NEUROLOGY

## 2023-01-13 PROCEDURE — 1159F MED LIST DOCD IN RCRD: CPT | Mod: CPTII,S$GLB,, | Performed by: PSYCHIATRY & NEUROLOGY

## 2023-01-13 PROCEDURE — 99215 OFFICE O/P EST HI 40 MIN: CPT | Mod: S$GLB,,, | Performed by: PSYCHIATRY & NEUROLOGY

## 2023-01-13 RX ORDER — MIDODRINE HYDROCHLORIDE 5 MG/1
5 TABLET ORAL
Qty: 270 TABLET | Refills: 1 | Status: SHIPPED | OUTPATIENT
Start: 2023-01-13 | End: 2023-02-12

## 2023-01-13 RX ORDER — CARBIDOPA AND LEVODOPA 25; 100 MG/1; MG/1
1 TABLET ORAL 3 TIMES DAILY
Qty: 90 TABLET | Refills: 11 | Status: SHIPPED | OUTPATIENT
Start: 2023-01-13 | End: 2023-05-02

## 2023-01-13 RX ORDER — PAROXETINE HYDROCHLORIDE 20 MG/1
20 TABLET, FILM COATED ORAL EVERY MORNING
COMMUNITY
End: 2023-04-21

## 2023-01-13 NOTE — ASSESSMENT & PLAN NOTE
Some dizziness on standing continues  Suggested try midodrine 5mg TID  BPS not to screed 160 systolic sustained

## 2023-01-13 NOTE — ASSESSMENT & PLAN NOTE
Moderate progressive ataxia, worsening.  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  Dysautonomia which also fits.    Suggested continue exercising for balance -PT  Suggetsed start hobbies for manual dexterity    As her gait issues now include trouble initiating feet, suggested low dose LDOPA  carbidopa/levodopa 25/100mg 1/2 tab PO TID     Suggested talk to SW re: long term care planning and education of her daugthers to decrease their demands on her

## 2023-01-13 NOTE — PROGRESS NOTES
"  MOVEMENT DISORDERS [facility] Followup  PCP/Referring Provider: No referring provider defined for this encounter.  Date of Service: 1/13/2023    Chief Complaint: Ataxia    Interval Hx    Since last visit,   Declined significantly in ADLs  Feels like her daughters have a poor understanding of her disabilities  Still Dizzy on standing  Stopped carbidopa/levodopa  Dizzy on standing still but not as prominent  Taking midodrine BID 5mg   Still dizzy on standing  Starting to fall more sometimes due to dizziness  Still Stopped working due to falls  Walker inhome can walk 3-4 feet- and motorized scooter for distance    Dyarthria worse   Falls weekly at least  Doing PT self guided    No stridor  No change to sweating  Does have REM sleep behavior    Struggling to write  Can no longer do stairs.  Still having trouble writing checks   strength decreased   Speech is progressively slurred    Feels like memory is poor at times - trouble recalling names  Mild dyphagia    Workup  MRI brain showed decrease in [place] contour suggesting of MSA  PAGE scans showed normal uptake  EMG was normal 2019  CT C/AB/PEL was neg for malignancy    MELVIN 65 NL  TPO POS  Notes her son was diagnosed with PANDAS and "has funny eye movements" - poor fine motor skills as a kid    Does have REM sleep behavior issues for 3 months    PD Review of Symptoms:  Anosmia: None  Dysarthria/Hypophonia: None  Dysphagia/Sialorrhea: None  Hallucinations: None  Depression: None  Cognitive slowing: None  Urinary changes: None  Constipation: at times  Orthostasis: none  Falls: as above  Micrographia: none  Sleep issues:  -SUSAN: at times snores  -RBD: Talks and punches in her sleep  -Sleep Quality: good, wakes every 3 hours"    "PriorHPI: Nirali Kirkpatrick is a L HANDED 57 y.o. female with a medical issues significant for HTN, Anxiety, Vit B12 deficiency, who presents with ataxia for 1.5 years. She first noted 1.5 years ago her R foot was dragging and every 3-4 steps she " would catch that toe. She noticed her R foot would catch on curbs. Going down steps is especially hard - she mis-steps often. She feels like she has progressively been imbalanced. Her first fall was a year ago. She has fallen more often since then. She falls twice a month now. Falls typically sideways. She holds onto her  for stability but does not use an assist device. Tends to have more ataxia after she's tired. She occasionally has a had tremor in her hands when starting IVs. She does at times feel clumsy with her hands. No spinning vertigo. No lightheadedness. No visual issues.    No double vision, eye drooping, head drop, dysphagia.  She was told her B12 was low normal Aug 2018, and has had 4 shots since    No dysarthria.    She saw a general neurologist  B6 - NL  B12 - 333    Brain MRI read as normal  Spine and Tspine 2018 - no central disc issues  Has not had an EMG    No fam hx ataxia, MS  Uncle has M. Gravis  Mother and brother have had tremors      Review of Systems:   Review of Systems   Constitutional:  Negative for fever.   HENT:  Negative for congestion.    Eyes:  Negative for double vision.   Respiratory:  Negative for cough and shortness of breath.    Cardiovascular:  Negative for chest pain and leg swelling.   Gastrointestinal:  Negative for nausea.   Genitourinary:  Negative for dysuria.   Musculoskeletal:  Positive for falls.   Skin:  Negative for rash.   Neurological:  Positive for tremors. Negative for speech change and headaches.   Psychiatric/Behavioral:  Negative for depression.        Current Medications:  Outpatient Encounter Medications as of 1/13/2023   Medication Sig Dispense Refill    b complex vitamins tablet Take 1 tablet by mouth once daily.      clonazePAM (KLONOPIN) 0.5 MG tablet Take 1 tablet by mouth twice daily as needed for anxiety 30 tablet 0    cyanocobalamin 1,000 mcg/mL injection 1,000 mcg every 30 days.       desvenlafaxine succinate (PRISTIQ) 100 MG Tb24 100 mg  "every evening.       mirabegron (MYRBETRIQ) 50 mg Tb24 Take 1 tablet (50 mg total) by mouth once daily. 30 tablet 11    paroxetine (PAXIL) 20 MG tablet Take 20 mg by mouth every morning.      carbidopa-levodopa  mg (SINEMET)  mg per tablet Take 1 tablet by mouth 3 (three) times daily. 90 tablet 11    midodrine (PROAMATINE) 5 MG Tab Take 1 tablet (5 mg total) by mouth 3 (three) times daily with meals. 270 tablet 01    multivitamin capsule Take 1 capsule by mouth once daily.      tolterodine (DETROL LA) 4 MG 24 hr capsule Take 1 capsule (4 mg total) by mouth once daily. 30 capsule 8    [DISCONTINUED] midodrine (PROAMATINE) 5 MG Tab Take 1 tablet (5 mg total) by mouth every 8 (eight) hours. 270 tablet 01     No facility-administered encounter medications on file as of 1/13/2023.       Past Medical History:  HTN    Past Surgical History:  Gallbladder, gastric sleeve    Current Living Situation: home    Social:  Social History     Socioeconomic History    Marital status:    Tobacco Use    Smoking status: Never    Smokeless tobacco: Never   Substance and Sexual Activity    Alcohol use: Not Currently    Drug use: Never   Social History Narrative    ** Merged History Encounter **            Family History:  As above    PHYSICAL:  BP (!) 144/100 (BP Location: Left arm, Patient Position: Sitting, BP Method: Medium (Automatic))   Pulse 79   Ht 5' 6" (1.676 m)   Wt 90.7 kg (200 lb) Comment: reported  BMI 32.28 kg/m²     Physical Exam  Constitutional: Well-developed, well-nourished, appears stated age  Eyes: No scleral icterus  ENT: Moist oral mucosa  Cardiovascular: No lower extremity edema   Respiratory: No labored breathing   Skin: No rash   Hematologic: No bruising    Other: GI/ deferred   Mental status: Alert and oriented to person, place, time, and situation;   follows commands  Speech: mod dysarthric), no aphasia  Cranial nerves:            CN II: Pupils mid-position and equal, not tested light or " "accommodation  CN III, IV, VI: Extraocular movements full, no nystagmus visualized  CN V: Not tested   CN VII: Face strong and symmetric bilaterally   CN VIII: Hearing intact to voice and conversation   CN IX, X: Palate raises midline and symmetric   CN XI: Strong shoulder shrug B/L  CN XII: Tongue appears midline   Motor: Normal bulk by appearance, no drift   Sensory: Not tested    Gait: Not tested  Deep tendon reflexes: Not tested  Movement/Coordination                    No hypophonic speech.                     No facial masking.  Mild bradykinesia.  Mild L hand resting tremor  FNF R>L intention tremor  No stridor                  Bradykinesia  ? Finger taps Finger flicks PEDRO Heel taps   Left 0 0 0 0   Right 0 0 0 0       Tremor Exam   Arms extended Arms in wing position, fingers almost touching Re-emergent Arms extended wrists extended Intention Resting Kinetic   Left ? ? ?+ ? ?+ ? ?   Right ? ? ? ? ?+ ? ?   Head tremor: No-No Yes-Yes NE        "General Medical Examination:  General: Good hygiene, appropriate appearance.  HEENT: Normocephalic, atraumatic.   Neck: Supple.   Chest: Unlabored breathing.   CV: Symmetric pulses.   Ext: No clubbing, cyanosis, or edema.     Mental Status:  Mood/Affect: Appropriate/congruent.  Level of consciousness: Awake, alert.  Orientation: Oriented to person, place, time and situation.  Language: Mod Dysarthria    Cranial nerves:  I: Not tested  II: PERRL, VFF to counting  III, IV, VI: EOMI with conjugate gaze and no nystagmus on end gaze - no SWJs  V: Facial sensation intact and symmetric over the bilateral V1-V3  VII: Facial muscle activation intact and symmetric over the bilateral upper and lower face  VIII: Hearing intact in the b/l ears and symmetrical to finger rub  IX, X, XII: TUP midline - no atrophy or fasiculations  X: SCMs and shoulder shrug full strength b/l and symmetric  Trouble with PA PA TA TA  Trouble with GAs and Naomy      Motor:   Cannot squeeze and hold " "'s hands   When lifting her knees she cannot overcome her  pushing it down  No stridor    Hyperreflexic 3+ at pattelae    ? Finger taps Finger flicks PEDRO Heel taps   Left nl nl nl nl   Right nl nl Incoordinated nl   Neck tone: nl  ? Arm Leg   Left nl nl   Right nl nl         Coordination:   -Finger to nose: intention tremor mild bilat  Past-points 1 inch R hand >L hand  Disdiatochokinesias R hand moderate  Disdiatochokinesias L hand mild  -Heel to shin: mild issues R foot  R hand spiral +++  L hand spiral ++    No resting or postural tremor    Gait:  -Arises from chair without use of hands.  -Casual gait is: wide based, somewhat stiff and staggering, circumducts, waves moderately L to R when she walks - mod ataxic   -Stride length: nl  -Arm Swing: decreased R  -Turning: wide  -Tandem gait: cannot  No foot drop"      Laboratory Data:  Results for RADHA HAHN (MRN [id number]) as of 6/3/2019 09:52   Ref. Range 5/2/2019 11:23   Folate Latest Ref Range: 4.0 - 24.0 ng/mL 10.7   Vitamin B-12 Latest Ref Range: 180 - 914 ng/L 525   Sodium Latest Ref Range: 136 - 145 mmol/L 141   Potassium Latest Ref Range: 3.5 - 5.1 mmol/L 3.7   Chloride Latest Ref Range: 95 - 110 mmol/L 106   CO2 Latest Ref Range: 23 - 29 mmol/L 26   Anion Gap Latest Ref Range: 8 - 16 mmol/L 9   BUN, Bld Latest Ref Range: 6 - 20 mg/dL 22 (H)   Creatinine Latest Ref Range: 0.5 - 1.4 mg/dL 0.8   eGFR if non African American Latest Ref Range: >60 mL/min/1.73 m^2 >60.0   eGFR if African American Latest Ref Range: >60 mL/min/1.73 m^2 >60.0   Glucose Latest Ref Range: 70 - 110 mg/dL 92   Calcium Latest Ref Range: 8.7 - 10.5 mg/dL 9.8   Alkaline Phosphatase Latest Ref Range: 55 - 135 U/L 86   PROTEIN TOTAL Latest Ref Range: 6.0 - 8.4 g/dL 7.4   Albumin Latest Ref Range: 3.5 - 5.2 g/dL 4.2   BILIRUBIN TOTAL Latest Ref Range: 0.1 - 1.0 mg/dL 0.6   AST Latest Ref Range: 10 - 40 U/L 19   ALT Latest Ref Range: 10 - 44 U/L 16   Ammonia Latest Ref " Range: 10 - 50 umol/L 26   Arsenic Latest Ref Range: 0 - 12 ng/mL <1   Thiamine Latest Ref Range: 38 - 122 ug/L 63   Vitamin E Latest Ref Range: 500 - 1800 ug/dL 1302   Glutamic Acid Decarb Ab Latest Ref Range: <=0.02 nmol/L 0.00   TSH Latest Ref Range: 0.400 - 4.000 uIU/mL 0.649   PTH Latest Ref Range: 9.0 - 77.0 pg/mL 103.0 (H)   Race Unknown Test Not Performed   AChR Binding Ab, Serum Latest Ref Range: <=0.02 nmol/L 0.00   AChR Ganglionic Neuronal Ab Latest Ref Range: <=0.02 nmol/L 0.00   CRMP-5 IgG Latest Ref Range: <1:240 titer Negative   Neuronal (V-G) K+ Channel Ab, Serum Latest Ref Range: <=0.02 nmol/L 0.00   NMO Interpretive Comments Unknown SEE BELOW   N-Type Calcium Channel Ab Latest Ref Range: <=0.03 nmol/L 0.00   P/Q Type Calcium Channel Ab Latest Ref Range: <=0.02 nmol/L 0.00   PAVAL AGNA-1, Serum Latest Ref Range: <1:240 titer Negative   PAVAL TOBY-1, Serum Latest Ref Range: <1:240 titer Negative   PAVAL TOBY-2, Serum Latest Ref Range: <1:240 titer Negative   PAVAL TOBY-3, Serum Latest Ref Range: <1:240 titer Negative   PAVAL reflex test added Unknown None.   PAVAL,  Amphiphysin Ab, Serum Latest Ref Range: <1:240 titer Negative   PAVAL, PCA-1, Serum Latest Ref Range: <1:240 titer Negative   PAVAL, PCA-2, Serum Latest Ref Range: <1:240 titer Negative   PAVAL, PCA-Tr, Serum Latest Ref Range: <1:240 titer Negative   STRIATED MUSCLE AB Latest Ref Range: <1:120 titer Negative   Lead Latest Ref Range: 0.0 - 4.9 mcg/dL <1.0   Cadmium Latest Ref Range: 0.0 - 4.9 ng/mL 0.3   City Unknown Test Not Performed   County Unknown Test Not Performed   Guardian First Name Unknown Test Not Performed   Guardian Last Name Unknown Test Not Performed   Home Phone Unknown Test Not Performed   [place] Latest Ref Range: 0 - 9 ng/mL 2   State Unknown Test Not Performed   Street Address Unknown Test Not Performed   Venous/Capillary Unknown Test Not Performed   Zip Unknown Test Not Performed      Results for RADHA HAHN  (MRN [id number]) as of 8/23/2019 10:10   Ref. Range 6/3/2019 10:27   Sed Rate Latest Ref Range: 0 - 36 mm/Hr 13   CRP Latest Ref Range: 0.0 - 8.2 mg/L 0.5   CPK Latest Ref Range: 20 - 180 U/L 55   CERULOPLASMIN Latest Ref Range: 15.0 - 45.0 mg/dL 33.0   Acetylchol Modul Ab Latest Units: % 0   AChR Binding Ab, Serum Latest Ref Range: <=0.02 nmol/L 0.00   MG Interpretive Comments Unknown SEE BELOW   MuSK Antibody Test Latest Ref Range: 0.00 - 0.02 nmol/L 0.00   STRIATED MUSCLE AB Latest Ref Range: <1:120 titer Negative   Aldolase Latest Ref Range: 1.2 - 7.6 U/L 3.5       Imaging:  Brain MRI w/o cerebellar degeneration or lesion - however poor quality MRI  Repeat brain MRI  FINDINGS:  MRI of the brain demonstrates no infarction, mass, hemorrhage, extra-axial collection, or other acute finding.  A skull base lesions are seen.  Following contrast administration, there is no abnormal enhancing lesion identified.  A small incidental developmental venous anomaly is seen in the right medial posterior thalamus.  Cspine w/o without central impingement    EMG  Summary   Nerve conduction study of bilateral lower extremities revealed normal antidromic sensory peak latencies, action potentials, and conduction velocities.   Motor peak latencies, amplitudes, and conduction velocities were normal. F-waves were normal.   Concentric needle examination of selected muscles in bilateral lower extremities revealed no evidence of acute or chronic denervation.   Impression   This is a normal study.     Assessment//Plan:   Problem List Items Addressed This Visit          Neuro    Multiple system atrophy - Primary    Current Assessment & Plan     Moderate progressive ataxia, worsening.  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  Dysautonomia which also fits.    Suggested continue exercising for balance -PT  Suggetsed start hobbies for manual dexterity    As her gait issues now include  trouble initiating feet, suggested low dose LDOPA  carbidopa/levodopa 25/100mg 1/2 tab PO TID     Suggested talk to SW re: long term care planning and education of her daugthers to decrease their demands on her         Relevant Orders    Ambulatory referral/consult to Home Health       Cardiac/Vascular    Orthostasis    Current Assessment & Plan     Some dizziness on standing continues  Suggested try midodrine 5mg TID  BPS not to screed 160 systolic sustained         Relevant Medications    midodrine (PROAMATINE) 5 MG Tab     Ondina Polo MD, MS  [facility]  Department of Neurology  Movement Disorders

## 2023-01-17 ENCOUNTER — PATIENT MESSAGE (OUTPATIENT)
Dept: NEUROLOGY | Facility: CLINIC | Age: 58
End: 2023-01-17
Payer: COMMERCIAL

## 2023-01-25 ENCOUNTER — PATIENT MESSAGE (OUTPATIENT)
Dept: NEUROLOGY | Facility: CLINIC | Age: 58
End: 2023-01-25
Payer: COMMERCIAL

## 2023-02-13 ENCOUNTER — OFFICE VISIT (OUTPATIENT)
Dept: NEUROLOGY | Facility: CLINIC | Age: 58
End: 2023-02-13
Payer: COMMERCIAL

## 2023-02-13 ENCOUNTER — TELEPHONE (OUTPATIENT)
Dept: NEUROLOGY | Facility: CLINIC | Age: 58
End: 2023-02-13
Payer: COMMERCIAL

## 2023-02-13 DIAGNOSIS — G23.8 MULTIPLE SYSTEM ATROPHY: ICD-10-CM

## 2023-02-13 DIAGNOSIS — I95.1 ORTHOSTASIS: ICD-10-CM

## 2023-02-13 DIAGNOSIS — R06.02 SHORTNESS OF BREATH: ICD-10-CM

## 2023-02-13 DIAGNOSIS — G90.3 MULTIPLE SYSTEM ATROPHY: ICD-10-CM

## 2023-02-13 PROCEDURE — 99215 OFFICE O/P EST HI 40 MIN: CPT | Mod: 95,,, | Performed by: PSYCHIATRY & NEUROLOGY

## 2023-02-13 PROCEDURE — 99215 PR OFFICE/OUTPT VISIT, EST, LEVL V, 40-54 MIN: ICD-10-PCS | Mod: 95,,, | Performed by: PSYCHIATRY & NEUROLOGY

## 2023-02-13 RX ORDER — MIDODRINE HYDROCHLORIDE 2.5 MG/1
7.5 TABLET ORAL
Qty: 270 TABLET | Refills: 11 | Status: SHIPPED | OUTPATIENT
Start: 2023-02-13 | End: 2023-05-08

## 2023-02-13 NOTE — ASSESSMENT & PLAN NOTE
Some dizziness on standing continues  Suggested try midodrine 7.55mg TID  BPS not to exceed 160 systolic sustained

## 2023-02-13 NOTE — TELEPHONE ENCOUNTER
Called patient to confirm virtual visit and to see if they had any questions regarding [facility]. Patient confirmed they are ready for their visit.

## 2023-02-13 NOTE — PROGRESS NOTES
The patient location is: home  The chief complaint leading to consultation is: MSA    Visit type: audiovisual    Face to Face time with patient: 20mins  20 minutes of total time spent on the encounter, which includes face to face time and non-face to face time preparing to see the patient (eg, review of tests), Obtaining and/or reviewing separately obtained history, Documenting clinical information in the electronic or other health record, Independently interpreting results (not separately reported) and communicating results to the patient/family/caregiver, or Care coordination (not separately reported).     Each patient to whom he or she provides medical services by telemedicine is:  (1) informed of the relationship between the physician and patient and the respective role of any other health care provider with respect to management of the patient; and (2) notified that he or she may decline to receive medical services by telemedicine and may withdraw from such care at any time.    Notes:     [facility] Followup  PCP/Referring Provider: No referring provider defined for this encounter.  Date of Service: 4/4/2023    Chief Complaint: Ataxia    Interval Hx    Since last visit,   Declined significantly in ADLs  Feels like her daughters have a poor understanding of her disabilities  Still Dizzy on standing  Restarted carbidopa/levodopa temporarily but felt dizzy  Taking midodrine BID 5mg   Still dizzy on standing    Systolics do not exceed 100mm HG most days    Still Stopped working due to falls  Walker inhome can walk 3-4 feet- and motorized scooter for distance    Dyarthria worse   Falls weekly at least  Doing PT self guided    No stridor  No change to sweating  Does have REM sleep behavior    Struggling to write  Can no longer do stairs.  Still having trouble writing checks   strength decreased   Speech is progressively slurred    Feels like memory is poor at times - trouble recalling names  Mild  "dyphagia    Workup  MRI brain showed decrease in Pontine contour suggesting of MSA  PAGE scans showed normal uptake  EMG was normal 2019  CT C/AB/PEL was neg for malignancy    MELVIN 65 NL  TPO POS  Notes her son was diagnosed with PANDAS and "has funny eye movements" - poor fine motor skills as a kid    Does have REM sleep behavior issues for 3 months    PD Review of Symptoms:  Anosmia: None  Dysarthria/Hypophonia: None  Dysphagia/Sialorrhea: None  Hallucinations: None  Depression: None  Cognitive slowing: None  Urinary changes: None  Constipation: at times  Orthostasis: none  Falls: as above  Micrographia: none  Sleep issues:  -SUSAN: at times snores  -RBD: Talks and punches in her sleep  -Sleep Quality: good, wakes every 3 hours"    "PriorHPI: Nirali Kirkpatrick is a L HANDED 57 y.o. female with a medical issues significant for HTN, Anxiety, Vit B12 deficiency, who presents with ataxia for 1.5 years. She first noted 1.5 years ago her R foot was dragging and every 3-4 steps she would catch that toe. She noticed her R foot would catch on curbs. Going down steps is especially hard - she mis-steps often. She feels like she has progressively been imbalanced. Her first fall was a year ago. She has fallen more often since then. She falls twice a month now. Falls typically sideways. She holds onto her  for stability but does not use an assist device. Tends to have more ataxia after she's tired. She occasionally has a had tremor in her hands when starting IVs. She does at times feel clumsy with her hands. No spinning vertigo. No lightheadedness. No visual issues.    No double vision, eye drooping, head drop, dysphagia.  She was told her B12 was low normal Aug 2018, and has had 4 shots since    No dysarthria.    She saw a general neurologist  B6 - NL  B12 - 333    Brain MRI read as normal  Spine and Tspine 2018 - no central disc issues  Has not had an EMG    No fam hx ataxia, MS  Uncle has M. Gravis  Mother and brother have " had tremors      Review of Systems:   Review of Systems   Constitutional:  Negative for fever.   HENT:  Negative for congestion.    Eyes:  Negative for double vision.   Respiratory:  Negative for cough and shortness of breath.    Cardiovascular:  Negative for chest pain and leg swelling.   Gastrointestinal:  Negative for nausea.   Genitourinary:  Negative for dysuria.   Musculoskeletal:  Positive for falls.   Skin:  Negative for rash.   Neurological:  Positive for tremors. Negative for speech change and headaches.   Psychiatric/Behavioral:  Negative for depression.        Current Medications:  Outpatient Encounter Medications as of 2/13/2023   Medication Sig Dispense Refill    b complex vitamins tablet Take 1 tablet by mouth once daily.      carbidopa-levodopa  mg (SINEMET)  mg per tablet Take 1 tablet by mouth 3 (three) times daily. 90 tablet 11    clonazePAM (KLONOPIN) 0.5 MG tablet Take 1 tablet by mouth twice daily as needed for anxiety 30 tablet 0    cyanocobalamin 1,000 mcg/mL injection 1,000 mcg every 30 days.       desvenlafaxine succinate (PRISTIQ) 100 MG Tb24 100 mg every evening.       midodrine (PROAMATINE) 2.5 MG Tab Take 3 tablets (7.5 mg total) by mouth 3 (three) times daily with meals. 270 tablet 11    midodrine (PROAMATINE) 5 MG Tab Take 1 tablet (5 mg total) by mouth 3 (three) times daily with meals. 270 tablet 01    mirabegron (MYRBETRIQ) 50 mg Tb24 Take 1 tablet (50 mg total) by mouth once daily. 30 tablet 11    multivitamin capsule Take 1 capsule by mouth once daily.      paroxetine (PAXIL) 20 MG tablet Take 20 mg by mouth every morning.      tolterodine (DETROL LA) 4 MG 24 hr capsule Take 1 capsule (4 mg total) by mouth once daily. 30 capsule 8     No facility-administered encounter medications on file as of 2/13/2023.       Past Medical History:  HTN    Past Surgical History:  Gallbladder, gastric sleeve    Current Living Situation: home    Social:  Social History     Socioeconomic  "History    Marital status:    Tobacco Use    Smoking status: Never    Smokeless tobacco: Never   Substance and Sexual Activity    Alcohol use: Not Currently    Drug use: Never   Social History Narrative    ** Merged History Encounter **            Family History:  As above    PHYSICAL:  There were no vitals taken for this visit.    Physical Exam  Constitutional: Well-developed, well-nourished, appears stated age  Eyes: No scleral icterus  ENT: Moist oral mucosa  Cardiovascular: No lower extremity edema   Respiratory: No labored breathing   Skin: No rash   Hematologic: No bruising    Other: GI/ deferred   Mental status: Alert and oriented to person, place, time, and situation;   follows commands  Speech: mod dysarthric), no aphasia  Cranial nerves:            CN II: Pupils mid-position and equal, not tested light or accommodation  CN III, IV, VI: Extraocular movements full, no nystagmus visualized  CN V: Not tested   CN VII: Face strong and symmetric bilaterally   CN VIII: Hearing intact to voice and conversation   CN IX, X: Palate raises midline and symmetric   CN XI: Strong shoulder shrug B/L  CN XII: Tongue appears midline   Motor: Normal bulk by appearance, no drift   Sensory: Not tested    Gait: Not tested  Deep tendon reflexes: Not tested  Movement/Coordination                    No hypophonic speech.                     No facial masking.  Mild bradykinesia.  Mild L hand resting tremor  FNF R>L intention tremor  No stridor                  Bradykinesia  ? Finger taps Finger flicks PEDRO Heel taps   Left 0 0 0 0   Right 1+ 0 0 0       Tremor Exam   Arms extended Arms in wing position, fingers almost touching Re-emergent Arms extended wrists extended Intention Resting Kinetic   Left ? ? ?+ ? ?+ ? ?   Right ? ? ? ? ?+ ? ?   Head tremor: No-No Yes-Yes NE        "General Medical Examination:  General: Good hygiene, appropriate appearance.  HEENT: Normocephalic, atraumatic.   Neck: Supple.   Chest: Unlabored " "breathing.   CV: Symmetric pulses.   Ext: No clubbing, cyanosis, or edema.     Mental Status:  Mood/Affect: Appropriate/congruent.  Level of consciousness: Awake, alert.  Orientation: Oriented to person, place, time and situation.  Language: Mod Dysarthria    Cranial nerves:  I: Not tested  II: PERRL, VFF to counting  III, IV, VI: EOMI with conjugate gaze and no nystagmus on end gaze - no SWJs  V: Facial sensation intact and symmetric over the bilateral V1-V3  VII: Facial muscle activation intact and symmetric over the bilateral upper and lower face  VIII: Hearing intact in the b/l ears and symmetrical to finger rub  IX, X, XII: TUP midline - no atrophy or fasiculations  X: SCMs and shoulder shrug full strength b/l and symmetric  Trouble with PA PA TA TA  Trouble with GAs and Naomy      Motor:   Cannot squeeze and hold 's hands   When lifting her knees she cannot overcome her  pushing it down  No stridor    Hyperreflexic 3+ at pattelae    ? Finger taps Finger flicks PEDRO Heel taps   Left nl nl nl nl   Right nl nl Incoordinated nl   Neck tone: nl  ? Arm Leg   Left nl nl   Right nl nl         Coordination:   -Finger to nose: intention tremor mild bilat  Past-points 1 inch R hand >L hand  Disdiatochokinesias R hand moderate  Disdiatochokinesias L hand mild  -Heel to shin: mild issues R foot  R hand spiral +++  L hand spiral ++    No resting or postural tremor    Gait:  -Arises from chair without use of hands.  -Casual gait is: wide based, somewhat stiff and staggering, circumducts, waves moderately L to R when she walks - mod ataxic   -Stride length: nl  -Arm Swing: decreased R  -Turning: wide  -Tandem gait: cannot  No foot drop"      Laboratory Data:  Results for RADHA HAHN (MRN [id number]) as of 6/3/2019 09:52   Ref. Range 5/2/2019 11:23   Folate Latest Ref Range: 4.0 - 24.0 ng/mL 10.7   Vitamin B-12 Latest Ref Range: 180 - 914 ng/L 525   Sodium Latest Ref Range: 136 - 145 mmol/L 141   Potassium " Latest Ref Range: 3.5 - 5.1 mmol/L 3.7   Chloride Latest Ref Range: 95 - 110 mmol/L 106   CO2 Latest Ref Range: 23 - 29 mmol/L 26   Anion Gap Latest Ref Range: 8 - 16 mmol/L 9   BUN, Bld Latest Ref Range: 6 - 20 mg/dL 22 (H)   Creatinine Latest Ref Range: 0.5 - 1.4 mg/dL 0.8   eGFR if non African American Latest Ref Range: >60 mL/min/1.73 m^2 >60.0   eGFR if African American Latest Ref Range: >60 mL/min/1.73 m^2 >60.0   Glucose Latest Ref Range: 70 - 110 mg/dL 92   Calcium Latest Ref Range: 8.7 - 10.5 mg/dL 9.8   Alkaline Phosphatase Latest Ref Range: 55 - 135 U/L 86   PROTEIN TOTAL Latest Ref Range: 6.0 - 8.4 g/dL 7.4   Albumin Latest Ref Range: 3.5 - 5.2 g/dL 4.2   BILIRUBIN TOTAL Latest Ref Range: 0.1 - 1.0 mg/dL 0.6   AST Latest Ref Range: 10 - 40 U/L 19   ALT Latest Ref Range: 10 - 44 U/L 16   Ammonia Latest Ref Range: 10 - 50 umol/L 26   Arsenic Latest Ref Range: 0 - 12 ng/mL <1   Thiamine Latest Ref Range: 38 - 122 ug/L 63   Vitamin E Latest Ref Range: 500 - 1800 ug/dL 1302   Glutamic Acid Decarb Ab Latest Ref Range: <=0.02 nmol/L 0.00   TSH Latest Ref Range: 0.400 - 4.000 uIU/mL 0.649   PTH Latest Ref Range: 9.0 - 77.0 pg/mL 103.0 (H)   Race Unknown Test Not Performed   AChR Binding Ab, Serum Latest Ref Range: <=0.02 nmol/L 0.00   AChR Ganglionic Neuronal Ab Latest Ref Range: <=0.02 nmol/L 0.00   CRMP-5 IgG Latest Ref Range: <1:240 titer Negative   Neuronal (V-G) K+ Channel Ab, Serum Latest Ref Range: <=0.02 nmol/L 0.00   NMO Interpretive Comments Unknown SEE BELOW   N-Type Calcium Channel Ab Latest Ref Range: <=0.03 nmol/L 0.00   P/Q Type Calcium Channel Ab Latest Ref Range: <=0.02 nmol/L 0.00   PAVAL AGNA-1, Serum Latest Ref Range: <1:240 titer Negative   PAVAL TOBY-1, Serum Latest Ref Range: <1:240 titer Negative   PAVAL TOBY-2, Serum Latest Ref Range: <1:240 titer Negative   PAVAL TOBY-3, Serum Latest Ref Range: <1:240 titer Negative   PAVAL reflex test added Unknown None.   PAVAL,  Amphiphysin Ab, Serum  Latest Ref Range: <1:240 titer Negative   PAVAL, PCA-1, Serum Latest Ref Range: <1:240 titer Negative   PAVAL, PCA-2, Serum Latest Ref Range: <1:240 titer Negative   PAVAL, PCA-Tr, Serum Latest Ref Range: <1:240 titer Negative   STRIATED MUSCLE AB Latest Ref Range: <1:120 titer Negative   Lead Latest Ref Range: 0.0 - 4.9 mcg/dL <1.0   Cadmium Latest Ref Range: 0.0 - 4.9 ng/mL 0.3   City Unknown Test Not Performed   County Unknown Test Not Performed   Guardian First Name Unknown Test Not Performed   Guardian Last Name Unknown Test Not Performed   Home Phone Unknown Test Not Performed   [place] Latest Ref Range: 0 - 9 ng/mL 2   State Unknown Test Not Performed   Street Address Unknown Test Not Performed   Venous/Capillary Unknown Test Not Performed   Zip Unknown Test Not Performed      Results for RADHA HAHN (MRN [id number]) as of 8/23/2019 10:10   Ref. Range 6/3/2019 10:27   Sed Rate Latest Ref Range: 0 - 36 mm/Hr 13   CRP Latest Ref Range: 0.0 - 8.2 mg/L 0.5   CPK Latest Ref Range: 20 - 180 U/L 55   CERULOPLASMIN Latest Ref Range: 15.0 - 45.0 mg/dL 33.0   Acetylchol Modul Ab Latest Units: % 0   AChR Binding Ab, Serum Latest Ref Range: <=0.02 nmol/L 0.00   MG Interpretive Comments Unknown SEE BELOW   MuSK Antibody Test Latest Ref Range: 0.00 - 0.02 nmol/L 0.00   STRIATED MUSCLE AB Latest Ref Range: <1:120 titer Negative   Aldolase Latest Ref Range: 1.2 - 7.6 U/L 3.5       Imaging:  Brain MRI w/o cerebellar degeneration or lesion - however poor quality MRI  Repeat brain MRI  FINDINGS:  MRI of the brain demonstrates no infarction, mass, hemorrhage, extra-axial collection, or other acute finding.  A skull base lesions are seen.  Following contrast administration, there is no abnormal enhancing lesion identified.  A small incidental developmental venous anomaly is seen in the right medial posterior thalamus.  Cspine w/o without central impingement    EMG  Summary   Nerve conduction study of bilateral lower  extremities revealed normal antidromic sensory peak latencies, action potentials, and conduction velocities.   Motor peak latencies, amplitudes, and conduction velocities were normal. F-waves were normal.   Concentric needle examination of selected muscles in bilateral lower extremities revealed no evidence of acute or chronic denervation.   Impression   This is a normal study.     Assessment//Plan:   Problem List Items Addressed This Visit          Neuro    Multiple system atrophy    Current Assessment & Plan     Moderate progressive ataxia, worsening.  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  Dysautonomia which also fits.    Suggested continue exercising for balance -PT  Suggetsed start hobbies for manual dexterity    As her gait issues now include trouble initiating feet, suggested low dose LDOPA  carbidopa/levodopa 25/100mg 1/2 tab PO TID  After BP controlled    Suggested talk to SW re: long term care planning and education of her daugthers to decrease their demands on her              Pulmonary    Shortness of breath    Current Assessment & Plan     Severe advancing SOB from MSA-  We are ordering NIV for nocturnal and daytime use to decrease the risk of exacerbation.  Home bipap will be insufficient due to the severity of her illness              Cardiac/Vascular    Orthostasis    Current Assessment & Plan     Some dizziness on standing continues  Suggested try midodrine 7.55mg TID  BPS not to exceed 160 systolic sustained            Ondina Polo MD, MS [facility]  Department of Neurology  Movement Disorders

## 2023-02-13 NOTE — ASSESSMENT & PLAN NOTE
Moderate progressive ataxia, worsening.  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  Dysautonomia which also fits.    Suggested continue exercising for balance -PT  Suggetsed start hobbies for manual dexterity    As her gait issues now include trouble initiating feet, suggested low dose LDOPA  carbidopa/levodopa 25/100mg 1/2 tab PO TID  After BP controlled    Suggested talk to SW re: long term care planning and education of her daugthers to decrease their demands on her

## 2023-02-21 ENCOUNTER — PATIENT MESSAGE (OUTPATIENT)
Dept: NEUROLOGY | Facility: CLINIC | Age: 58
End: 2023-02-21
Payer: COMMERCIAL

## 2023-03-08 ENCOUNTER — PATIENT MESSAGE (OUTPATIENT)
Dept: NEUROLOGY | Facility: CLINIC | Age: 58
End: 2023-03-08
Payer: COMMERCIAL

## 2023-03-09 ENCOUNTER — PATIENT MESSAGE (OUTPATIENT)
Dept: NEUROLOGY | Facility: CLINIC | Age: 58
End: 2023-03-09
Payer: COMMERCIAL

## 2023-03-10 ENCOUNTER — PATIENT MESSAGE (OUTPATIENT)
Dept: NEUROLOGY | Facility: CLINIC | Age: 58
End: 2023-03-10
Payer: COMMERCIAL

## 2023-03-10 ENCOUNTER — TELEPHONE (OUTPATIENT)
Dept: NEUROLOGY | Facility: CLINIC | Age: 58
End: 2023-03-10
Payer: COMMERCIAL

## 2023-03-10 NOTE — TELEPHONE ENCOUNTER
----- Message from Haley Veras MA sent at 3/10/2023  9:54 AM CST -----  Regarding: virtual appt  Contact: pt [phone]  Patient would like to set up a virtual appointment   she will be in [place] at the time of the virtual appointment.  Please call     [phone]

## 2023-03-13 ENCOUNTER — TELEPHONE (OUTPATIENT)
Dept: NEUROLOGY | Facility: CLINIC | Age: 58
End: 2023-03-13
Payer: COMMERCIAL

## 2023-03-13 NOTE — PROGRESS NOTES
Called pt re: breathing issues. My team will inquire re: pulmonary/ sleep med specialists who may find her a CPAP. She knows to visit her closest major medical center if this symptom persists.

## 2023-03-13 NOTE — TELEPHONE ENCOUNTER
[facility]  [address] , [address]    Pulmonary CC clinic  Sleep med   [facility]  [address]    Dr. Scot Ruano     [phone]    F [phone]    1 PM. Arrival    Dr Cooley      Spoke to patient. She is unable to make this appt. She will call to r/s for as soon as possible.

## 2023-03-14 NOTE — TELEPHONE ENCOUNTER
----- Message from Ashley Clark sent at 3/13/2023 12:42 PM CDT -----  Regarding: Appt  Contact: Pt [phone]  Patient requesting a call back states a consult was set up on her behalf she is unsure of where to go Please call to discuss further

## 2023-03-16 ENCOUNTER — TELEPHONE (OUTPATIENT)
Dept: NEUROLOGY | Facility: CLINIC | Age: 58
End: 2023-03-16
Payer: COMMERCIAL

## 2023-03-16 ENCOUNTER — TELEPHONE (OUTPATIENT)
Dept: PULMONOLOGY | Facility: CLINIC | Age: 58
End: 2023-03-16
Payer: COMMERCIAL

## 2023-03-16 DIAGNOSIS — G23.8 MULTIPLE SYSTEM ATROPHY: Primary | ICD-10-CM

## 2023-03-16 DIAGNOSIS — R06.02 SHORTNESS OF BREATH: ICD-10-CM

## 2023-03-16 DIAGNOSIS — R06.1 STRIDOR: ICD-10-CM

## 2023-03-16 DIAGNOSIS — G90.3 MULTIPLE SYSTEM ATROPHY: Primary | ICD-10-CM

## 2023-03-16 NOTE — TELEPHONE ENCOUNTER
----- Message from Ambreen Link CMA sent at 3/16/2023  4:22 PM CDT -----  Regarding: PFT order appt request  Contact: Dr. Stanley Sellers,    Dr. Angel called to schedule a PFT.  He would also like MIP, MEP and supine FVC.     Please call Dr. Angel at [phone] with any questions.  Please call pt to schedule.    Thank you

## 2023-03-16 NOTE — TELEPHONE ENCOUNTER
----- Message from Ondina Polo MD sent at 3/16/2023 12:35 PM CDT -----  Regarding: FW: CPAP  Priya I'd like to take our Pulm team up on an earlier PFT  Can you help inform the patient while I let Dr. Angel know we'd like this?    ----- Message -----  From: Sundeep Angel MD  Sent: 3/15/2023   5:04 PM CDT  To: Ondina Polo MD  Subject: RE: CPAP                                         No problem, glad to help.  I would be worried about waiting a month. If she can travel , I can talk with the [facility] sleep lab to try to get her in for PFTs this week.   ----- Message -----  From: Ondina Polo MD  Sent: 3/15/2023  10:54 AM CDT  To: Sundeep Angel MD  Subject: RE: BRIE Urbano thank you so much for your insight  She was seen urgently by a Pulm clinic in [place] yesterday (Dr. Estevez). They suggested a AVAPS and a PFT in 1 month. I have not dealt with this enough to know if that's appropriate in this situation.  ----- Message -----  From: Sundeep Angel MD  Sent: 3/13/2023   6:00 PM CDT  To: Ondina Polo MD, Abdi Cevallos MD  Subject: RE: CPAP                                         Another thought, It might be possible to get her a home vent (trilogy) for chronic respiratory failure  if you signify that she may die without intermittent daytime ventilation.  This may be overkill, but sometimes is the quickest way.  ----- Message -----  From: Ondina Polo MD  Sent: 3/10/2023   5:30 PM CDT  To: Sundeep Angel MD, Abdi Cevallos MD  Subject: CPAP                                             Gabriel Matt. Movement Disorders Neuro here.  I have a patient with multiple systems atrophy who is developing stridor. I think she is in urgent need of a CPAP but I'm [place] sure [place] [facility] [place] way to providing her one. Can you help direct me please?    Article below.  [email or web address]/content/[email or web address]

## 2023-03-16 NOTE — TELEPHONE ENCOUNTER
Left patient a voicemail that she may be hearing from the pulmonary or sleep lab to set up PFTs here at [facility]. This should allow testing to be done sooner.

## 2023-03-17 DIAGNOSIS — G23.8 MULTIPLE SYSTEM ATROPHY: Primary | ICD-10-CM

## 2023-03-17 DIAGNOSIS — G90.3 MULTIPLE SYSTEM ATROPHY: Primary | ICD-10-CM

## 2023-03-20 ENCOUNTER — PATIENT MESSAGE (OUTPATIENT)
Dept: NEUROLOGY | Facility: CLINIC | Age: 58
End: 2023-03-20
Payer: COMMERCIAL

## 2023-03-21 ENCOUNTER — TELEPHONE (OUTPATIENT)
Dept: NEUROLOGY | Facility: CLINIC | Age: 58
End: 2023-03-21
Payer: COMMERCIAL

## 2023-03-21 NOTE — TELEPHONE ENCOUNTER
Followed up with Pt per Dr. Polo request. Pt reports appointment with PFT lab on Friday at 9 am at [facility]. Instructed to call if she has any questions/concerns.

## 2023-03-24 ENCOUNTER — HOSPITAL ENCOUNTER (OUTPATIENT)
Dept: PULMONOLOGY | Facility: CLINIC | Age: 58
Discharge: HOME | End: 2023-03-24
Payer: COMMERCIAL

## 2023-03-24 DIAGNOSIS — G23.8 MULTIPLE SYSTEM ATROPHY: ICD-10-CM

## 2023-03-24 DIAGNOSIS — R06.1 STRIDOR: ICD-10-CM

## 2023-03-24 DIAGNOSIS — R06.02 SHORTNESS OF BREATH: ICD-10-CM

## 2023-03-24 DIAGNOSIS — G90.3 MULTIPLE SYSTEM ATROPHY: ICD-10-CM

## 2023-03-24 PROCEDURE — 94799 UNLISTED PULMONARY SVC/PX: CPT | Mod: S$GLB,,, | Performed by: INTERNAL MEDICINE

## 2023-03-24 PROCEDURE — 94010 BREATHING CAPACITY TEST: CPT | Mod: S$GLB,,, | Performed by: INTERNAL MEDICINE

## 2023-03-24 PROCEDURE — 94010 BREATHING CAPACITY TEST: ICD-10-PCS | Mod: S$GLB,,, | Performed by: INTERNAL MEDICINE

## 2023-03-24 PROCEDURE — 94799 PR NIF/PIF PULMONARY FUNCTION TEST: ICD-10-PCS | Mod: S$GLB,,, | Performed by: INTERNAL MEDICINE

## 2023-03-27 ENCOUNTER — PATIENT MESSAGE (OUTPATIENT)
Dept: NEUROLOGY | Facility: CLINIC | Age: 58
End: 2023-03-27
Payer: COMMERCIAL

## 2023-03-28 ENCOUNTER — DOCUMENTATION ONLY (OUTPATIENT)
Dept: NEUROLOGY | Facility: CLINIC | Age: 58
End: 2023-03-28
Payer: COMMERCIAL

## 2023-03-28 PROBLEM — R06.00 DYSPNEA: Status: RESOLVED | Noted: 2023-03-28 | Resolved: 2023-03-28

## 2023-03-28 PROBLEM — R06.00 DYSPNEA: Status: ACTIVE | Noted: 2023-03-28

## 2023-03-28 LAB
FEF 25 75 LLN: 1.34
FEF 25 75 LLN: 1.34
FEF 25 75 PRE REF: 52.2 %
FEF 25 75 PRE REF: 88.9 %
FEF 25 75 REF: 2.54
FEF 25 75 REF: 2.54
FEV05 LLN: 1.17
FEV05 LLN: 1.17
FEV05 REF: 2.03
FEV05 REF: 2.03
FEV1 FVC LLN: 68
FEV1 FVC LLN: 68
FEV1 FVC PRE REF: 103.9 %
FEV1 FVC PRE REF: 105.9 %
FEV1 FVC REF: 79
FEV1 FVC REF: 79
FEV1 LLN: 2.16
FEV1 LLN: 2.16
FEV1 PRE REF: 55.9 %
FEV1 PRE REF: 71.4 %
FEV1 REF: 2.83
FEV1 REF: 2.83
FVC LLN: 2.75
FVC LLN: 2.75
FVC PRE REF: 53.4 %
FVC PRE REF: 66.9 %
FVC REF: 3.6
FVC REF: 3.6
MEP PRE REF: 54 %
MEP PRE: 43.02 CMH2O (ref 63.23–96.78)
MEP REF: 80
MIP PRE REF: 51 %
MIP PRE: 25.3 CMH2O (ref 33.23–66.78)
MIP REF: 50
PEF LLN: 5.04
PEF LLN: 5.04
PEF PRE REF: 47.8 %
PEF PRE REF: 49 %
PEF REF: 6.92
PEF REF: 6.92
PHYSICIAN COMMENT: ABNORMAL
PHYSICIAN COMMENT: ABNORMAL
PRE FEF 25 75: 1.33 L/S (ref 1.34–4.14)
PRE FEF 25 75: 2.26 L/S (ref 1.34–4.14)
PRE FET 100: 2.86 SEC
PRE FET 100: 3.55 SEC
PRE FEV05 REF: 61.1 %
PRE FEV05 REF: 74.6 %
PRE FEV1 FVC: 82.35 % (ref 67.55–89.17)
PRE FEV1 FVC: 83.93 % (ref 67.55–89.17)
PRE FEV1: 1.58 L (ref 2.16–3.48)
PRE FEV1: 2.02 L (ref 2.16–3.48)
PRE FEV5: 1.24 L (ref 1.17–2.88)
PRE FEV5: 1.51 L (ref 1.17–2.88)
PRE FVC: 1.92 L (ref 2.75–4.48)
PRE FVC: 2.41 L (ref 2.75–4.48)
PRE PEF: 3.31 L/S (ref 5.04–8.8)
PRE PEF: 3.39 L/S (ref 5.04–8.8)

## 2023-03-28 NOTE — PROGRESS NOTES
Contacted pulmonary re: recent PFTS  Concern for stridor and respiratory failure in patient with MSA with greater concern for increasing possibility of sudden death without night-time and intercurrent daytime respiratory support via home ventilator.    We are ordering NIV for nocturnal and daytime use to decrease the risk of exacerbation.  Home bipap will be insufficient due to the severity of her illness

## 2023-03-30 DIAGNOSIS — G90.3 MULTIPLE SYSTEM ATROPHY: ICD-10-CM

## 2023-03-30 DIAGNOSIS — R06.1 STRIDOR: ICD-10-CM

## 2023-03-30 DIAGNOSIS — G23.8 MULTIPLE SYSTEM ATROPHY: ICD-10-CM

## 2023-03-30 DIAGNOSIS — J96.10 CHRONIC RESPIRATORY FAILURE, UNSPECIFIED WHETHER WITH HYPOXIA OR HYPERCAPNIA: Primary | ICD-10-CM

## 2023-04-04 PROBLEM — R06.02 SHORTNESS OF BREATH: Status: ACTIVE | Noted: 2023-03-28

## 2023-04-04 NOTE — ASSESSMENT & PLAN NOTE
History   Chief Complaint:  Fall    The history is provided by the EMS personnel.      Margy Babin is a 89 year old female with a history of hypertension, hyperlipidemia, and anxiety who presents by EMS alone for a fall. Per EMS report, the patient came from an independent living home where she was found in the bathroom by the building security. The patient was down for about 15 hours and was covered in feces, as she was unable to get up. She was also confused.     Review of Systems   Unable to perform ROS: Mental status change     Allergies:  The patient denies having any allergies.    Medications:  ALPRAZolam   amLODIPine   bisacodyl   Calcium Carbonate   Cholecalciferol  cycloSPORINE  docusate sodium   lisinopril   metoprolol succinate   ondansetron   oxybutynin   PARoxetine   QUEtiapine   ramelteon    Past Medical History:     Anxiety   Blepharitis of both eyes   Depression, major, recurrent, in complete remission  Dry eye syndrome   Dyspnea on exertion   Essential hypertension   Familial tremor   Insomnia, unspecified type   Mixed hyperlipidemia   Nausea without vomiting   Tremor, hereditary, benign   Familial tremor  Ceruminosis, bilateral      Past Surgical History:    Hysterectomy total abdominal, bilateral salpingo-oophorectomy, combined  Neck surgery  Rotator cuff repair rt/lt  ZZC total hip arthrosplasty     Family History:    Heart disease - Father  CAD - Father  Breast cancer - Daughter  Emphysema - Sister  Heart disease - Sister    Social History:  The patient presents by EMS alone.  The patient has a daughter.    Physical Exam     Patient Vitals for the past 24 hrs:   BP Temp Temp src Pulse Resp SpO2 Weight   01/06/22 1700 (!) 156/90 -- -- (!) 130 19 -- --   01/06/22 1505 -- -- -- (!) 123 21 -- --   01/06/22 1500 (!) 146/71 -- -- (!) 129 20 96 % --   01/06/22 1450 -- -- -- -- -- 96 % --   01/06/22 1445 137/61 -- -- (!) 128 -- 96 % --   01/06/22 1420 (!) 150/76 -- -- (!) 137 18 95 % --  Severe advancing SOB from MSA-  We are ordering NIV for nocturnal and daytime use to decrease the risk of exacerbation.  Home bipap will be insufficient due to the severity of her illness       01/06/22 1415 (!) 127/93 -- -- (!) 135 22 92 % --   01/06/22 1410 (!) 134/101 -- -- (!) 131 28 -- --   01/06/22 1405 (!) 154/81 -- -- (!) 210 23 -- --   01/06/22 1400 (!) 136/104 -- -- (!) 138 28 -- --   01/06/22 1355 (!) 153/141 -- -- (!) 141 23 -- --   01/06/22 1345 -- -- -- -- -- -- 62 kg (136 lb 11 oz)   01/06/22 1340 -- 97.4  F (36.3  C) Rectal (!) 125 -- -- --   01/06/22 1339 -- -- -- (!) 128 20 -- --   01/06/22 1335 103/87 -- -- 67 -- 92 % --     Physical Exam  Constitutional: elderly white female, supine, no respiratory distress  HENT: No bruising or swelling. Dry black material on tongue and lips.  Eyes: EOM are normal. Pupils are equal, round, and reactive to light.   Neck: Normal range of motion. No JVD present. No cervical adenopathy. No posterior midline tenderness.  Cardiovascular: Rapid, irregular, regular.  Exam reveals no gallop and no friction rub.    No murmur heard.  Pulmonary/Chest: Bilateral breath sounds normal. No wheezes, rhonchi or rales.  Abdominal: Tenderness. No rebound or guarding. 1+ fibril pulses  Musculoskeletal: No edema. No tenderness.   Lymphadenopathy: No lymphadenopathy.   Neurological: Alert and oriented to person, place, and time. Normal strength. Coordination normal. GCS15.   Skin: Skin is warm and dry. No rash noted. No erythema.   Rectal: No blood.    Emergency Department Course   ECG  ECG obtained at 1345, ECG read at 1354  Trial fibrillation with rapid ventricular response with premature ventricular or aberrantly conducted complexes  Left axis deviation  Left bundle branch block   No significant changes to compare to as 12/25/2020.  Rate 135 bpm. IL interval 0 ms. QRS duration 130 ms. QT/QTc 364/546 ms. P-R-T axes 0 -49 127.     Imaging:  XR Chest Port 1 View   Preliminary Result   IMPRESSION: No acute disease.       CT Head w/o Contrast    (Results Pending)   CT Abdomen Pelvis w/o Contrast    (Results Pending)     Report per radiology    Laboratory:  Labs Ordered and  Resulted from Time of ED Arrival to Time of ED Departure   ISTAT GASES LACTATE VENOUS POCT - Abnormal       Result Value    Lactic Acid POCT 3.1 (*)     Bicarbonate Venous POCT 22      O2 Sat, Venous POCT 17 (*)     pCO2V Venous POCT 40      pH Venous POCT 7.35      pO2 Venous POCT 15 (*)    INR - Abnormal    INR 1.27 (*)    COMPREHENSIVE METABOLIC PANEL - Abnormal    Sodium 143      Potassium 3.8      Chloride 111 (*)     Carbon Dioxide (CO2) 23      Anion Gap 9      Urea Nitrogen 67 (*)     Creatinine 2.51 (*)     Calcium 9.4      Glucose 133 (*)     Alkaline Phosphatase 108       (*)     ALT 85 (*)     Protein Total 8.1      Albumin 3.5      Bilirubin Total 0.9      GFR Estimate 17 (*)    TROPONIN I - Abnormal    Troponin I High Sensitivity 83 (*)    ROUTINE UA WITH MICROSCOPIC REFLEX TO CULTURE - Abnormal    Color Urine Orange (*)     Appearance Urine Cloudy (*)     Glucose Urine Negative      Bilirubin Urine Negative      Ketones Urine Negative      Specific Gravity Urine 1.020      Blood Urine Large (*)     pH Urine 5.0      Protein Albumin Urine 70  (*)     Urobilinogen Urine Normal      Nitrite Urine Negative      Leukocyte Esterase Urine Large (*)     Bacteria Urine Many (*)     WBC Clumps Urine Present (*)     Mucus Urine Present (*)     RBC Urine 22 (*)     WBC Urine 67 (*)     Squamous Epithelials Urine 2 (*)    CBC WITH PLATELETS AND DIFFERENTIAL - Abnormal    WBC Count 33.1 (*)     RBC Count 5.21 (*)     Hemoglobin 15.4      Hematocrit 49.1 (*)     MCV 94      MCH 29.6      MCHC 31.4 (*)     RDW 14.5      Platelet Count 373     ISTAT CREATININE POCT - Abnormal    Creatinine POCT 2.7 (*)     GFR, ESTIMATED POCT 16 (*)    DIFFERENTIAL - Abnormal    % Neutrophils 86      % Lymphocytes 7      % Monocytes 6      % Eosinophils 1      % Basophils 0      Absolute Neutrophils 28.5 (*)     Absolute Lymphocytes 2.3      Absolute Monocytes 2.0 (*)     Absolute Eosinophils 0.3      Absolute Basophils 0.0       RBC Morphology Confirmed RBC Indices      Platelet Assessment        Value: Automated Count Confirmed. Platelet morphology is normal.   CK TOTAL - Abnormal    CK 2,103 (*)    LIPASE - Normal    Lipase 82     COVID-19 VIRUS (CORONAVIRUS) BY PCR - Normal    SARS CoV2 PCR Negative     LACTIC ACID WHOLE BLOOD - Normal    Lactic Acid 1.4     LIPASE   TYPE AND SCREEN, ADULT    ABO/RH(D) O POS      Antibody Screen Negative      SPECIMEN EXPIRATION DATE [id number]     BLOOD CULTURE   BLOOD CULTURE   URINE CULTURE   ABO/RH TYPE AND SCREEN     Emergency Department Course:  Reviewed:  I reviewed nursing notes, vitals, past medical history    Assessments:  1330 I obtained history and examined the patient as noted above.   1405 I rechecked the patient to do a rectal exam.    Consults:  1705 I consulted with Dr. Jean MD from [facility].    Interventions:  1415 0.9% sodium chloride BOLUS, IV  1417 Pantoprazole 40 mg, IV  1457 HYDROmorphone, 0.2 mg, IV  1511 piperacillin-tazobactam 2.25 g, IV    Disposition:  The patient was admitted to the hospital under the care of Dr. Jean MD from [facility].    Impression & Plan     Medical Decision Making:  Margy Babin is a 89 year old female who was found down in the shower at her independent living facility. She states she was in her bed and does not remember going into the shower, but security did come by and find her. She was suddenly confused, she had no focal complaints, and was tachycardic in atrial fibrillation. She denies hitting her head, having abdominal pain, chest pain, diarrhea, or urinary symptoms. Her head shows no signs of obvious trauma and she has no neck pain, chest or abdominal discomfort. Her rhythm is consistent with afib. She does have a mild diffuse abdominal discomfort on exam. Rectal shows no stool. There is dark material on her lips and tongue. Her knees appear to show bruising. The patient was brought to the Stabilization room where she  was seen urgently. She did not need stroke criteria, IV was started with fluids, and sepsis protocol was initiated. White count was high, lactic came back positive. CT of the head and abdomen were obtained and I see no acute bleeding on the head scan and abdominal scan and I see no obvious mass. However, the official results are pending. The patient received 2 full liters of fluid, her lactate is being repeated. She is to remain in atrial fibration in the 120s, which I think is more secondary to undergoing symptoms. I do not think bringing the rate down would be helpful. At this time, she remains with a good blood pressure. The patient [place] [facility] [place] and [place]. Lastly, her urine does show infection.      Diagnosis:    ICD-10-CM    1. Sepsis, due to unspecified organism, unspecified whether acute organ dysfunction present (H)  A41.9    2. Urinary tract infection without hematuria, site unspecified  N39.0    3. Traumatic rhabdomyolysis, initial encounter (H)  T79.6XXA    4. Renal insufficiency  N28.9        Scribe Disclosure:  I, Judy Min, am serving as a scribe at 1:45 PM on 1/6/2022 to document services personally performed by Naya Goncalves MD based on my observations and the provider's statements to me.     Clayton Goncalves MD  01/06/22 1127

## 2023-04-21 ENCOUNTER — OFFICE VISIT (OUTPATIENT)
Dept: UROLOGY | Facility: CLINIC | Age: 58
End: 2023-04-21
Payer: COMMERCIAL

## 2023-04-21 VITALS — DIASTOLIC BLOOD PRESSURE: 90 MMHG | SYSTOLIC BLOOD PRESSURE: 128 MMHG | HEART RATE: 86 BPM

## 2023-04-21 DIAGNOSIS — R39.15 URINARY URGENCY: Primary | ICD-10-CM

## 2023-04-21 DIAGNOSIS — N39.41 URGE INCONTINENCE: ICD-10-CM

## 2023-04-21 PROCEDURE — 1159F PR MEDICATION LIST DOCUMENTED IN MEDICAL RECORD: ICD-10-PCS | Mod: CPTII,S$GLB,, | Performed by: UROLOGY

## 2023-04-21 PROCEDURE — 99999 PR PBB SHADOW E&M-EST. PATIENT-LVL III: CPT | Mod: PBBFAC,,, | Performed by: UROLOGY

## 2023-04-21 PROCEDURE — 1159F MED LIST DOCD IN RCRD: CPT | Mod: CPTII,S$GLB,, | Performed by: UROLOGY

## 2023-04-21 PROCEDURE — 3080F DIAST BP >= 90 MM HG: CPT | Mod: CPTII,S$GLB,, | Performed by: UROLOGY

## 2023-04-21 PROCEDURE — 99214 PR OFFICE/OUTPT VISIT, EST, LEVL IV, 30-39 MIN: ICD-10-PCS | Mod: S$GLB,,, | Performed by: UROLOGY

## 2023-04-21 PROCEDURE — 3074F PR MOST RECENT SYSTOLIC BLOOD PRESSURE < 130 MM HG: ICD-10-PCS | Mod: CPTII,S$GLB,, | Performed by: UROLOGY

## 2023-04-21 PROCEDURE — 3074F SYST BP LT 130 MM HG: CPT | Mod: CPTII,S$GLB,, | Performed by: UROLOGY

## 2023-04-21 PROCEDURE — 99999 PR PBB SHADOW E&M-EST. PATIENT-LVL III: ICD-10-PCS | Mod: PBBFAC,,, | Performed by: UROLOGY

## 2023-04-21 PROCEDURE — 3080F PR MOST RECENT DIASTOLIC BLOOD PRESSURE >= 90 MM HG: ICD-10-PCS | Mod: CPTII,S$GLB,, | Performed by: UROLOGY

## 2023-04-21 PROCEDURE — 99214 OFFICE O/P EST MOD 30 MIN: CPT | Mod: S$GLB,,, | Performed by: UROLOGY

## 2023-04-21 RX ORDER — HYOSCYAMINE SULFATE 0.125 MG
125 TABLET ORAL EVERY 6 HOURS PRN
Qty: 120 TABLET | Refills: 11 | Status: SHIPPED | OUTPATIENT
Start: 2023-04-21 | End: 2023-05-21

## 2023-04-21 NOTE — PROGRESS NOTES
CHIEF COMPLAINT:    Mrs. Kirkpatrick is a 57 y.o. female presenting for a follow up   PRESENTING ILLNESS:    Nirali Kirkpatrick is a 57 y.o. female who is presents with a history of MSA and mixed incontinence who is status post a transobturator sling for stress incontinence 7/13/2021.  The urge incontinence was treated with Detrol LA but she could not tolerate it secondary to dry mouth and constipation.  She was switched to Myrbetriq but does not think it works as well.  She has had nocturnal enuresis even with the medication on board.  She was treated for a bladder infection in March and had a second course of abx.  It cleared, she does not feel like she has an infection today.     She lives in [place], [place] and is accompanied by her .     REVIEW OF SYSTEMS:    Review of Systems   Constitutional: Negative.    HENT: Negative.     Eyes: Negative.    Respiratory: Negative.     Cardiovascular: Negative.    Gastrointestinal: Negative.    Genitourinary:  Positive for urgency.        Urge incontinence, nocturnal enuresis   Musculoskeletal: Negative.    Skin: Negative.    Neurological: Negative.    Endo/Heme/Allergies: Negative.    Psychiatric/Behavioral: Negative.       PATIENT HISTORY:    Past Medical History:   Diagnosis Date    Depression     Hypertension        Past Surgical History:   Procedure Laterality Date    CHOLECYSTECTOMY      CYSTOSCOPY N/A 6/15/2021    Procedure: CYSTOSCOPY;  Surgeon: Susan Mcgee MD;  Location: [address];  Service: Urology;  Laterality: N/A;    CYSTOSCOPY N/A 7/13/2021    Procedure: CYSTOSCOPY;  Surgeon: Susan Mcgee MD;  Location: [facility] OR [address] [place] [address];  Service: Urology;  Laterality: N/A;    FLUOROSCOPIC URODYNAMIC STUDY N/A 6/15/2021    Procedure: URODYNAMIC STUDY, FLUOROSCOPIC;  Surgeon: Susan Mcgee MD;  Location: [facility] OR [address] [place] [address];  Service: Urology;  Laterality: N/A;  90 MINUTES     Gastric sleeve  2013    INSERTION OF MIDURETHRAL SLING N/A 7/13/2021    Procedure:  SLING, MIDURETHRAL;  Surgeon: Susan Mcgee MD;  Location: [address];  Service: Urology;  Laterality: N/A;  1 HOUR     tubligation  2004       Family History   Problem Relation Age of Onset    Stroke Mother     Hypertension Mother     Heart disease Mother     Heart disease Father     Lung disease Father     Hyperlipidemia Father     Hypertension Father        Social History     Socioeconomic History    Marital status:    Tobacco Use    Smoking status: Never    Smokeless tobacco: Never   Substance and Sexual Activity    Alcohol use: Not Currently    Drug use: Never   Social History Narrative    ** Merged History Encounter **            Allergies:  Patient has no known allergies.    Medications:  Outpatient Encounter Medications as of 4/21/2023   Medication Sig Dispense Refill    b complex vitamins tablet Take 1 tablet by mouth once daily.      carbidopa-levodopa  mg (SINEMET)  mg per tablet Take 1 tablet by mouth 3 (three) times daily. 90 tablet 11    clonazePAM (KLONOPIN) 0.5 MG tablet Take 1 tablet by mouth twice daily as needed for anxiety 30 tablet 0    cyanocobalamin 1,000 mcg/mL injection 1,000 mcg every 30 days.       desvenlafaxine succinate (PRISTIQ) 100 MG Tb24 100 mg every evening.       midodrine (PROAMATINE) 2.5 MG Tab Take 3 tablets (7.5 mg total) by mouth 3 (three) times daily with meals. (Patient taking differently: Take 10 mg by mouth 3 (three) times daily with meals.) 270 tablet 11    mirabegron (MYRBETRIQ) 50 mg Tb24 Take 1 tablet (50 mg total) by mouth once daily. 30 tablet 11    multivitamin capsule Take 1 capsule by mouth once daily.      paroxetine (PAXIL) 20 MG tablet Take 20 mg by mouth every morning.      [DISCONTINUED] tolterodine (DETROL LA) 4 MG 24 hr capsule Take 1 capsule (4 mg total) by mouth once daily. 30 capsule 8     No facility-administered encounter medications on file as of 4/21/2023.         PHYSICAL EXAMINATION:    The patient generally appears in  good health, is appropriately interactive, and is in no apparent distress.    Skin: No lesions.    Mental: Cooperative with normal affect.    Neuro: Grossly intact.    HEENT: Normal. No evidence of lymphadenopathy.    Chest:  normal inspiratory effort.    Abdomen: Soft, non-tender. No masses or organomegaly. Bladder is not palpable. No evidence of flank discomfort. No evidence of inguinal hernia.    Extremities: No clubbing, cyanosis, or edema      LABS:    Lab Results   Component Value Date    BUN 20 07/14/2021    CREATININE 0.7 07/14/2021         IMPRESSION:    Urgency, urge incontinence.     PLAN:    1. Hyoscyamine, use GoodRx  2.  Discussed Vivally (home PTNS device) has been approved by the FDA but not available yet.  She is interested in trying it.  Discussed it will entail having them come in to the office for a fitting and then having the device shipped to them.      I spent 30 minutes with the patient of which more than half was spent in direct consultation with the patient in regards to our treatment and plan.

## 2023-05-01 ENCOUNTER — TELEPHONE (OUTPATIENT)
Dept: SLEEP MEDICINE | Facility: CLINIC | Age: 58
End: 2023-05-01
Payer: COMMERCIAL

## 2023-05-01 NOTE — TELEPHONE ENCOUNTER
Staff reached out to patient contact to inform them of their upcoming appointment tomorrow. Staff was communicated by patient that they will be present for their appointment.

## 2023-05-02 ENCOUNTER — OFFICE VISIT (OUTPATIENT)
Dept: SLEEP MEDICINE | Facility: CLINIC | Age: 58
End: 2023-05-02
Payer: COMMERCIAL

## 2023-05-02 DIAGNOSIS — J96.10 CHRONIC RESPIRATORY FAILURE, UNSPECIFIED WHETHER WITH HYPOXIA OR HYPERCAPNIA: Primary | ICD-10-CM

## 2023-05-02 DIAGNOSIS — G47.52 REM SLEEP BEHAVIOR DISORDER: ICD-10-CM

## 2023-05-02 DIAGNOSIS — G47.10 HYPERSOMNOLENCE: ICD-10-CM

## 2023-05-02 PROCEDURE — 99214 OFFICE O/P EST MOD 30 MIN: CPT | Mod: 95,,, | Performed by: INTERNAL MEDICINE

## 2023-05-02 PROCEDURE — 99214 PR OFFICE/OUTPT VISIT, EST, LEVL IV, 30-39 MIN: ICD-10-PCS | Mod: 95,,, | Performed by: INTERNAL MEDICINE

## 2023-05-02 NOTE — PROGRESS NOTES
The patient location is: [place]  The chief complaint leading to consultation is: REM sleep behavior disorder    Visit type: audiovisual    15 minutes of total time spent on the encounter, which includes face to face time and non-face to face time preparing to see the patient (eg, review of tests), Obtaining and/or reviewing separately obtained history, Documenting clinical information in the electronic or other health record, Independently interpreting results (not separately reported) and communicating results to the patient/family/caregiver, or Care coordination (not separately reported).     Each patient to whom he or she provides medical services by telemedicine is:  (1) informed of the relationship between the physician and patient and the respective role of any other health care provider with respect to management of the patient; and (2) notified that he or she may decline to receive medical services by telemedicine and may withdraw from such care at any time.    ESTABLISHED PATIENT VISIT (saw Dr. Camejo in the past)    Nirali Kirkpatrick  is a pleasant 57 y.o. female  with history of MSA, Hashimoto's, RBD,  Has been started on home NIV through [facility]    Here today for: follow-up.    Plan last visit :      Since last visit:   Developed wheezing/ stridor at night and some during day    Spirometry 3/24/23: supine: FEV1 56%, FVC 53%, ratio 0.82,   PFT 3/24/23: FEV1 71, FVC 67%, ratio 0.84, MIP 25 , MEP 43  Received home NIV  Stridor resolved  Reports nightly usage  Less sleepy during the day        PAP history   Problems    Mask FFM (feels too small)   Pressure ?   DME [facility] aidan [id number] [phone]   Machine age 2023   Download N/a       SLEEP SCHEDULE   Environment    Bed Time 8P   Sleep Latency 1-2 hrs   Arousals 2-3   Nocturia 2-3   Back to sleep    Wake time 6AM   Naps one   Work        Past Medical History:   Diagnosis Date    Depression     Hypertension      Patient Active Problem List   Diagnosis     Ataxia    Vitamin B12 deficiency    Hashimoto's thyroiditis    Hyperparathyroidism    Weakness    Gait abnormality    REM sleep behavior disorder    Risk for falls    Decreased strength    Bladder disorder    Multiple system atrophy    AALIYAH (stress urinary incontinence, female)    Dysphagia    Dysarthria    Orthostasis    Shortness of breath       Current Outpatient Medications:     b complex vitamins tablet, Take 1 tablet by mouth once daily., Disp: , Rfl:     clonazePAM (KLONOPIN) 0.5 MG tablet, Take 1 tablet by mouth twice daily as needed for anxiety, Disp: 30 tablet, Rfl: 0    cyanocobalamin 1,000 mcg/mL injection, 1,000 mcg every 30 days. , Disp: , Rfl:     desvenlafaxine succinate (PRISTIQ) 100 MG Tb24, 100 mg every evening. , Disp: , Rfl:     hyoscyamine (ANASPAZ,LEVSIN) 0.125 mg Tab, Take 1 tablet (125 mcg total) by mouth every 6 (six) hours as needed (urgency, urge incontinence)., Disp: 120 tablet, Rfl: 11    midodrine (PROAMATINE) 2.5 MG Tab, Take 3 tablets (7.5 mg total) by mouth 3 (three) times daily with meals. (Patient taking differently: Take 10 mg by mouth 3 (three) times daily with meals.), Disp: 270 tablet, Rfl: 11    mirabegron (MYRBETRIQ) 50 mg Tb24, Take 1 tablet (50 mg total) by mouth once daily., Disp: 30 tablet, Rfl: 11     There were no vitals filed for this visit.  Physical Exam:    GEN:   Well-appearing  Psych:  Appropriate affect, demonstrates insight  SKIN:  No rash on the face or bridge of the nose      LABS:  No results found for: HGB, CO2      RECORDS REVIEWED PREVIOUSLY:    N/a      PROBLEM DESCRIPTION/ Sx on Presentation Interval Hx STATUS   Chronic respiratory failure   2' to MSA, MIP <60 Started NIV Likely controlled   Sleepiness   + sleepiness when inactive   ESS 18/24 on intake    PAP: no longer needing naps No longer needing naps improved   Nocturia   x 2-3 per sleep period N/a N/a   REM-behavior disorder    RBD: clonzapeam 0.5    PAP: less dream enactment  Some sleep talking    No longer actiing out her dreams improved   AM headache Wakes with headache about 4 times per week   Still has frequent headaches stable   Other issues:     PLAN     -will try to get download from her home vent  -at some point consider titration (may have underlying sleep apnea and needs higher EPAP)  -using and benefitting from PAP therapy    RTC 6 months

## 2023-05-08 ENCOUNTER — TELEPHONE (OUTPATIENT)
Dept: NEUROLOGY | Facility: CLINIC | Age: 58
End: 2023-05-08
Payer: COMMERCIAL

## 2023-05-08 ENCOUNTER — OFFICE VISIT (OUTPATIENT)
Dept: NEUROLOGY | Facility: CLINIC | Age: 58
End: 2023-05-08
Payer: COMMERCIAL

## 2023-05-08 DIAGNOSIS — R27.0 ATAXIA: ICD-10-CM

## 2023-05-08 DIAGNOSIS — R06.02 SHORTNESS OF BREATH: ICD-10-CM

## 2023-05-08 DIAGNOSIS — I95.1 ORTHOSTASIS: ICD-10-CM

## 2023-05-08 PROCEDURE — 99214 PR OFFICE/OUTPT VISIT, EST, LEVL IV, 30-39 MIN: ICD-10-PCS | Mod: 95,,, | Performed by: PSYCHIATRY & NEUROLOGY

## 2023-05-08 PROCEDURE — 99214 OFFICE O/P EST MOD 30 MIN: CPT | Mod: 95,,, | Performed by: PSYCHIATRY & NEUROLOGY

## 2023-05-08 RX ORDER — MIDODRINE HYDROCHLORIDE 10 MG/1
10 TABLET ORAL
Qty: 90 TABLET | Refills: 11 | Status: SHIPPED | OUTPATIENT
Start: 2023-05-08 | End: 2024-05-07

## 2023-05-08 RX ORDER — FLUDROCORTISONE ACETATE 0.1 MG/1
100 TABLET ORAL DAILY
Qty: 30 TABLET | Refills: 0 | Status: SHIPPED | OUTPATIENT
Start: 2023-05-08 | End: 2023-06-05 | Stop reason: SDUPTHER

## 2023-05-08 NOTE — ASSESSMENT & PLAN NOTE
Moderate progressive ataxia, worsening.  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  Dysautonomia which also fits.    Suggested continue exercising for balance.  Suggetsed start hobbies for manual dexterity    Stopped Ldopa and does not miss this - it was causing low BP

## 2023-05-08 NOTE — ASSESSMENT & PLAN NOTE
Some dizziness on standing continues  Suggested try midodrine 10mg TID  Add fludrocortisone 0.1mg QAM

## 2023-05-08 NOTE — ASSESSMENT & PLAN NOTE
Ordering NIV for nocturnal and daytime use to decrease the risk of exacerbation.  Home bipap will be insufficient due to the severity of her illness as MSA patients are known to have sudden events

## 2023-05-08 NOTE — PROGRESS NOTES
The patient location is: home  The chief complaint leading to consultation is: MSA    Visit type: audiovisual    Face to Face time with patient: 20mins  20 minutes of total time spent on the encounter, which includes face to face time and non-face to face time preparing to see the patient (eg, review of tests), Obtaining and/or reviewing separately obtained history, Documenting clinical information in the electronic or other health record, Independently interpreting results (not separately reported) and communicating results to the patient/family/caregiver, or Care coordination (not separately reported).     Each patient to whom he or she provides medical services by telemedicine is:  (1) informed of the relationship between the physician and patient and the respective role of any other health care provider with respect to management of the patient; and (2) notified that he or she may decline to receive medical services by telemedicine and may withdraw from such care at any time.    Notes:     [facility] Followup  PCP/Referring Provider: No referring provider defined for this encounter.  Date of Service: 9/27/2023    Chief Complaint: Ataxia    Interval Hx    Since last visit,   Wearing her BiPAP due to night-time and daytime stridor  Stopped carbidopa/levodopa 25/100mg 1 tab PO TID due to low BP events    Taking midodrine TID 10mg  BP still dropping to 60's    Declined significantly in ADLs  Feels like her daughters have a poor understanding of her disabilities  Still Dizzy on standing    Still Stopped working due to falls  Walker inhome can walk 3-4 feet- and motorized scooter for distance    Dyarthria worse   Falls weekly at least  Doing PT self guided    No change to sweating  Does have REM sleep behavior    Struggling to write  Can no longer do stairs.  Still having trouble writing checks   strength decreased   Speech is progressively slurred    Feels like memory is poor at times - trouble  "recalling names  Mild dyphagia    Workup  MRI brain showed decrease in [place] contour suggesting of MSA  PAGE scans showed normal uptake  EMG was normal 2019  CT C/AB/PEL was neg for malignancy    MELVIN 65 NL  TPO POS  Notes her son was diagnosed with PANDAS and "has funny eye movements" - poor fine motor skills as a kid    Does have REM sleep behavior issues for 3 months    PD Review of Symptoms:  Anosmia: None  Dysarthria/Hypophonia: None  Dysphagia/Sialorrhea: None  Hallucinations: None  Depression: None  Cognitive slowing: None  Urinary changes: None  Constipation: at times  Orthostasis: none  Falls: as above  Micrographia: none  Sleep issues:  -SUSAN: at times snores  -RBD: Talks and punches in her sleep  -Sleep Quality: good, wakes every 3 hours"    "PriorHPI: Nirali Kirkpatrick is a L HANDED 57 y.o. female with a medical issues significant for HTN, Anxiety, Vit B12 deficiency, who presents with ataxia for 1.5 years. She first noted 1.5 years ago her R foot was dragging and every 3-4 steps she would catch that toe. She noticed her R foot would catch on curbs. Going down steps is especially hard - she mis-steps often. She feels like she has progressively been imbalanced. Her first fall was a year ago. She has fallen more often since then. She falls twice a month now. Falls typically sideways. She holds onto her  for stability but does not use an assist device. Tends to have more ataxia after she's tired. She occasionally has a had tremor in her hands when starting IVs. She does at times feel clumsy with her hands. No spinning vertigo. No lightheadedness. No visual issues.    No double vision, eye drooping, head drop, dysphagia.  She was told her B12 was low normal Aug 2018, and has had 4 shots since    No dysarthria.    She saw a general neurologist  B6 - NL  B12 - 333    Brain MRI read as normal  Spine and Tspine 2018 - no central disc issues  Has not had an EMG    No fam hx ataxia, MS  Uncle has M. " Gravis  Mother and brother have had tremors      Review of Systems:   Review of Systems   Constitutional:  Negative for fever.   HENT:  Negative for congestion.    Eyes:  Negative for double vision.   Respiratory:  Negative for cough and shortness of breath.    Cardiovascular:  Negative for chest pain and leg swelling.   Gastrointestinal:  Negative for nausea.   Genitourinary:  Negative for dysuria.   Musculoskeletal:  Positive for falls.   Skin:  Negative for rash.   Neurological:  Positive for tremors. Negative for speech change and headaches.   Psychiatric/Behavioral:  Negative for depression.          Current Medications:  Outpatient Encounter Medications as of 5/8/2023   Medication Sig Dispense Refill    b complex vitamins tablet Take 1 tablet by mouth once daily.      cyanocobalamin 1,000 mcg/mL injection 1,000 mcg every 30 days.       desvenlafaxine succinate (PRISTIQ) 100 MG Tb24 100 mg every evening.       hyoscyamine (ANASPAZ,LEVSIN) 0.125 mg Tab Take 1 tablet (125 mcg total) by mouth every 6 (six) hours as needed (urgency, urge incontinence). 120 tablet 11    midodrine (PROAMATINE) 10 MG tablet Take 1 tablet (10 mg total) by mouth 3 (three) times daily with meals. 90 tablet 11    [DISCONTINUED] carbidopa-levodopa  mg (SINEMET)  mg per tablet Take 1 tablet by mouth 3 (three) times daily. 90 tablet 11    [DISCONTINUED] clonazePAM (KLONOPIN) 0.5 MG tablet Take 1 tablet by mouth twice daily as needed for anxiety 30 tablet 0    [DISCONTINUED] fludrocortisone (FLORINEF) 0.1 mg Tab Take 1 tablet (100 mcg total) by mouth once daily. 30 tablet 0    [DISCONTINUED] midodrine (PROAMATINE) 2.5 MG Tab Take 3 tablets (7.5 mg total) by mouth 3 (three) times daily with meals. (Patient taking differently: Take 10 mg by mouth 3 (three) times daily with meals.) 270 tablet 11    [DISCONTINUED] mirabegron (MYRBETRIQ) 50 mg Tb24 Take 1 tablet (50 mg total) by mouth once daily. 30 tablet 11    [DISCONTINUED]  multivitamin capsule Take 1 capsule by mouth once daily.      [DISCONTINUED] paroxetine (PAXIL) 20 MG tablet Take 20 mg by mouth every morning.      [DISCONTINUED] tolterodine (DETROL LA) 4 MG 24 hr capsule Take 1 capsule (4 mg total) by mouth once daily. 30 capsule 8     No facility-administered encounter medications on file as of 5/8/2023.       Past Medical History:  HTN    Past Surgical History:  Gallbladder, gastric sleeve    Current Living Situation: home    Social:  Social History     Socioeconomic History    Marital status:    Tobacco Use    Smoking status: Never    Smokeless tobacco: Never   Substance and Sexual Activity    Alcohol use: Not Currently    Drug use: Never   Social History Narrative    ** Merged History Encounter **            Family History:  As above    PHYSICAL:  There were no vitals taken for this visit.    Physical Exam  Constitutional: Well-developed, well-nourished, appears stated age  Eyes: No scleral icterus  ENT: Moist oral mucosa  Cardiovascular: No lower extremity edema   Respiratory: No labored breathing   Skin: No rash   Hematologic: No bruising    Other: GI/ deferred   Mental status: Alert and oriented to person, place, time, and situation;   follows commands  Speech: severe dysarthria, no aphasia  Cranial nerves:            CN II: Pupils mid-position and equal, not tested light or accommodation  CN III, IV, VI: Extraocular movements full, no nystagmus visualized  CN V: Not tested   CN VII: Face strong and symmetric bilaterally   CN VIII: Hearing intact to voice and conversation   CN IX, X: Palate raises midline and symmetric   CN XI: Strong shoulder shrug B/L  CN XII: Tongue appears midline   Motor: Normal bulk by appearance, no drift   Sensory: Not tested    Gait: Not tested  Deep tendon reflexes: Not tested  Movement/Coordination                    No hypophonic speech.                     No facial masking.  Mild bradykinesia.  Mild L hand resting tremor  FNF R>L  "intention tremor  No stridor                  Bradykinesia  ? Finger taps Finger flicks PEDRO Heel taps   Left 0 0 0 0   Right 1+ 0 0 0       Tremor Exam   Arms extended Arms in wing position, fingers almost touching Re-emergent Arms extended wrists extended Intention Resting Kinetic   Left ? ? ?+ ? ?+ ? ?   Right ? ? ? ? ?+ ? ?   Head tremor: No-No Yes-Yes NE        "General Medical Examination:  General: Good hygiene, appropriate appearance.  HEENT: Normocephalic, atraumatic.   Neck: Supple.   Chest: Unlabored breathing.   CV: Symmetric pulses.   Ext: No clubbing, cyanosis, or edema.     Mental Status:  Mood/Affect: Appropriate/congruent.  Level of consciousness: Awake, alert.  Orientation: Oriented to person, place, time and situation.  Language: Mod Dysarthria    Cranial nerves:  I: Not tested  II: PERRL, VFF to counting  III, IV, VI: EOMI with conjugate gaze and no nystagmus on end gaze - no SWJs  V: Facial sensation intact and symmetric over the bilateral V1-V3  VII: Facial muscle activation intact and symmetric over the bilateral upper and lower face  VIII: Hearing intact in the b/l ears and symmetrical to finger rub  IX, X, XII: TUP midline - no atrophy or fasiculations  X: SCMs and shoulder shrug full strength b/l and symmetric  Trouble with PA PA TA TA  Trouble with GAs and Naomy      Motor:   Cannot squeeze and hold 's hands   When lifting her knees she cannot overcome her  pushing it down  No stridor    Hyperreflexic 3+ at pattelae    ? Finger taps Finger flicks PEDRO Heel taps   Left nl nl nl nl   Right nl nl Incoordinated nl   Neck tone: nl  ? Arm Leg   Left nl nl   Right nl nl         Coordination:   -Finger to nose: intention tremor mild bilat  Past-points 1 inch R hand >L hand  Disdiatochokinesias R hand moderate  Disdiatochokinesias L hand mild  -Heel to shin: mild issues R foot  R hand spiral +++  L hand spiral ++    No resting or postural tremor    Gait:  -Arises from chair without use " "of hands.  -Casual gait is: wide based, somewhat stiff and staggering, circumducts, waves moderately L to R when she walks - mod ataxic   -Stride length: nl  -Arm Swing: decreased R  -Turning: wide  -Tandem gait: cannot  No foot drop"      Laboratory Data:  Results for RADHA HAHN (MRN [id number]) as of 6/3/2019 09:52   Ref. Range 5/2/2019 11:23   Folate Latest Ref Range: 4.0 - 24.0 ng/mL 10.7   Vitamin B-12 Latest Ref Range: 180 - 914 ng/L 525   Sodium Latest Ref Range: 136 - 145 mmol/L 141   Potassium Latest Ref Range: 3.5 - 5.1 mmol/L 3.7   Chloride Latest Ref Range: 95 - 110 mmol/L 106   CO2 Latest Ref Range: 23 - 29 mmol/L 26   Anion Gap Latest Ref Range: 8 - 16 mmol/L 9   BUN, Bld Latest Ref Range: 6 - 20 mg/dL 22 (H)   Creatinine Latest Ref Range: 0.5 - 1.4 mg/dL 0.8   eGFR if non African American Latest Ref Range: >60 mL/min/1.73 m^2 >60.0   eGFR if African American Latest Ref Range: >60 mL/min/1.73 m^2 >60.0   Glucose Latest Ref Range: 70 - 110 mg/dL 92   Calcium Latest Ref Range: 8.7 - 10.5 mg/dL 9.8   Alkaline Phosphatase Latest Ref Range: 55 - 135 U/L 86   PROTEIN TOTAL Latest Ref Range: 6.0 - 8.4 g/dL 7.4   Albumin Latest Ref Range: 3.5 - 5.2 g/dL 4.2   BILIRUBIN TOTAL Latest Ref Range: 0.1 - 1.0 mg/dL 0.6   AST Latest Ref Range: 10 - 40 U/L 19   ALT Latest Ref Range: 10 - 44 U/L 16   Ammonia Latest Ref Range: 10 - 50 umol/L 26   Arsenic Latest Ref Range: 0 - 12 ng/mL <1   Thiamine Latest Ref Range: 38 - 122 ug/L 63   Vitamin E Latest Ref Range: 500 - 1800 ug/dL 1302   Glutamic Acid Decarb Ab Latest Ref Range: <=0.02 nmol/L 0.00   TSH Latest Ref Range: 0.400 - 4.000 uIU/mL 0.649   PTH Latest Ref Range: 9.0 - 77.0 pg/mL 103.0 (H)   Race Unknown Test Not Performed   AChR Binding Ab, Serum Latest Ref Range: <=0.02 nmol/L 0.00   AChR Ganglionic Neuronal Ab Latest Ref Range: <=0.02 nmol/L 0.00   CRMP-5 IgG Latest Ref Range: <1:240 titer Negative   Neuronal (V-G) K+ Channel Ab, Serum Latest Ref Range: " <=0.02 nmol/L 0.00   NMO Interpretive Comments Unknown SEE BELOW   N-Type Calcium Channel Ab Latest Ref Range: <=0.03 nmol/L 0.00   P/Q Type Calcium Channel Ab Latest Ref Range: <=0.02 nmol/L 0.00   PAVAL AGNA-1, Serum Latest Ref Range: <1:240 titer Negative   PAVAL TBOY-1, Serum Latest Ref Range: <1:240 titer Negative   PAVAL TOBY-2, Serum Latest Ref Range: <1:240 titer Negative   PAVAL TOBY-3, Serum Latest Ref Range: <1:240 titer Negative   PAVAL reflex test added Unknown None.   PAVAL,  Amphiphysin Ab, Serum Latest Ref Range: <1:240 titer Negative   PAVAL, PCA-1, Serum Latest Ref Range: <1:240 titer Negative   PAVAL, PCA-2, Serum Latest Ref Range: <1:240 titer Negative   PAVAL, PCA-Tr, Serum Latest Ref Range: <1:240 titer Negative   STRIATED MUSCLE AB Latest Ref Range: <1:120 titer Negative   Lead Latest Ref Range: 0.0 - 4.9 mcg/dL <1.0   Cadmium Latest Ref Range: 0.0 - 4.9 ng/mL 0.3   City Unknown Test Not Performed   County Unknown Test Not Performed   Guardian First Name Unknown Test Not Performed   Guardian Last Name Unknown Test Not Performed   Home Phone Unknown Test Not Performed   [place] Latest Ref Range: 0 - 9 ng/mL 2   State Unknown Test Not Performed   Street Address Unknown Test Not Performed   Venous/Capillary Unknown Test Not Performed   Zip Unknown Test Not Performed      Results for RADHA HAHN (MRN [id number]) as of 8/23/2019 10:10   Ref. Range 6/3/2019 10:27   Sed Rate Latest Ref Range: 0 - 36 mm/Hr 13   CRP Latest Ref Range: 0.0 - 8.2 mg/L 0.5   CPK Latest Ref Range: 20 - 180 U/L 55   CERULOPLASMIN Latest Ref Range: 15.0 - 45.0 mg/dL 33.0   Acetylchol Modul Ab Latest Units: % 0   AChR Binding Ab, Serum Latest Ref Range: <=0.02 nmol/L 0.00   MG Interpretive Comments Unknown SEE BELOW   MuSK Antibody Test Latest Ref Range: 0.00 - 0.02 nmol/L 0.00   STRIATED MUSCLE AB Latest Ref Range: <1:120 titer Negative   Aldolase Latest Ref Range: 1.2 - 7.6 U/L 3.5       Imaging:  Brain MRI w/o  cerebellar degeneration or lesion - however poor quality MRI  Repeat brain MRI  FINDINGS:  MRI of the brain demonstrates no infarction, mass, hemorrhage, extra-axial collection, or other acute finding.  A skull base lesions are seen.  Following contrast administration, there is no abnormal enhancing lesion identified.  A small incidental developmental venous anomaly is seen in the right medial posterior thalamus.  Cspine w/o without central impingement    EMG  Summary   Nerve conduction study of bilateral lower extremities revealed normal antidromic sensory peak latencies, action potentials, and conduction velocities.   Motor peak latencies, amplitudes, and conduction velocities were normal. F-waves were normal.   Concentric needle examination of selected muscles in bilateral lower extremities revealed no evidence of acute or chronic denervation.   Impression   This is a normal study.     Assessment//Plan:   Problem List Items Addressed This Visit          Neuro    Ataxia    Current Assessment & Plan     Moderate progressive ataxia, worsening.  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  Dysautonomia which also fits.    Suggested continue exercising for balance.  Suggetsed start hobbies for manual dexterity    Stopped Ldopa and does not miss this - it was causing low BP              Pulmonary    Shortness of breath    Current Assessment & Plan     Ordering NIV for nocturnal and daytime use to decrease the risk of exacerbation.  Home bipap will be insufficient due to the severity of her illness as MSA patients are known to have sudden events              Cardiac/Vascular    Orthostasis    Current Assessment & Plan     Some dizziness on standing continues  Suggested try midodrine 10mg TID  Add fludrocortisone 0.1mg SHAHIDA Polo MD, MS  [facility]  Department of Neurology  Movement Disorders

## 2023-05-19 ENCOUNTER — PATIENT MESSAGE (OUTPATIENT)
Dept: NEUROLOGY | Facility: CLINIC | Age: 58
End: 2023-05-19
Payer: COMMERCIAL

## 2023-05-22 ENCOUNTER — PATIENT MESSAGE (OUTPATIENT)
Dept: UROLOGY | Facility: CLINIC | Age: 58
End: 2023-05-22
Payer: COMMERCIAL

## 2023-06-04 ENCOUNTER — PATIENT MESSAGE (OUTPATIENT)
Dept: NEUROLOGY | Facility: CLINIC | Age: 58
End: 2023-06-04
Payer: COMMERCIAL

## 2023-06-05 ENCOUNTER — PATIENT MESSAGE (OUTPATIENT)
Dept: NEUROLOGY | Facility: CLINIC | Age: 58
End: 2023-06-05
Payer: COMMERCIAL

## 2023-06-05 ENCOUNTER — PATIENT MESSAGE (OUTPATIENT)
Dept: SLEEP MEDICINE | Facility: CLINIC | Age: 58
End: 2023-06-05
Payer: COMMERCIAL

## 2023-06-06 RX ORDER — FLUDROCORTISONE ACETATE 0.1 MG/1
100 TABLET ORAL DAILY
Qty: 30 TABLET | Refills: 0 | Status: SHIPPED | OUTPATIENT
Start: 2023-06-06 | End: 2023-06-29 | Stop reason: SDUPTHER

## 2023-06-28 ENCOUNTER — PATIENT MESSAGE (OUTPATIENT)
Dept: NEUROLOGY | Facility: CLINIC | Age: 58
End: 2023-06-28
Payer: COMMERCIAL

## 2023-06-28 DIAGNOSIS — I95.1 ORTHOSTASIS: Primary | ICD-10-CM

## 2023-06-29 RX ORDER — FLUDROCORTISONE ACETATE 0.1 MG/1
100 TABLET ORAL DAILY
Qty: 30 TABLET | Refills: 5 | Status: SHIPPED | OUTPATIENT
Start: 2023-06-29 | End: 2023-07-29

## 2023-07-02 ENCOUNTER — PATIENT MESSAGE (OUTPATIENT)
Dept: NEUROLOGY | Facility: CLINIC | Age: 58
End: 2023-07-02
Payer: COMMERCIAL

## 2023-07-02 DIAGNOSIS — I95.1 ORTHOSTASIS: ICD-10-CM

## 2023-07-06 RX ORDER — FLUDROCORTISONE ACETATE 0.1 MG/1
100 TABLET ORAL DAILY
Qty: 90 TABLET | Refills: 3 | Status: CANCELLED | OUTPATIENT
Start: 2023-07-06 | End: 2023-08-05

## 2023-08-02 RX ORDER — CLONAZEPAM 0.5 MG/1
0.5 TABLET ORAL 2 TIMES DAILY
Qty: 30 TABLET | Refills: 0 | Status: SHIPPED | OUTPATIENT
Start: 2023-08-02 | End: 2023-08-22

## 2023-08-03 ENCOUNTER — PATIENT MESSAGE (OUTPATIENT)
Dept: NEUROLOGY | Facility: CLINIC | Age: 58
End: 2023-08-03
Payer: COMMERCIAL

## 2023-08-03 ENCOUNTER — PATIENT MESSAGE (OUTPATIENT)
Dept: UROLOGY | Facility: CLINIC | Age: 58
End: 2023-08-03
Payer: COMMERCIAL

## 2023-08-22 RX ORDER — CLONAZEPAM 0.5 MG/1
TABLET ORAL
Qty: 30 TABLET | Refills: 0 | Status: SHIPPED | OUTPATIENT
Start: 2023-08-22 | End: 2023-11-28

## 2023-09-06 ENCOUNTER — PATIENT MESSAGE (OUTPATIENT)
Dept: UROLOGY | Facility: CLINIC | Age: 58
End: 2023-09-06
Payer: MEDICARE

## 2023-10-19 ENCOUNTER — OFFICE VISIT (OUTPATIENT)
Dept: NEUROLOGY | Facility: CLINIC | Age: 58
End: 2023-10-19
Payer: MEDICARE

## 2023-10-19 ENCOUNTER — PATIENT MESSAGE (OUTPATIENT)
Dept: NEUROLOGY | Facility: CLINIC | Age: 58
End: 2023-10-19

## 2023-10-19 DIAGNOSIS — R06.02 SHORTNESS OF BREATH: ICD-10-CM

## 2023-10-19 DIAGNOSIS — G90.3 MULTIPLE SYSTEM ATROPHY: ICD-10-CM

## 2023-10-19 DIAGNOSIS — I95.1 ORTHOSTASIS: ICD-10-CM

## 2023-10-19 DIAGNOSIS — G23.8 MULTIPLE SYSTEM ATROPHY: ICD-10-CM

## 2023-10-19 PROCEDURE — 99215 OFFICE O/P EST HI 40 MIN: CPT | Mod: S$PBB,,, | Performed by: PSYCHIATRY & NEUROLOGY

## 2023-10-19 PROCEDURE — 99215 PR OFFICE/OUTPT VISIT, EST, LEVL V, 40-54 MIN: ICD-10-PCS | Mod: S$PBB,,, | Performed by: PSYCHIATRY & NEUROLOGY

## 2023-10-19 RX ORDER — FLUDROCORTISONE ACETATE 0.1 MG/1
200 TABLET ORAL DAILY
Qty: 60 TABLET | Refills: 0 | Status: SHIPPED | OUTPATIENT
Start: 2023-10-19 | End: 2023-11-23 | Stop reason: SDUPTHER

## 2023-10-19 RX ORDER — ZONISAMIDE 25 MG/1
25 CAPSULE ORAL 2 TIMES DAILY
Qty: 60 CAPSULE | Refills: 11 | Status: SHIPPED | OUTPATIENT
Start: 2023-10-19 | End: 2024-10-18

## 2023-10-19 NOTE — PROGRESS NOTES
"MOVEMENT DISORDERS CLINIC Followup  PCP/Referring Provider: No referring provider defined for this encounter.  Date of Service: 11/1/2023    Chief Complaint: Ataxia    Interval Hx    Since last visit,   Enrolling in [place] MSA trial  Wearing her BiPAP due to night-time and daytime stridor  Stopped carbidopa/levodopa 25/100mg 1 tab PO TID due to low BP events    Taking midodrine TID 10mg  Fludrocortisone 0.1mcg daily  BP still dropping to 60's and at time pre syncopal  Systolics not sustained over 160's    Declined significantly in ADLs  Feels like her daughters have a poor understanding of her disabilities      Still Stopped working due to falls  Walker in home - can walk 30 feet with the walker- and motorized scooter for distance    Dyarthria worse   Falls weekly at least  Doing PT self guided    No change to sweating  Does have REM sleep behavior    Struggling to write  Can no longer do stairs.  Still having trouble writing checks   strength decreased   Speech is progressively slurred    Feels like memory is poor at times - trouble recalling names  Mild dyphagia    Workup  MRI brain showed decrease in [place] contour suggesting of MSA  PAGE scans showed normal uptake  EMG was normal 2019  CT C/AB/PEL was neg for malignancy    MELVIN 65 NL  TPO POS  Notes her son was diagnosed with PANDAS and "has funny eye movements" - poor fine motor skills as a kid    Does have REM sleep behavior issues for 3 months    PD Review of Symptoms:  Anosmia: None  Dysarthria/Hypophonia: None  Dysphagia/Sialorrhea: None  Hallucinations: None  Depression: None  Cognitive slowing: None  Urinary changes: None  Constipation: at times  Orthostasis: none  Falls: as above  Micrographia: none  Sleep issues:  -SUSAN: at times snores  -RBD: Talks and punches in her sleep  -Sleep Quality: good, wakes every 3 hours"    "PriorHPI: Nirali Kirkpatrick is a L HANDED 58 y.o. female with a medical issues significant for HTN, Anxiety, Vit B12 deficiency, " who presents with ataxia for 1.5 years. She first noted 1.5 years ago her R foot was dragging and every 3-4 steps she would catch that toe. She noticed her R foot would catch on curbs. Going down steps is especially hard - she mis-steps often. She feels like she has progressively been imbalanced. Her first fall was a year ago. She has fallen more often since then. She falls twice a month now. Falls typically sideways. She holds onto her  for stability but does not use an assist device. Tends to have more ataxia after she's tired. She occasionally has a had tremor in her hands when starting IVs. She does at times feel clumsy with her hands. No spinning vertigo. No lightheadedness. No visual issues.    No double vision, eye drooping, head drop, dysphagia.  She was told her B12 was low normal Aug 2018, and has had 4 shots since    No dysarthria.    She saw a general neurologist  B6 - NL  B12 - 333    Brain MRI read as normal  Spine and Tspine 2018 - no central disc issues  Has not had an EMG    No fam hx ataxia, MS  Uncle has M. Gravis  Mother and brother have had tremors      Review of Systems:   Review of Systems   Constitutional:  Negative for fever.   HENT:  Negative for congestion.    Eyes:  Negative for double vision.   Respiratory:  Negative for cough and shortness of breath.    Cardiovascular:  Negative for chest pain and leg swelling.   Gastrointestinal:  Negative for nausea.   Genitourinary:  Negative for dysuria.   Musculoskeletal:  Positive for falls.   Skin:  Negative for rash.   Neurological:  Positive for tremors. Negative for speech change and headaches.   Psychiatric/Behavioral:  Negative for depression.          Current Medications:  Outpatient Encounter Medications as of 10/19/2023   Medication Sig Dispense Refill    b complex vitamins tablet Take 1 tablet by mouth once daily.      clonazePAM (KLONOPIN) 0.5 MG tablet TAKE 1 TABLET TWICE A DAY  AS NEEDED FOR ANXIETY 30 tablet 0     cyanocobalamin 1,000 mcg/mL injection 1,000 mcg every 30 days.       desvenlafaxine succinate (PRISTIQ) 100 MG Tb24 100 mg every evening.       fludrocortisone (FLORINEF) 0.1 mg Tab Take 2 tablets (200 mcg total) by mouth once daily. 60 tablet 0    hyoscyamine (ANASPAZ,LEVSIN) 0.125 mg Tab Take 1 tablet (125 mcg total) by mouth every 6 (six) hours as needed (urgency, urge incontinence). 120 tablet 11    midodrine (PROAMATINE) 10 MG tablet Take 1 tablet (10 mg total) by mouth 3 (three) times daily with meals. 90 tablet 11    zonisamide (ZONEGRAN) 25 MG Cap Take 1 capsule (25 mg total) by mouth 2 (two) times daily. 60 capsule 11     No facility-administered encounter medications on file as of 10/19/2023.       Past Medical History:  HTN    Past Surgical History:  Gallbladder, gastric sleeve    Current Living Situation: home    Social:  Social History     Socioeconomic History    Marital status:    Tobacco Use    Smoking status: Never    Smokeless tobacco: Never   Substance and Sexual Activity    Alcohol use: Not Currently    Drug use: Never   Social History Narrative    ** Merged History Encounter **            Family History:  As above    PHYSICAL:  There were no vitals taken for this visit.    Physical Exam  Constitutional: Well-developed, well-nourished, appears stated age  Eyes: No scleral icterus  ENT: Moist oral mucosa  Cardiovascular: No lower extremity edema   Respiratory: No labored breathing   Skin: No rash   Hematologic: No bruising    Other: GI/ deferred   Mental status: Alert and oriented to person, place, time, and situation;   follows commands  Speech: severe dysarthria, no aphasia  Cranial nerves:            CN II: Pupils mid-position and equal, not tested light or accommodation  CN III, IV, VI: Extraocular movements full, no nystagmus visualized  CN V: Not tested   CN VII: Face strong and symmetric bilaterally   CN VIII: Hearing intact to voice and conversation   CN IX, X: Palate raises  "midline and symmetric   CN XI: Strong shoulder shrug B/L  CN XII: Tongue appears midline   Motor: Normal bulk by appearance, no drift   Sensory: Not tested    Gait: Not tested  Deep tendon reflexes: Not tested  Movement/Coordination                    No hypophonic speech.                     No facial masking.  Mild bradykinesia.  Mild L hand resting tremor  FNF R>L intention tremor  No stridor                  Bradykinesia  ? Finger taps Finger flicks PEDRO Heel taps   Left 0 0 0 0   Right 1+ 0 0 0       Tremor Exam   Arms extended Arms in wing position, fingers almost touching Re-emergent Arms extended wrists extended Intention Resting Kinetic   Left ?++ ? ?+ ? ?++ ? ?   Right ?+ ? ? ? ?+ ? ?           "General Medical Examination:  General: Good hygiene, appropriate appearance.  HEENT: Normocephalic, atraumatic.   Neck: Supple.   Chest: Unlabored breathing.   CV: Symmetric pulses.   Ext: No clubbing, cyanosis, or edema.     Mental Status:  Mood/Affect: Appropriate/congruent.  Level of consciousness: Awake, alert.  Orientation: Oriented to person, place, time and situation.  Language: Mod Dysarthria    Cranial nerves:  I: Not tested  II: PERRL, VFF to counting  III, IV, VI: EOMI with conjugate gaze and no nystagmus on end gaze - no SWJs  V: Facial sensation intact and symmetric over the bilateral V1-V3  VII: Facial muscle activation intact and symmetric over the bilateral upper and lower face  VIII: Hearing intact in the b/l ears and symmetrical to finger rub  IX, X, XII: TUP midline - no atrophy or fasiculations  X: SCMs and shoulder shrug full strength b/l and symmetric  Trouble with PA PA TA TA  Trouble with GAs and Naoym      Motor:   Cannot squeeze and hold 's hands   When lifting her knees she cannot overcome her  pushing it down  No stridor    Hyperreflexic 3+ at pattelae    ? Finger taps Finger flicks PEDRO Heel taps   Left nl nl nl nl   Right nl nl Incoordinated nl   Neck tone: nl  ? Arm Leg " "  Left nl nl   Right nl nl         Coordination:   -Finger to nose: intention tremor mild bilat  Past-points 1 inch R hand >L hand  Disdiatochokinesias R hand moderate  Disdiatochokinesias L hand mild  -Heel to shin: mild issues R foot  R hand spiral +++  L hand spiral ++    No resting or postural tremor    Gait:  -Arises from chair without use of hands.  -Casual gait is: wide based, somewhat stiff and staggering, circumducts, waves moderately L to R when she walks - mod ataxic   -Stride length: nl  -Arm Swing: decreased R  -Turning: wide  -Tandem gait: cannot  No foot drop"      Laboratory Data:  Results for RADHA HAHN (MRN [id number]) as of 6/3/2019 09:52   Ref. Range 5/2/2019 11:23   Folate Latest Ref Range: 4.0 - 24.0 ng/mL 10.7   Vitamin B-12 Latest Ref Range: 180 - 914 ng/L 525   Sodium Latest Ref Range: 136 - 145 mmol/L 141   Potassium Latest Ref Range: 3.5 - 5.1 mmol/L 3.7   Chloride Latest Ref Range: 95 - 110 mmol/L 106   CO2 Latest Ref Range: 23 - 29 mmol/L 26   Anion Gap Latest Ref Range: 8 - 16 mmol/L 9   BUN, Bld Latest Ref Range: 6 - 20 mg/dL 22 (H)   Creatinine Latest Ref Range: 0.5 - 1.4 mg/dL 0.8   eGFR if non African American Latest Ref Range: >60 mL/min/1.73 m^2 >60.0   eGFR if African American Latest Ref Range: >60 mL/min/1.73 m^2 >60.0   Glucose Latest Ref Range: 70 - 110 mg/dL 92   Calcium Latest Ref Range: 8.7 - 10.5 mg/dL 9.8   Alkaline Phosphatase Latest Ref Range: 55 - 135 U/L 86   PROTEIN TOTAL Latest Ref Range: 6.0 - 8.4 g/dL 7.4   Albumin Latest Ref Range: 3.5 - 5.2 g/dL 4.2   BILIRUBIN TOTAL Latest Ref Range: 0.1 - 1.0 mg/dL 0.6   AST Latest Ref Range: 10 - 40 U/L 19   ALT Latest Ref Range: 10 - 44 U/L 16   Ammonia Latest Ref Range: 10 - 50 umol/L 26   Arsenic Latest Ref Range: 0 - 12 ng/mL <1   Thiamine Latest Ref Range: 38 - 122 ug/L 63   Vitamin E Latest Ref Range: 500 - 1800 ug/dL 1302   Glutamic Acid Decarb Ab Latest Ref Range: <=0.02 nmol/L 0.00   TSH Latest Ref Range: 0.400 " - 4.000 uIU/mL 0.649   PTH Latest Ref Range: 9.0 - 77.0 pg/mL 103.0 (H)   Race Unknown Test Not Performed   AChR Binding Ab, Serum Latest Ref Range: <=0.02 nmol/L 0.00   AChR Ganglionic Neuronal Ab Latest Ref Range: <=0.02 nmol/L 0.00   CRMP-5 IgG Latest Ref Range: <1:240 titer Negative   Neuronal (V-G) K+ Channel Ab, Serum Latest Ref Range: <=0.02 nmol/L 0.00   NMO Interpretive Comments Unknown SEE BELOW   N-Type Calcium Channel Ab Latest Ref Range: <=0.03 nmol/L 0.00   P/Q Type Calcium Channel Ab Latest Ref Range: <=0.02 nmol/L 0.00   PAVAL AGNA-1, Serum Latest Ref Range: <1:240 titer Negative   PAVAL TOBY-1, Serum Latest Ref Range: <1:240 titer Negative   PAVAL TOBY-2, Serum Latest Ref Range: <1:240 titer Negative   PAVAL TOBY-3, Serum Latest Ref Range: <1:240 titer Negative   PAVAL reflex test added Unknown None.   PAVAL,  Amphiphysin Ab, Serum Latest Ref Range: <1:240 titer Negative   PAVAL, PCA-1, Serum Latest Ref Range: <1:240 titer Negative   PAVAL, PCA-2, Serum Latest Ref Range: <1:240 titer Negative   PAVAL, PCA-Tr, Serum Latest Ref Range: <1:240 titer Negative   STRIATED MUSCLE AB Latest Ref Range: <1:120 titer Negative   Lead Latest Ref Range: 0.0 - 4.9 mcg/dL <1.0   Cadmium Latest Ref Range: 0.0 - 4.9 ng/mL 0.3   City Unknown Test Not Performed   County Unknown Test Not Performed   Guardian First Name Unknown Test Not Performed   Guardian Last Name Unknown Test Not Performed   Home Phone Unknown Test Not Performed   [place] Latest Ref Range: 0 - 9 ng/mL 2   State Unknown Test Not Performed   Street Address Unknown Test Not Performed   Venous/Capillary Unknown Test Not Performed   Zip Unknown Test Not Performed      Results for RADHA HAHN (MRN [id number]) as of 8/23/2019 10:10   Ref. Range 6/3/2019 10:27   Sed Rate Latest Ref Range: 0 - 36 mm/Hr 13   CRP Latest Ref Range: 0.0 - 8.2 mg/L 0.5   CPK Latest Ref Range: 20 - 180 U/L 55   CERULOPLASMIN Latest Ref Range: 15.0 - 45.0 mg/dL 33.0   Acetylchol  Modul Ab Latest Units: % 0   AChR Binding Ab, Serum Latest Ref Range: <=0.02 nmol/L 0.00   MG Interpretive Comments Unknown SEE BELOW   MuSK Antibody Test Latest Ref Range: 0.00 - 0.02 nmol/L 0.00   STRIATED MUSCLE AB Latest Ref Range: <1:120 titer Negative   Aldolase Latest Ref Range: 1.2 - 7.6 U/L 3.5       Imaging:  Brain MRI w/o cerebellar degeneration or lesion - however poor quality MRI  Repeat brain MRI  FINDINGS:  MRI of the brain demonstrates no infarction, mass, hemorrhage, extra-axial collection, or other acute finding.  A skull base lesions are seen.  Following contrast administration, there is no abnormal enhancing lesion identified.  A small incidental developmental venous anomaly is seen in the right medial posterior thalamus.  Cspine w/o without central impingement    EMG  Summary   Nerve conduction study of bilateral lower extremities revealed normal antidromic sensory peak latencies, action potentials, and conduction velocities.   Motor peak latencies, amplitudes, and conduction velocities were normal. F-waves were normal.   Concentric needle examination of selected muscles in bilateral lower extremities revealed no evidence of acute or chronic denervation.   Impression   This is a normal study.     Assessment//Plan:   Problem List Items Addressed This Visit          Neuro    Multiple system atrophy    Current Assessment & Plan     Moderate progressive ataxia, worsening.  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  Dysautonomia which also fits.    Suggested continue exercising for balance.  Suggetsed start hobbies for manual dexterity    Stopped Ldopa and does not miss this - it was causing low BP  Try zonisamine 25mg BID - we talk about stopping if she develop a rash  Enroll in [place]/ [place] trials    Patient requires a lightweight wheelchair for severe mobility limitation in all of her MRADLS.   The mobility limitation cannot be  sufficiently resolved by the use of a cane or walker or standard wheelchair. The use of a lightweight wheelchair will significantly improve the patient's ability to participate in MRADLS and the patient will use it on regular basis in the home.   She has a caregiver who is available, willing and able to provide assistance with the wheelchair when needed.               Pulmonary    Shortness of breath    Current Assessment & Plan     Continue BIPAP for avoid dangerous stridor            Cardiac/Vascular    Orthostasis    Current Assessment & Plan     Some dizziness on standing continues  Suggested try midodrine 10mg TID  Add fludrocortisone 0.2mg QAM          I spent 60 minutes discussing MSA, dysautonomia, clinical trials    Ondina Polo MD, MS [facility]  Department of Neurology  Movement Disorders

## 2023-10-19 NOTE — ASSESSMENT & PLAN NOTE
Moderate progressive ataxia, worsening.  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  Dysautonomia which also fits.    Suggested continue exercising for balance.  Suggetsed start hobbies for manual dexterity    Stopped Ldopa and does not miss this - it was causing low BP  Try zonisamine 25mg BID - we talk about stopping if she develop a rash  Enroll in [place]/ [place] trials    Patient requires a lightweight wheelchair for severe mobility limitation in all of her MRADLS.   The mobility limitation cannot be sufficiently resolved by the use of a cane or walker or standard wheelchair. The use of a lightweight wheelchair will significantly improve the patient's ability to participate in MRADLS and the patient will use it on regular basis in the home.   She has a caregiver who is available, willing and able to provide assistance with the wheelchair when needed.

## 2023-10-19 NOTE — ASSESSMENT & PLAN NOTE
Some dizziness on standing continues  Suggested try midodrine 10mg TID  Add fludrocortisone 0.2mg QAM

## 2023-10-20 ENCOUNTER — TELEPHONE (OUTPATIENT)
Dept: NEUROLOGY | Facility: CLINIC | Age: 58
End: 2023-10-20
Payer: MEDICARE

## 2023-10-20 DIAGNOSIS — R27.0 ATAXIA: ICD-10-CM

## 2023-10-20 DIAGNOSIS — G23.8 MULTIPLE SYSTEM ATROPHY: Primary | ICD-10-CM

## 2023-10-20 DIAGNOSIS — G90.3 MULTIPLE SYSTEM ATROPHY: Primary | ICD-10-CM

## 2023-10-20 NOTE — TELEPHONE ENCOUNTER
Left voicemail for patient to clarify which w/c she would like to have ordered.     [facility] Provided patient with their contact info.

## 2023-10-20 NOTE — TELEPHONE ENCOUNTER
----- Message from Ondina Polo MD sent at 10/19/2023  2:47 PM CDT -----  Can we send a wheelchair order or do we need PT to order?

## 2023-11-01 ENCOUNTER — TELEPHONE (OUTPATIENT)
Dept: NEUROLOGY | Facility: CLINIC | Age: 58
End: 2023-11-01
Payer: MEDICARE

## 2023-11-01 NOTE — TELEPHONE ENCOUNTER
----- Message from Ondina Polo MD sent at 10/30/2023  8:20 PM CDT -----  Regarding: FW: Wheelchair  Please pend  ----- Message -----  From: Kathleen Britton  Sent: 10/30/2023   4:27 PM CDT  To: Ondina Polo MD  Subject: Wheelchair                                       Good Afternoon,    Pt called into the office regarding her order for a wheelchair. The order in epic is good the only thing that is missing for Medicare to cover is justification within office notes. Medicare is looking for this wording for approval:     ___________ has a mobility limitation that significantly impairs her ability to participate in one or more mobility related activities of daily living (MRADL's) such as toileting, feeding, dressing, grooming and bathing in customary locations in the home.  The mobility limitation cannot be sufficiently  resolved by the use of a cane or walker.   The use of a manual wheelchair will significantly improve the patient's ability to participate in MRADLS and the patient will use it on regular basis in the home.  ___________ has expressed her willingness to use a manual wheelchair in the home.   She also has a caregiver who is available, willing and able to provide assistance with the wheelchair when needed.      ##If a Lightweight WC is ordered, the MD must rule out a Standard WC in the notes.##    Thank you in advance.

## 2023-11-02 ENCOUNTER — PATIENT MESSAGE (OUTPATIENT)
Dept: UROLOGY | Facility: CLINIC | Age: 58
End: 2023-11-02
Payer: MEDICARE

## 2023-11-14 ENCOUNTER — OFFICE VISIT (OUTPATIENT)
Dept: SLEEP MEDICINE | Facility: CLINIC | Age: 58
End: 2023-11-14
Payer: MEDICARE

## 2023-11-14 DIAGNOSIS — G23.8 MULTIPLE SYSTEM ATROPHY: ICD-10-CM

## 2023-11-14 DIAGNOSIS — J96.12 CHRONIC RESPIRATORY FAILURE WITH HYPERCAPNIA: Primary | ICD-10-CM

## 2023-11-14 DIAGNOSIS — G90.3 MULTIPLE SYSTEM ATROPHY: ICD-10-CM

## 2023-11-14 DIAGNOSIS — G47.10 HYPERSOMNOLENCE: ICD-10-CM

## 2023-11-14 PROCEDURE — 99214 OFFICE O/P EST MOD 30 MIN: CPT | Mod: 95,,, | Performed by: INTERNAL MEDICINE

## 2023-11-14 PROCEDURE — 99214 PR OFFICE/OUTPT VISIT, EST, LEVL IV, 30-39 MIN: ICD-10-PCS | Mod: 95,,, | Performed by: INTERNAL MEDICINE

## 2023-11-14 NOTE — PROGRESS NOTES
The patient location is: [place]  The chief complaint leading to consultation is: chronic respiratory failure    Visit type: audiovisual    15 minutes of total time spent on the encounter, which includes face to face time and non-face to face time preparing to see the patient (eg, review of tests), Obtaining and/or reviewing separately obtained history, Documenting clinical information in the electronic or other health record, Independently interpreting results (not separately reported) and communicating results to the patient/family/caregiver, or Care coordination (not separately reported).     Each patient to whom he or she provides medical services by telemedicine is:  (1) informed of the relationship between the physician and patient and the respective role of any other health care provider with respect to management of the patient; and (2) notified that he or she may decline to receive medical services by telemedicine and may withdraw from such care at any time.      ESTABLISHED PATIENT VISIT     Nirali Kirkpatrick  is a pleasant 58 y.o. female  with history of MSA, Hashimoto's, RBD,  neuromuscular respiratory failure 2' to MSA, Has been started on home NIV through [facility]    Here today for: follow-up.      Since last visit:     Using trilogy nightly x about 5 hours, getting tangled up in the hose  Breathing well with it      PAP history   Problems    Mask FFM   Pressure tolerating   DME [facility] aidan cell [phone]   Machine age 2023   Download See below       SLEEP SCHEDULE   Environment    Bed Time 8P   Sleep Latency 1-2 hrs   Arousals 2-3   Nocturia 2-3   Back to sleep    Wake time 6AM   Naps one   Work        Past Medical History:   Diagnosis Date    Depression     Hypertension      Patient Active Problem List   Diagnosis    Ataxia    Vitamin B12 deficiency    Hashimoto's thyroiditis    Hyperparathyroidism    Weakness    Gait abnormality    REM sleep behavior disorder    Risk for falls    Decreased  "strength    Bladder disorder    Multiple system atrophy    AALIYAH (stress urinary incontinence, female)    Dysphagia    Dysarthria    Orthostasis    Shortness of breath       Current Outpatient Medications:     b complex vitamins tablet, Take 1 tablet by mouth once daily., Disp: , Rfl:     clonazePAM (KLONOPIN) 0.5 MG tablet, TAKE 1 TABLET TWICE A DAY  AS NEEDED FOR ANXIETY, Disp: 30 tablet, Rfl: 0    cyanocobalamin 1,000 mcg/mL injection, 1,000 mcg every 30 days. , Disp: , Rfl:     desvenlafaxine succinate (PRISTIQ) 100 MG Tb24, 100 mg every evening. , Disp: , Rfl:     fludrocortisone (FLORINEF) 0.1 mg Tab, Take 2 tablets (200 mcg total) by mouth once daily., Disp: 60 tablet, Rfl: 0    hyoscyamine (ANASPAZ,LEVSIN) 0.125 mg Tab, Take 1 tablet (125 mcg total) by mouth every 6 (six) hours as needed (urgency, urge incontinence)., Disp: 120 tablet, Rfl: 11    midodrine (PROAMATINE) 10 MG tablet, Take 1 tablet (10 mg total) by mouth 3 (three) times daily with meals., Disp: 90 tablet, Rfl: 11    zonisamide (ZONEGRAN) 25 MG Cap, Take 1 capsule (25 mg total) by mouth 2 (two) times daily., Disp: 60 capsule, Rfl: 11     There were no vitals filed for this visit.  Physical Exam:    GEN:   Well-appearing  Psych:  Appropriate affect, demonstrates insight  SKIN:  No rash on the face or bridge of the nose      LABS:  No results found for: "HGB", "CO2"      RECORDS REVIEWED PREVIOUSLY:    Spirometry 3/24/23: supine: FEV1 56%, FVC 53%, ratio 0.82,   PFT 3/24/23: FEV1 71, FVC 67%, ratio 0.84, MIP 25 , MEP 43    Download   8.7.23: 85/90 x 4h 16min, IPAP (10/13.5/21.2), EPAP (5/7.5/15), RR 10/12/24), Triger 61%, Leak 10/19/73, VT (avg 622)  Settings VAPS AE, Vt 400ml, Pinsp 5-15, Epap 5-15, rate auto, sensitivity 0%      PROBLEM DESCRIPTION/ Sx on Presentation Interval Hx STATUS   Chronic respiratory failure   2' to MSA, MIP <60 Reports doing well with Trilogy using stable   Stridor 2' MSA     Occasional episodes  Feels better with usage " of the trilogy stable   Sleepiness   + sleepiness when inactive   ESS 18/24 on intake    PAP: no longer needing naps Still needing naps persists   Nocturia   x 2-3 per sleep period N/a N/a   REM-behavior disorder  RBD: clonzapeam 0.5    PAP: less dream enactment  Some sleep talking   Occasional dream enactment, no injuries stable   AM headache Wakes with headache about 4 times per week   Still about 4-5 days per week persists   Other issues:     PLAN     -continue clonazepam 0.5mg for RBD  -Try using trilogy during the day to see if headaches are improved with usage  -encouraged increase usage at night (try elevated hose to avoid getting tangled)      Using and benefitting from PAP therapy    RTC 6 months

## 2023-11-27 RX ORDER — FLUDROCORTISONE ACETATE 0.1 MG/1
200 TABLET ORAL DAILY
Qty: 60 TABLET | Refills: 0 | Status: SHIPPED | OUTPATIENT
Start: 2023-11-27 | End: 2023-12-26 | Stop reason: SDUPTHER

## 2023-11-30 ENCOUNTER — PATIENT MESSAGE (OUTPATIENT)
Dept: NEUROLOGY | Facility: CLINIC | Age: 58
End: 2023-11-30
Payer: MEDICARE

## 2023-11-30 NOTE — TELEPHONE ENCOUNTER
Checked with Kathleen at [facility] on wc status.   Medicare denied the lightweight wheelchair due to her ability to walk a certain distance with a walker. And because she uses a motorized scooter for distance.  Medicare disqualifies wheelchairs now for patients that are able to walk.

## 2023-12-15 ENCOUNTER — TELEPHONE (OUTPATIENT)
Dept: NEUROLOGY | Facility: CLINIC | Age: 58
End: 2023-12-15
Payer: MEDICARE

## 2023-12-18 ENCOUNTER — OFFICE VISIT (OUTPATIENT)
Dept: NEUROLOGY | Facility: CLINIC | Age: 58
End: 2023-12-18
Payer: MEDICARE

## 2023-12-18 DIAGNOSIS — R27.0 ATAXIA: Primary | ICD-10-CM

## 2023-12-18 DIAGNOSIS — I95.1 ORTHOSTASIS: ICD-10-CM

## 2023-12-18 PROCEDURE — 99215 OFFICE O/P EST HI 40 MIN: CPT | Mod: 95,,, | Performed by: PSYCHIATRY & NEUROLOGY

## 2023-12-18 PROCEDURE — 99215 PR OFFICE/OUTPT VISIT, EST, LEVL V, 40-54 MIN: ICD-10-PCS | Mod: 95,,, | Performed by: PSYCHIATRY & NEUROLOGY

## 2023-12-18 NOTE — PROGRESS NOTES
The patient location is: HOME  The chief complaint leading to visit is: MSA  1. Ataxia        2. Orthostasis          Visit type: Virtual visit with synchronous audio and video  Total time spent with patient: 20  Each patient to whom he or she provides medical services by telemedicine is:  (1) informed of the relationship between the physician and patient and the respective role of any other health care provider with respect to management of the patient; and (2) notified that he or she may decline to receive medical services by telemedicine and may withdraw from such care at any time.      MOVEMENT DISORDERS CLINIC Followup  PCP/Referring Provider: No referring provider defined for this encounter.  Date of Service: 12/18/2023    Chief Complaint: Ataxia    Interval Hx    Since last visit,   Enrolling in [place] MSA trial - went last week  Wearing her BiPAP due to night-time and daytime stridor  Stopped carbidopa/levodopa 25/100mg 1 tab PO TID due to low BP events    Taking midodrine TID 10mg  Fludrocortisone 0.2mcg daily  BP still dropping to 60's and at time pre syncopal  Systolics not sustained over 160's  Has not tried an abdominal binder  Declined significantly in ADLs  Still Stopped working due to falls  Walker in home - can walk 20 feet with the walker- and motorized scooter for distance  Got a wheelchair    Uses her apnea device at night    Dyarthria worse   Falls weekly at least -into chair  Doing PT self guided    No change to sweating  Does have REM sleep behavior    Struggling to write  Can no longer do stairs.  Still having trouble writing checks   strength decreased   Speech is progressively slurred    Feels like memory is poor at times - trouble recalling names  Mild dyphagia - thickening liquids    Workup  MRI brain showed decrease in [place] contour suggesting of MSA  PAGE scans showed normal uptake  EMG was normal 2019  CT C/AB/PEL was neg for malignancy    MELVIN 65 NL  TPO POS  Notes her son was  "diagnosed with PANDAS and "has funny eye movements" - poor fine motor skills as a kid    Does have REM sleep behavior issues for 3 months    PD Review of Symptoms:  Anosmia: None  Dysarthria/Hypophonia: None  Dysphagia/Sialorrhea: None  Hallucinations: None  Depression: None  Cognitive slowing: None  Urinary changes: None  Constipation: at times  Orthostasis: none  Falls: as above  Micrographia: none  Sleep issues:  -SUSAN: at times snores  -RBD: Talks and punches in her sleep  -Sleep Quality: good, wakes every 3 hours"    "PriorHPI: Nirali Kirkpatrick is a L HANDED 58 y.o. female with a medical issues significant for HTN, Anxiety, Vit B12 deficiency, who presents with ataxia for 1.5 years. She first noted 1.5 years ago her R foot was dragging and every 3-4 steps she would catch that toe. She noticed her R foot would catch on curbs. Going down steps is especially hard - she mis-steps often. She feels like she has progressively been imbalanced. Her first fall was a year ago. She has fallen more often since then. She falls twice a month now. Falls typically sideways. She holds onto her  for stability but does not use an assist device. Tends to have more ataxia after she's tired. She occasionally has a had tremor in her hands when starting IVs. She does at times feel clumsy with her hands. No spinning vertigo. No lightheadedness. No visual issues.    No double vision, eye drooping, head drop, dysphagia.  She was told her B12 was low normal Aug 2018, and has had 4 shots since    No dysarthria.    She saw a general neurologist  B6 - NL  B12 - 333    Brain MRI read as normal  Spine and Tspine 2018 - no central disc issues  Has not had an EMG    No fam hx ataxia, MS  Uncle has M. Gravis  Mother and brother have had tremors      Review of Systems:   Review of Systems   Constitutional:  Negative for fever.   HENT:  Negative for congestion.    Eyes:  Negative for double vision.   Respiratory:  Negative for cough and " shortness of breath.    Cardiovascular:  Negative for chest pain and leg swelling.   Gastrointestinal:  Negative for nausea.   Genitourinary:  Negative for dysuria.   Musculoskeletal:  Positive for falls.   Skin:  Negative for rash.   Neurological:  Positive for tremors. Negative for speech change and headaches.   Psychiatric/Behavioral:  Negative for depression.          Current Medications:  Outpatient Encounter Medications as of 12/18/2023   Medication Sig Dispense Refill    b complex vitamins tablet Take 1 tablet by mouth once daily.      clonazePAM (KLONOPIN) 0.5 MG tablet TAKE 1 TABLET TWICE A DAY  AS NEEDED FOR ANXIETY 30 tablet 0    cyanocobalamin 1,000 mcg/mL injection 1,000 mcg every 30 days.       desvenlafaxine succinate (PRISTIQ) 100 MG Tb24 100 mg every evening.       fludrocortisone (FLORINEF) 0.1 mg Tab Take 2 tablets (200 mcg total) by mouth once daily. 60 tablet 0    hyoscyamine (ANASPAZ,LEVSIN) 0.125 mg Tab Take 1 tablet (125 mcg total) by mouth every 6 (six) hours as needed (urgency, urge incontinence). 120 tablet 11    midodrine (PROAMATINE) 10 MG tablet Take 1 tablet (10 mg total) by mouth 3 (three) times daily with meals. 90 tablet 11    zonisamide (ZONEGRAN) 25 MG Cap Take 1 capsule (25 mg total) by mouth 2 (two) times daily. 60 capsule 11    [DISCONTINUED] clonazePAM (KLONOPIN) 0.5 MG tablet TAKE 1 TABLET TWICE A DAY  AS NEEDED FOR ANXIETY 30 tablet 0     No facility-administered encounter medications on file as of 12/18/2023.       Past Medical History:  HTN    Past Surgical History:  Gallbladder, gastric sleeve    Current Living Situation: home    Social:  Social History     Socioeconomic History    Marital status:    Tobacco Use    Smoking status: Never    Smokeless tobacco: Never   Substance and Sexual Activity    Alcohol use: Not Currently    Drug use: Never   Social History Narrative    ** Merged History Encounter **            Family History:  As above    PHYSICAL:  There were  "no vitals taken for this visit.    Physical Exam  Constitutional: Well-developed, well-nourished, appears stated age  Eyes: No scleral icterus  ENT: Moist oral mucosa  Cardiovascular: No lower extremity edema   Respiratory: No labored breathing   Skin: No rash   Hematologic: No bruising    Other: GI/ deferred   Mental status: Alert and oriented to person, place, time, and situation;   follows commands  Speech: severe dysarthria, no aphasia  Cranial nerves:            CN II: Pupils mid-position and equal, not tested light or accommodation  CN III, IV, VI: Extraocular movements full, no nystagmus visualized  CN V: Not tested   CN VII: Face strong and symmetric bilaterally   CN VIII: Hearing intact to voice and conversation   CN IX, X: Palate raises midline and symmetric   CN XI: Strong shoulder shrug B/L  CN XII: Tongue appears midline   Motor: Normal bulk by appearance, no drift   Sensory: Not tested    Gait: Not tested  Deep tendon reflexes: Not tested  Movement/Coordination                    No hypophonic speech.                     No facial masking.  Mild bradykinesia.  Mild L hand resting tremor  FNF R>L intention tremor  No stridor                  Bradykinesia  ? Finger taps Finger flicks PEDRO Heel taps   Left 0 0 0 0   Right 1+ 0 0 0       Tremor Exam   Arms extended Arms in wing position, fingers almost touching Re-emergent Arms extended wrists extended Intention Resting Kinetic   Left ?++ ? ?+ ? ?++ ? ?   Right ?+ ? ? ? ?+ ? ?       "General Medical Examination:  General: Good hygiene, appropriate appearance.  HEENT: Normocephalic, atraumatic.   Neck: Supple.   Chest: Unlabored breathing.   CV: Symmetric pulses.   Ext: No clubbing, cyanosis, or edema.     Mental Status:  Mood/Affect: Appropriate/congruent.  Level of consciousness: Awake, alert.  Orientation: Oriented to person, place, time and situation.  Language: Mod Dysarthria    Cranial nerves:  I: Not tested  II: PERRL, VFF to counting  III, IV, VI: " "EOMI with conjugate gaze and no nystagmus on end gaze - no SWJs  V: Facial sensation intact and symmetric over the bilateral V1-V3  VII: Facial muscle activation intact and symmetric over the bilateral upper and lower face  VIII: Hearing intact in the b/l ears and symmetrical to finger rub  IX, X, XII: TUP midline - no atrophy or fasiculations  X: SCMs and shoulder shrug full strength b/l and symmetric  Trouble with PA PA TA TA  Trouble with GAs and Naomy      Motor:   Cannot squeeze and hold 's hands   When lifting her knees she cannot overcome her  pushing it down  No stridor    Hyperreflexic 3+ at pattelae    ? Finger taps Finger flicks PEDRO Heel taps   Left nl nl nl nl   Right nl nl Incoordinated nl   Neck tone: nl  ? Arm Leg   Left nl nl   Right nl nl         Coordination:   -Finger to nose: intention tremor mild bilat  Past-points 1 inch R hand >L hand  Disdiatochokinesias R hand moderate  Disdiatochokinesias L hand mild  -Heel to shin: mild issues R foot  R hand spiral +++  L hand spiral ++    No resting or postural tremor    Gait:  -Arises from chair without use of hands.  -Casual gait is: wide based, somewhat stiff and staggering, circumducts, waves moderately L to R when she walks - mod ataxic   -Stride length: nl  -Arm Swing: decreased R  -Turning: wide  -Tandem gait: cannot  No foot drop"      Laboratory Data:  Results for RADHA HAHN (MRN [id number]) as of 6/3/2019 09:52   Ref. Range 5/2/2019 11:23   Folate Latest Ref Range: 4.0 - 24.0 ng/mL 10.7   Vitamin B-12 Latest Ref Range: 180 - 914 ng/L 525   Sodium Latest Ref Range: 136 - 145 mmol/L 141   Potassium Latest Ref Range: 3.5 - 5.1 mmol/L 3.7   Chloride Latest Ref Range: 95 - 110 mmol/L 106   CO2 Latest Ref Range: 23 - 29 mmol/L 26   Anion Gap Latest Ref Range: 8 - 16 mmol/L 9   BUN, Bld Latest Ref Range: 6 - 20 mg/dL 22 (H)   Creatinine Latest Ref Range: 0.5 - 1.4 mg/dL 0.8   eGFR if non African American Latest Ref Range: >60 " mL/min/1.73 m^2 >60.0   eGFR if African American Latest Ref Range: >60 mL/min/1.73 m^2 >60.0   Glucose Latest Ref Range: 70 - 110 mg/dL 92   Calcium Latest Ref Range: 8.7 - 10.5 mg/dL 9.8   Alkaline Phosphatase Latest Ref Range: 55 - 135 U/L 86   PROTEIN TOTAL Latest Ref Range: 6.0 - 8.4 g/dL 7.4   Albumin Latest Ref Range: 3.5 - 5.2 g/dL 4.2   BILIRUBIN TOTAL Latest Ref Range: 0.1 - 1.0 mg/dL 0.6   AST Latest Ref Range: 10 - 40 U/L 19   ALT Latest Ref Range: 10 - 44 U/L 16   Ammonia Latest Ref Range: 10 - 50 umol/L 26   Arsenic Latest Ref Range: 0 - 12 ng/mL <1   Thiamine Latest Ref Range: 38 - 122 ug/L 63   Vitamin E Latest Ref Range: 500 - 1800 ug/dL 1302   Glutamic Acid Decarb Ab Latest Ref Range: <=0.02 nmol/L 0.00   TSH Latest Ref Range: 0.400 - 4.000 uIU/mL 0.649   PTH Latest Ref Range: 9.0 - 77.0 pg/mL 103.0 (H)   Race Unknown Test Not Performed   AChR Binding Ab, Serum Latest Ref Range: <=0.02 nmol/L 0.00   AChR Ganglionic Neuronal Ab Latest Ref Range: <=0.02 nmol/L 0.00   CRMP-5 IgG Latest Ref Range: <1:240 titer Negative   Neuronal (V-G) K+ Channel Ab, Serum Latest Ref Range: <=0.02 nmol/L 0.00   NMO Interpretive Comments Unknown SEE BELOW   N-Type Calcium Channel Ab Latest Ref Range: <=0.03 nmol/L 0.00   P/Q Type Calcium Channel Ab Latest Ref Range: <=0.02 nmol/L 0.00   PAVAL AGNA-1, Serum Latest Ref Range: <1:240 titer Negative   PAVAL TOBY-1, Serum Latest Ref Range: <1:240 titer Negative   PAVAL TOBY-2, Serum Latest Ref Range: <1:240 titer Negative   PAVAL TOBY-3, Serum Latest Ref Range: <1:240 titer Negative   PAVAL reflex test added Unknown None.   PAVAL,  Amphiphysin Ab, Serum Latest Ref Range: <1:240 titer Negative   PAVAL, PCA-1, Serum Latest Ref Range: <1:240 titer Negative   PAVAL, PCA-2, Serum Latest Ref Range: <1:240 titer Negative   PAVAL, PCA-Tr, Serum Latest Ref Range: <1:240 titer Negative   STRIATED MUSCLE AB Latest Ref Range: <1:120 titer Negative   Lead Latest Ref Range: 0.0 - 4.9  mcg/dL <1.0   Cadmium Latest Ref Range: 0.0 - 4.9 ng/mL 0.3   City Unknown Test Not Performed   County Unknown Test Not Performed   Guardian First Name Unknown Test Not Performed   Guardian Last Name Unknown Test Not Performed   Home Phone Unknown Test Not Performed   [place] Latest Ref Range: 0 - 9 ng/mL 2   State Unknown Test Not Performed   Street Address Unknown Test Not Performed   Venous/Capillary Unknown Test Not Performed   Zip Unknown Test Not Performed      Results for RADHA HAHN (MRN [id number]) as of 8/23/2019 10:10   Ref. Range 6/3/2019 10:27   Sed Rate Latest Ref Range: 0 - 36 mm/Hr 13   CRP Latest Ref Range: 0.0 - 8.2 mg/L 0.5   CPK Latest Ref Range: 20 - 180 U/L 55   CERULOPLASMIN Latest Ref Range: 15.0 - 45.0 mg/dL 33.0   Acetylchol Modul Ab Latest Units: % 0   AChR Binding Ab, Serum Latest Ref Range: <=0.02 nmol/L 0.00   MG Interpretive Comments Unknown SEE BELOW   MuSK Antibody Test Latest Ref Range: 0.00 - 0.02 nmol/L 0.00   STRIATED MUSCLE AB Latest Ref Range: <1:120 titer Negative   Aldolase Latest Ref Range: 1.2 - 7.6 U/L 3.5       Imaging:  Brain MRI w/o cerebellar degeneration or lesion - however poor quality MRI  Repeat brain MRI  FINDINGS:  MRI of the brain demonstrates no infarction, mass, hemorrhage, extra-axial collection, or other acute finding.  A skull base lesions are seen.  Following contrast administration, there is no abnormal enhancing lesion identified.  A small incidental developmental venous anomaly is seen in the right medial posterior thalamus.  Cspine w/o without central impingement    EMG  Summary   Nerve conduction study of bilateral lower extremities revealed normal antidromic sensory peak latencies, action potentials, and conduction velocities.   Motor peak latencies, amplitudes, and conduction velocities were normal. F-waves were normal.   Concentric needle examination of selected muscles in bilateral lower extremities revealed no evidence of acute or chronic  denervation.   Impression   This is a normal study.     Assessment//Plan:   Problem List Items Addressed This Visit          Neuro    Ataxia - Primary    Current Assessment & Plan     Moderate progressive ataxia, worsening.  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  Dysautonomia which also fits.  Starting with [place] investigational study  Suggested continue exercising for balance.  Suggetsed start hobbies for manual dexterity    Stopped Ldopa and does not miss this - it was causing low BP            Cardiac/Vascular    Orthostasis    Current Assessment & Plan     Some dizziness on standing continues  Suggested add abdominal binder  Suggested try midodrine 10mg TID  Added fludrocortisone 0.2mg QAM          Ondina Polo MD, MS  [facility]  Department of Neurology  Movement Disorders

## 2023-12-18 NOTE — ASSESSMENT & PLAN NOTE
Moderate progressive ataxia, worsening.  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  Dysautonomia which also fits.  Starting with [place] investigational study  Suggested continue exercising for balance.  Suggetsed start hobbies for manual dexterity    Stopped Ldopa and does not miss this - it was causing low BP

## 2023-12-18 NOTE — ASSESSMENT & PLAN NOTE
Some dizziness on standing continues  Suggested add abdominal binder  Suggested try midodrine 10mg TID  Added fludrocortisone 0.2mg QAM

## 2023-12-26 ENCOUNTER — PATIENT MESSAGE (OUTPATIENT)
Dept: NEUROLOGY | Facility: CLINIC | Age: 58
End: 2023-12-26
Payer: MEDICARE

## 2023-12-26 RX ORDER — FLUDROCORTISONE ACETATE 0.1 MG/1
200 TABLET ORAL DAILY
Qty: 60 TABLET | Refills: 0 | Status: SHIPPED | OUTPATIENT
Start: 2023-12-26 | End: 2024-01-26 | Stop reason: SDUPTHER

## 2024-01-26 ENCOUNTER — PATIENT MESSAGE (OUTPATIENT)
Dept: NEUROLOGY | Facility: CLINIC | Age: 59
End: 2024-01-26
Payer: MEDICARE

## 2024-01-26 DIAGNOSIS — R06.02 SHORTNESS OF BREATH: Primary | ICD-10-CM

## 2024-01-26 RX ORDER — FLUDROCORTISONE ACETATE 0.1 MG/1
200 TABLET ORAL DAILY
Qty: 60 TABLET | Refills: 0 | Status: SHIPPED | OUTPATIENT
Start: 2024-01-26 | End: 2024-02-23 | Stop reason: SDUPTHER

## 2024-01-26 NOTE — TELEPHONE ENCOUNTER
Received request for medication Fludrocortisone 0.1 mg tab 200 mcg po daily   Pt reports she is out of meds.  Phoned in to [facility] in [place], [place]  Routed to Dr. Vance

## 2024-02-22 ENCOUNTER — PATIENT MESSAGE (OUTPATIENT)
Dept: UROLOGY | Facility: CLINIC | Age: 59
End: 2024-02-22
Payer: MEDICARE

## 2024-02-22 ENCOUNTER — PATIENT MESSAGE (OUTPATIENT)
Dept: NEUROLOGY | Facility: CLINIC | Age: 59
End: 2024-02-22
Payer: MEDICARE

## 2024-02-22 DIAGNOSIS — R06.02 SHORTNESS OF BREATH: ICD-10-CM

## 2024-02-23 RX ORDER — FLUDROCORTISONE ACETATE 0.1 MG/1
200 TABLET ORAL DAILY
Qty: 180 TABLET | Refills: 0 | Status: SHIPPED | OUTPATIENT
Start: 2024-02-23 | End: 2024-05-23

## 2024-02-23 NOTE — TELEPHONE ENCOUNTER
Phoned in refill for fludrocortisone 200 mcg per day.  To [facility] pharmacy in [place]  Informed pt via [employer]

## 2024-03-06 ENCOUNTER — PATIENT MESSAGE (OUTPATIENT)
Dept: NEUROLOGY | Facility: CLINIC | Age: 59
End: 2024-03-06
Payer: MEDICARE

## 2024-03-27 ENCOUNTER — PATIENT MESSAGE (OUTPATIENT)
Dept: UROLOGY | Facility: CLINIC | Age: 59
End: 2024-03-27
Payer: MEDICARE

## 2024-04-07 ENCOUNTER — PATIENT MESSAGE (OUTPATIENT)
Dept: NEUROLOGY | Facility: CLINIC | Age: 59
End: 2024-04-07
Payer: MEDICARE

## 2024-04-22 ENCOUNTER — TELEPHONE (OUTPATIENT)
Dept: NEUROLOGY | Facility: CLINIC | Age: 59
End: 2024-04-22
Payer: MEDICARE

## 2024-04-22 ENCOUNTER — PATIENT MESSAGE (OUTPATIENT)
Dept: NEUROLOGY | Facility: CLINIC | Age: 59
End: 2024-04-22
Payer: MEDICARE

## 2024-04-22 ENCOUNTER — PATIENT MESSAGE (OUTPATIENT)
Dept: NEUROLOGY | Facility: CLINIC | Age: 59
End: 2024-04-22

## 2024-04-22 ENCOUNTER — OFFICE VISIT (OUTPATIENT)
Dept: NEUROLOGY | Facility: CLINIC | Age: 59
End: 2024-04-22
Payer: MEDICARE

## 2024-04-22 DIAGNOSIS — G90.3 MULTIPLE SYSTEM ATROPHY: Primary | ICD-10-CM

## 2024-04-22 DIAGNOSIS — G23.8 MULTIPLE SYSTEM ATROPHY: Primary | ICD-10-CM

## 2024-04-22 DIAGNOSIS — G70.9 RESTRICTIVE LUNG MECHANICS DUE TO NEUROMUSCULAR DISEASE: ICD-10-CM

## 2024-04-22 DIAGNOSIS — J98.4 RESTRICTIVE LUNG MECHANICS DUE TO NEUROMUSCULAR DISEASE: ICD-10-CM

## 2024-04-22 DIAGNOSIS — R06.02 SHORTNESS OF BREATH: ICD-10-CM

## 2024-04-22 PROCEDURE — 99214 OFFICE O/P EST MOD 30 MIN: CPT | Mod: 95,,, | Performed by: PSYCHIATRY & NEUROLOGY

## 2024-04-22 NOTE — PROGRESS NOTES
The patient location is: HOME  The chief complaint leading to visit is: MSA  1. Multiple system atrophy        2. Shortness of breath        3. 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      MOVEMENT DISORDERS CLINIC Followup  PCP/Referring Provider: No referring provider defined for this encounter.  Date of Service: 4/22/2024    Chief Complaint: Ataxia    Interval Hx    Since last visit,   Started a clinical trial  Walking better- better stride length    Wearing her BiPAP due to night-time and daytime stridor    Taking midodrine TID 10mg  Fludrocortisone 0.2mcg daily  BP still dropping to 60's and at time pre syncopal  Systolics not sustained over 160's      Has not tried an abdominal binder  Declined significantly in ADLs  Still Stopped working due to falls  Walker in home - can walk 30-40 feet with the walker- and motorized scooter for distance  Got a wheelchair    Dyarthria worse   Falls weekly at least -into chair  Doing PT self guided  Swallowing better    Tremoring more    No change to sweating  Does have REM sleep behavior    Struggling to write  Can no longer do stairs.  Still having trouble writing checks   strength decreased   Speech is progressively slurred but an come and go throughout the day    Feels like memory is poor at times - trouble recalling names    Workup  MRI brain showed decrease in [place] contour suggesting of MSA  PAGE scans showed normal uptake  EMG was normal 2019  CT C/AB/PEL was neg for  "malignancy    MELVIN 65 NL  TPO POS  Notes her son was diagnosed with PANDAS and "has funny eye movements" - poor fine motor skills as a kid    Does have REM sleep behavior issues for 3 months    PD Review of Symptoms:  Anosmia: None  Dysarthria/Hypophonia: None  Dysphagia/Sialorrhea: None  Hallucinations: None  Depression: None  Cognitive slowing: None  Urinary changes: None  Constipation: at times  Orthostasis: none  Falls: as above  Micrographia: none  Sleep issues:  -SUSAN: at times snores  -RBD: Talks and punches in her sleep  -Sleep Quality: good, wakes every 3 hours"    "PriorHPI: Nirali Kirkpatrick is a L HANDED 58 y.o. female with a medical issues significant for HTN, Anxiety, Vit B12 deficiency, who presents with ataxia for 1.5 years. She first noted 1.5 years ago her R foot was dragging and every 3-4 steps she would catch that toe. She noticed her R foot would catch on curbs. Going down steps is especially hard - she mis-steps often. She feels like she has progressively been imbalanced. Her first fall was a year ago. She has fallen more often since then. She falls twice a month now. Falls typically sideways. She holds onto her  for stability but does not use an assist device. Tends to have more ataxia after she's tired. She occasionally has a had tremor in her hands when starting IVs. She does at times feel clumsy with her hands. No spinning vertigo. No lightheadedness. No visual issues.    No double vision, eye drooping, head drop, dysphagia.  She was told her B12 was low normal Aug 2018, and has had 4 shots since    No dysarthria.    She saw a general neurologist  B6 - NL  B12 - 333    Brain MRI read as normal  Spine and Tspine 2018 - no central disc issues  Has not had an EMG    No fam hx ataxia, MS  Uncle has M. Gravis  Mother and brother have had tremors      Review of Systems:   Review of Systems   Constitutional:  Negative for fever.   HENT:  Negative for congestion.    Eyes:  Negative for double " vision.   Respiratory:  Negative for cough and shortness of breath.    Cardiovascular:  Negative for chest pain and leg swelling.   Gastrointestinal:  Negative for nausea.   Genitourinary:  Negative for dysuria.   Musculoskeletal:  Positive for falls.   Skin:  Negative for rash.   Neurological:  Positive for tremors. Negative for speech change and headaches.   Psychiatric/Behavioral:  Negative for depression.          Current Medications:  Outpatient Encounter Medications as of 4/22/2024   Medication Sig Dispense Refill    b complex vitamins tablet Take 1 tablet by mouth once daily.      clonazePAM (KLONOPIN) 0.5 MG tablet TAKE 1 TABLET TWICE A DAY  AS NEEDED FOR ANXIETY 30 tablet 0    cyanocobalamin 1,000 mcg/mL injection 1,000 mcg every 30 days.       desvenlafaxine succinate (PRISTIQ) 100 MG Tb24 100 mg every evening.       fludrocortisone (FLORINEF) 0.1 mg Tab Take 2 tablets (200 mcg total) by mouth once daily. 180 tablet 0    hyoscyamine (ANASPAZ,LEVSIN) 0.125 mg Tab Take 1 tablet (125 mcg total) by mouth every 6 (six) hours as needed (urgency, urge incontinence). 120 tablet 11    midodrine (PROAMATINE) 10 MG tablet Take 1 tablet (10 mg total) by mouth 3 (three) times daily with meals. 90 tablet 11    [DISCONTINUED] zonisamide (ZONEGRAN) 25 MG Cap Take 1 capsule (25 mg total) by mouth 2 (two) times daily. 60 capsule 11     No facility-administered encounter medications on file as of 4/22/2024.       Past Medical History:  HTN    Past Surgical History:  Gallbladder, gastric sleeve    Current Living Situation: home    Social:  Social History     Socioeconomic History    Marital status:    Tobacco Use    Smoking status: Never    Smokeless tobacco: Never   Substance and Sexual Activity    Alcohol use: Not Currently    Drug use: Never   Social History Narrative    ** Merged History Encounter **          Social Determinants of Health     Financial Resource Strain: Medium Risk (4/22/2024)    Overall Financial  Resource Strain (CARDIA)     Difficulty of Paying Living Expenses: Somewhat hard   Food Insecurity: Food Insecurity Present (4/22/2024)    Hunger Vital Sign     Worried About Running Out of Food in the Last Year: Sometimes true     Ran Out of Food in the Last Year: Sometimes true   Transportation Needs: Unknown (4/22/2024)    PRAPARE - Transportation     Lack of Transportation (Non-Medical): No   Physical Activity: Unknown (4/22/2024)    Exercise Vital Sign     Days of Exercise per Week: 0 days   Stress: Stress Concern Present (4/22/2024)    [employer] - Occupational Stress Questionnaire     Feeling of Stress : Rather much   Social Connections: Unknown (4/22/2024)    Social Connection and Isolation Panel [NHANES]     Frequency of Communication with Friends and Family: Three times a week     Frequency of Social Gatherings with Friends and Family: Once a week     Active Member of Clubs or Organizations: No     Marital Status:    Housing Stability: Unknown (4/22/2024)    Housing Stability Vital Sign     Unable to Pay for Housing in the Last Year: No       Family History:  As above    PHYSICAL:  There were no vitals taken for this visit.    Physical Exam  Constitutional: Well-developed, well-nourished, appears stated age  Eyes: No scleral icterus  ENT: Moist oral mucosa  Cardiovascular: No lower extremity edema   Respiratory: No labored breathing   Skin: No rash   Hematologic: No bruising    Other: GI/ deferred   Mental status: Alert and oriented to person, place, time, and situation;   follows commands  Speech: severe dysarthria, no aphasia  Cranial nerves:            CN II: Pupils mid-position and equal, not tested light or accommodation  CN III, IV, VI: Extraocular movements full, no nystagmus visualized  CN V: Not tested   CN VII: Face strong and symmetric bilaterally   CN VIII: Hearing intact to voice and conversation   CN IX, X: Palate raises midline and symmetric   CN XI:  "Strong shoulder shrug B/L  CN XII: Tongue appears midline   Motor: Normal bulk by appearance, no drift   Sensory: Not tested    Gait: Not tested  Deep tendon reflexes: Not tested  Movement/Coordination                    No hypophonic speech.                     No facial masking.  Mild bradykinesia.  Mild L hand resting tremor  FNF R>L intention tremor  No stridor                  Bradykinesia  ? Finger taps Finger flicks PEDRO Heel taps   Left 0 0 0 0   Right 1+ 0 0 0       Tremor Exam   Arms extended Arms in wing position, fingers almost touching Re-emergent Arms extended wrists extended Intention Resting Kinetic   Left ?++ ? ?+ ? ?++ ? ?   Right ?+ ? ? ? ?+ ? ?       "General Medical Examination:  General: Good hygiene, appropriate appearance.  HEENT: Normocephalic, atraumatic.   Neck: Supple.   Chest: Unlabored breathing.   CV: Symmetric pulses.   Ext: No clubbing, cyanosis, or edema.     Mental Status:  Mood/Affect: Appropriate/congruent.  Level of consciousness: Awake, alert.  Orientation: Oriented to person, place, time and situation.  Language: Mod Dysarthria    Cranial nerves:  I: Not tested  II: PERRL, VFF to counting  III, IV, VI: EOMI with conjugate gaze and no nystagmus on end gaze - no SWJs  V: Facial sensation intact and symmetric over the bilateral V1-V3  VII: Facial muscle activation intact and symmetric over the bilateral upper and lower face  VIII: Hearing intact in the b/l ears and symmetrical to finger rub  IX, X, XII: TUP midline - no atrophy or fasiculations  X: SCMs and shoulder shrug full strength b/l and symmetric  Trouble with PA PA TA TA  Trouble with GAs and Naomy      Motor:   Cannot squeeze and hold 's hands   When lifting her knees she cannot overcome her  pushing it down  No stridor    Hyperreflexic 3+ at pattelae    ? Finger taps Finger flicks PEDRO Heel taps   Left nl nl nl nl   Right nl nl Incoordinated nl   Neck tone: nl  ? Arm Leg   Left nl nl   Right nl nl " "        Coordination:   -Finger to nose: intention tremor mild bilat  Past-points 1 inch R hand >L hand  Disdiatochokinesias R hand moderate  Disdiatochokinesias L hand mild  -Heel to shin: mild issues R foot  R hand spiral +++  L hand spiral ++    No resting or postural tremor    Gait:  -Arises from chair without use of hands.  -Casual gait is: wide based, somewhat stiff and staggering, circumducts, waves moderately L to R when she walks - mod ataxic   -Stride length: nl  -Arm Swing: decreased R  -Turning: wide  -Tandem gait: cannot  No foot drop"      Laboratory Data:  Results for RADHA HAHN (MRN [id number]) as of 6/3/2019 09:52   Ref. Range 5/2/2019 11:23   Folate Latest Ref Range: 4.0 - 24.0 ng/mL 10.7   Vitamin B-12 Latest Ref Range: 180 - 914 ng/L 525   Sodium Latest Ref Range: 136 - 145 mmol/L 141   Potassium Latest Ref Range: 3.5 - 5.1 mmol/L 3.7   Chloride Latest Ref Range: 95 - 110 mmol/L 106   CO2 Latest Ref Range: 23 - 29 mmol/L 26   Anion Gap Latest Ref Range: 8 - 16 mmol/L 9   BUN, Bld Latest Ref Range: 6 - 20 mg/dL 22 (H)   Creatinine Latest Ref Range: 0.5 - 1.4 mg/dL 0.8   eGFR if non African American Latest Ref Range: >60 mL/min/1.73 m^2 >60.0   eGFR if African American Latest Ref Range: >60 mL/min/1.73 m^2 >60.0   Glucose Latest Ref Range: 70 - 110 mg/dL 92   Calcium Latest Ref Range: 8.7 - 10.5 mg/dL 9.8   Alkaline Phosphatase Latest Ref Range: 55 - 135 U/L 86   PROTEIN TOTAL Latest Ref Range: 6.0 - 8.4 g/dL 7.4   Albumin Latest Ref Range: 3.5 - 5.2 g/dL 4.2   BILIRUBIN TOTAL Latest Ref Range: 0.1 - 1.0 mg/dL 0.6   AST Latest Ref Range: 10 - 40 U/L 19   ALT Latest Ref Range: 10 - 44 U/L 16   Ammonia Latest Ref Range: 10 - 50 umol/L 26   Arsenic Latest Ref Range: 0 - 12 ng/mL <1   Thiamine Latest Ref Range: 38 - 122 ug/L 63   Vitamin E Latest Ref Range: 500 - 1800 ug/dL 1302   Glutamic Acid Decarb Ab Latest Ref Range: <=0.02 nmol/L 0.00   TSH Latest Ref Range: 0.400 - 4.000 uIU/mL 0.649   PTH " Latest Ref Range: 9.0 - 77.0 pg/mL 103.0 (H)   Race Unknown Test Not Performed   AChR Binding Ab, Serum Latest Ref Range: <=0.02 nmol/L 0.00   AChR Ganglionic Neuronal Ab Latest Ref Range: <=0.02 nmol/L 0.00   CRMP-5 IgG Latest Ref Range: <1:240 titer Negative   Neuronal (V-G) K+ Channel Ab, Serum Latest Ref Range: <=0.02 nmol/L 0.00   NMO Interpretive Comments Unknown SEE BELOW   N-Type Calcium Channel Ab Latest Ref Range: <=0.03 nmol/L 0.00   P/Q Type Calcium Channel Ab Latest Ref Range: <=0.02 nmol/L 0.00   PAVAL AGNA-1, Serum Latest Ref Range: <1:240 titer Negative   PAVAL TOBY-1, Serum Latest Ref Range: <1:240 titer Negative   PAVAL TOBY-2, Serum Latest Ref Range: <1:240 titer Negative   PAVAL TOBY-3, Serum Latest Ref Range: <1:240 titer Negative   PAVAL reflex test added Unknown None.   PAVAL,  Amphiphysin Ab, Serum Latest Ref Range: <1:240 titer Negative   PAVAL, PCA-1, Serum Latest Ref Range: <1:240 titer Negative   PAVAL, PCA-2, Serum Latest Ref Range: <1:240 titer Negative   PAVAL, PCA-Tr, Serum Latest Ref Range: <1:240 titer Negative   STRIATED MUSCLE AB Latest Ref Range: <1:120 titer Negative   Lead Latest Ref Range: 0.0 - 4.9 mcg/dL <1.0   Cadmium Latest Ref Range: 0.0 - 4.9 ng/mL 0.3   City Unknown Test Not Performed   County Unknown Test Not Performed   Guardian First Name Unknown Test Not Performed   Guardian Last Name Unknown Test Not Performed   Home Phone Unknown Test Not Performed   [place] Latest Ref Range: 0 - 9 ng/mL 2   State Unknown Test Not Performed   Street Address Unknown Test Not Performed   Venous/Capillary Unknown Test Not Performed   Zip Unknown Test Not Performed      Results for RADHA HAHN (MRN [id number]) as of 8/23/2019 10:10   Ref. Range 6/3/2019 10:27   Sed Rate Latest Ref Range: 0 - 36 mm/Hr 13   CRP Latest Ref Range: 0.0 - 8.2 mg/L 0.5   CPK Latest Ref Range: 20 - 180 U/L 55   CERULOPLASMIN Latest Ref Range: 15.0 - 45.0 mg/dL 33.0   Acetylchol Modul Ab Latest Units: % 0    AChR Binding Ab, Serum Latest Ref Range: <=0.02 nmol/L 0.00   MG Interpretive Comments Unknown SEE BELOW   MuSK Antibody Test Latest Ref Range: 0.00 - 0.02 nmol/L 0.00   STRIATED MUSCLE AB Latest Ref Range: <1:120 titer Negative   Aldolase Latest Ref Range: 1.2 - 7.6 U/L 3.5       Imaging:  Brain MRI w/o cerebellar degeneration or lesion - however poor quality MRI  Repeat brain MRI  FINDINGS:  MRI of the brain demonstrates no infarction, mass, hemorrhage, extra-axial collection, or other acute finding.  A skull base lesions are seen.  Following contrast administration, there is no abnormal enhancing lesion identified.  A small incidental developmental venous anomaly is seen in the right medial posterior thalamus.  Cspine w/o without central impingement    EMG  Summary   Nerve conduction study of bilateral lower extremities revealed normal antidromic sensory peak latencies, action potentials, and conduction velocities.   Motor peak latencies, amplitudes, and conduction velocities were normal. F-waves were normal.   Concentric needle examination of selected muscles in bilateral lower extremities revealed no evidence of acute or chronic denervation.   Impression   This is a normal study.     Assessment//Plan:   Problem List Items Addressed This Visit          Neuro    Multiple system atrophy - Primary    Current Assessment & Plan     Moderate progressive ataxia, more stable after staring an investigational trial  Given her MRI brain and advancing REM sleep dz I explained that this could be consistent with MSA-C.  Dysautonomia which also fits.  Started a trial at [place]    Suggested continue exercising for balance.  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

## 2024-04-22 NOTE — ASSESSMENT & PLAN NOTE
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  Dysautonomia which also fits.  Started a trial at [place]    Suggested continue exercising for balance.  Suggetsed start hobbies for manual dexterity  Dysphagia nit advancing

## 2024-04-22 NOTE — TELEPHONE ENCOUNTER
----- Message from Abdirahman Daly sent at 4/22/2024  8:39 AM CDT -----  Regarding: Appt Advice  Contact: [phone]  Pt calling to speak with someone in provider office regarding appt on 4/22. States she cannot do virtual appt because she is in [place].  Please call pt back [phone]

## 2024-04-25 ENCOUNTER — OFFICE VISIT (OUTPATIENT)
Dept: UROLOGY | Facility: CLINIC | Age: 59
End: 2024-04-25
Payer: MEDICARE

## 2024-04-25 ENCOUNTER — TELEPHONE (OUTPATIENT)
Dept: UROLOGY | Facility: CLINIC | Age: 59
End: 2024-04-25
Payer: MEDICARE

## 2024-04-25 VITALS — HEART RATE: 91 BPM | DIASTOLIC BLOOD PRESSURE: 72 MMHG | SYSTOLIC BLOOD PRESSURE: 106 MMHG

## 2024-04-25 DIAGNOSIS — R33.9 INCOMPLETE BLADDER EMPTYING: ICD-10-CM

## 2024-04-25 DIAGNOSIS — N39.0 RECURRENT UTI: Primary | ICD-10-CM

## 2024-04-25 PROCEDURE — 51701 INSERT BLADDER CATHETER: CPT | Mod: PBBFAC | Performed by: UROLOGY

## 2024-04-25 PROCEDURE — 99999 PR PBB SHADOW E&M-EST. PATIENT-LVL III: CPT | Mod: PBBFAC,,, | Performed by: UROLOGY

## 2024-04-25 PROCEDURE — 99215 OFFICE O/P EST HI 40 MIN: CPT | Mod: S$PBB,25,, | Performed by: UROLOGY

## 2024-04-25 PROCEDURE — 51701 INSERT BLADDER CATHETER: CPT | Mod: S$PBB,,, | Performed by: UROLOGY

## 2024-04-25 PROCEDURE — 99213 OFFICE O/P EST LOW 20 MIN: CPT | Mod: PBBFAC | Performed by: UROLOGY

## 2024-04-25 PROCEDURE — 87086 URINE CULTURE/COLONY COUNT: CPT | Performed by: UROLOGY

## 2024-04-25 RX ORDER — DOXYCYCLINE HYCLATE 100 MG
100 TABLET ORAL ONCE
Status: CANCELLED | OUTPATIENT
Start: 2024-04-25 | End: 2024-04-25

## 2024-04-25 RX ORDER — LIDOCAINE HYDROCHLORIDE 20 MG/ML
JELLY TOPICAL ONCE
Status: CANCELLED | OUTPATIENT
Start: 2024-04-25 | End: 2024-04-25

## 2024-04-25 NOTE — TELEPHONE ENCOUNTER
----- Message from Susan Mcgee MD sent at 4/25/2024  4:14 PM CDT -----  Raphael,please schedule suds/cysto.

## 2024-04-25 NOTE — PROGRESS NOTES
CHIEF COMPLAINT:    Mr. Kirkpatrick is a 58 y.o. female presenting for a consultation. Patient presents with recurrent UTI.    PRESENTING ILLNESS:    Nirali Kirkpatrick is a 58 y.o. female who has a history of recurrent UTI.  She reports having 5 UTI's within the last year.  Her symptoms include burning with urination, dysuria, and malodorous urine. She has not been admitted for a UTI in the past.  She states she has had positive urine cultures in the past. She also has a history of transobturator sling placed July 2021 and her recurrent UTIs began November 2022. She is involved in a clinical trial for MSA at [place]. She was recently prescribed Bactrim by her HCP at [facility] for her current UTI and is nearing the end of her course. Because of the nature of the clinical trial, she has an extensive list of medications she is not able to take which include several antibiotics (cipro, nitrofurantoin, norfloxacin, PCN V, rifampin) and any form of estrogen.     Patient denies a history of breast cancer.  She is not on an estrogen receptor blocker.       REVIEW OF SYSTEMS:    Review of Systems   Constitutional: Negative.    HENT: Negative.     Eyes: Negative.    Respiratory: Negative.     Cardiovascular: Negative.    Gastrointestinal: Negative.    Genitourinary:  Positive for dysuria.   Skin: Negative.    Neurological:  Positive for tremors and weakness.        MSA   Endo/Heme/Allergies: Negative.    Psychiatric/Behavioral: Negative.         PATIENT HISTORY:    Past Medical History:   Diagnosis Date    Depression     Hypertension        Past Surgical History:   Procedure Laterality Date    CHOLECYSTECTOMY      CYSTOSCOPY N/A 6/15/2021    Procedure: CYSTOSCOPY;  Surgeon: Susan Mcgee MD;  Location: [facility] OR [address] [place] [address];  Service: Urology;  Laterality: N/A;    CYSTOSCOPY N/A 7/13/2021    Procedure: CYSTOSCOPY;  Surgeon: Susan Mcgee MD;  Location: [facility] OR [address] [place] [address];  Service: Urology;  Laterality: N/A;    FLUOROSCOPIC  URODYNAMIC STUDY N/A 6/15/2021    Procedure: URODYNAMIC STUDY, FLUOROSCOPIC;  Surgeon: Susan Mcgee MD;  Location: [facility] OR 1ST FLR;  Service: Urology;  Laterality: N/A;  90 MINUTES     Gastric sleeve  2013    INSERTION OF MIDURETHRAL SLING N/A 7/13/2021    Procedure: SLING, MIDURETHRAL;  Surgeon: Susan Mcgee MD;  Location: [facility] OR 2ND FLR;  Service: Urology;  Laterality: N/A;  1 HOUR     tubligation  2004       Family History   Problem Relation Name Age of Onset    Stroke Mother      Hypertension Mother      Heart disease Mother      Heart disease Father      Lung disease Father      Hyperlipidemia Father      Hypertension Father         Social History     Socioeconomic History    Marital status:    Tobacco Use    Smoking status: Never    Smokeless tobacco: Never   Substance and Sexual Activity    Alcohol use: Not Currently    Drug use: Never   Social History Narrative    ** Merged History Encounter **          Social Determinants of Health     Financial Resource Strain: Medium Risk (4/22/2024)    Overall Financial Resource Strain (CARDIA)     Difficulty of Paying Living Expenses: Somewhat hard   Food Insecurity: Food Insecurity Present (4/22/2024)    Hunger Vital Sign     Worried About Running Out of Food in the Last Year: Sometimes true     Ran Out of Food in the Last Year: Sometimes true   Transportation Needs: Unknown (4/22/2024)    PRAPARE - Transportation     Lack of Transportation (Non-Medical): No   Physical Activity: Unknown (4/22/2024)    Exercise Vital Sign     Days of Exercise per Week: 0 days   Stress: Stress Concern Present (4/22/2024)    [employer] - Occupational Stress Questionnaire     Feeling of Stress : Rather much   Social Connections: Unknown (4/22/2024)    Social Connection and Isolation Panel [NHANES]     Frequency of Communication with Friends and Family: Three times a week     Frequency of Social Gatherings with Friends and Family: Once a week      Active Member of Clubs or Organizations: No     Marital Status:    Housing Stability: Unknown (4/22/2024)    Housing Stability Vital Sign     Unable to Pay for Housing in the Last Year: No       Allergies:  Patient has no known allergies.    Medications:  Outpatient Encounter Medications as of 4/25/2024   Medication Sig Dispense Refill    b complex vitamins tablet Take 1 tablet by mouth once daily.      clonazePAM (KLONOPIN) 0.5 MG tablet TAKE 1 TABLET TWICE A DAY  AS NEEDED FOR ANXIETY 30 tablet 0    cyanocobalamin 1,000 mcg/mL injection 1,000 mcg every 30 days.       desvenlafaxine succinate (PRISTIQ) 100 MG Tb24 100 mg every evening.       fludrocortisone (FLORINEF) 0.1 mg Tab Take 2 tablets (200 mcg total) by mouth once daily. 180 tablet 0    midodrine (PROAMATINE) 10 MG tablet Take 1 tablet (10 mg total) by mouth 3 (three) times daily with meals. 90 tablet 11    hyoscyamine (ANASPAZ,LEVSIN) 0.125 mg Tab Take 1 tablet (125 mcg total) by mouth every 6 (six) hours as needed (urgency, urge incontinence). 120 tablet 11     No facility-administered encounter medications on file as of 4/25/2024.         PHYSICAL EXAMINATION:    The patient generally appears in good health, is appropriately interactive, and is in no apparent distress.    Skin: No lesions.    Mental: Cooperative with normal affect.    Neuro: Grossly intact.    HEENT: Normal. No evidence of lymphadenopathy.    Chest:  normal inspiratory effort.    Abdomen: Soft, non-tender. No masses or organomegaly. Bladder is not palpable. No evidence of flank discomfort. No evidence of inguinal hernia.    Extremities: No clubbing, cyanosis, or edema    Normal external female genitalia  Urethral meatus is normal  Urethra and bladder are nontender to bimanual exam  Well supported anteriorly and posteriorly   No adnexal masses  PVR by catheterization 310 mL    LABS:    Lab Results   Component Value Date    BUN 20 07/14/2021    CREATININE 0.7 07/14/2021        IMPRESSION:    Encounter Diagnoses   Name Primary?    Recurrent UTI Yes    Incomplete bladder emptying        PLAN:    1. The catheterized specimen was sent for culture  2.  Cystoscopy and SUDS  3.  Probiotics are available over the counter.  Optimum dose is 25-50 billion.  Make sure it has multiple strains (greater than 10).  If you would like a specific one, [facility] Ultra 25 Billion CFU Probiotic Complex, Multi Strain.  The Multi Strain is specifically the one that is important as the greater variety of strains is better.  Make sure the product is not .    4.  Topical estrogen was not prescribed according to the protocol for her MSA study.     I spent 40 minutes with the patient of which more than half was spent in direct consultation with the patient in regards to our treatment and plan.

## 2024-04-26 ENCOUNTER — PATIENT MESSAGE (OUTPATIENT)
Dept: UROLOGY | Facility: CLINIC | Age: 59
End: 2024-04-26
Payer: MEDICARE

## 2024-04-26 LAB — BACTERIA UR CULT: NO GROWTH

## 2024-04-28 ENCOUNTER — PATIENT MESSAGE (OUTPATIENT)
Dept: UROLOGY | Facility: CLINIC | Age: 59
End: 2024-04-28
Payer: MEDICARE

## 2024-05-01 ENCOUNTER — HOSPITAL ENCOUNTER (OUTPATIENT)
Dept: RADIOLOGY | Facility: HOSPITAL | Age: 59
Discharge: HOME | End: 2024-05-01
Attending: UROLOGY
Payer: MEDICARE

## 2024-05-01 DIAGNOSIS — N39.0 RECURRENT UTI: ICD-10-CM

## 2024-05-01 PROCEDURE — 76770 US EXAM ABDO BACK WALL COMP: CPT | Mod: 26,,, | Performed by: RADIOLOGY

## 2024-05-01 PROCEDURE — 76770 US EXAM ABDO BACK WALL COMP: CPT | Mod: TC

## 2024-05-09 ENCOUNTER — PROCEDURE VISIT (OUTPATIENT)
Dept: UROLOGY | Facility: CLINIC | Age: 59
End: 2024-05-09
Attending: UROLOGY
Payer: MEDICARE

## 2024-05-09 ENCOUNTER — HOSPITAL ENCOUNTER (OUTPATIENT)
Dept: RADIOLOGY | Facility: HOSPITAL | Age: 59
Discharge: HOME | End: 2024-05-09
Attending: UROLOGY
Payer: MEDICARE

## 2024-05-09 VITALS
HEIGHT: 66 IN | DIASTOLIC BLOOD PRESSURE: 91 MMHG | RESPIRATION RATE: 18 BRPM | TEMPERATURE: 98 F | HEART RATE: 85 BPM | SYSTOLIC BLOOD PRESSURE: 128 MMHG | WEIGHT: 190.5 LBS | BODY MASS INDEX: 30.62 KG/M2

## 2024-05-09 DIAGNOSIS — R33.9 INCOMPLETE BLADDER EMPTYING: ICD-10-CM

## 2024-05-09 DIAGNOSIS — N39.0 RECURRENT UTI: ICD-10-CM

## 2024-05-09 DIAGNOSIS — N31.9 BLADDER DYSFUNCTION: ICD-10-CM

## 2024-05-09 PROCEDURE — 51728 CYSTOMETROGRAM W/VP: CPT | Mod: PBBFAC | Performed by: UROLOGY

## 2024-05-09 PROCEDURE — 52000 CYSTOURETHROSCOPY: CPT | Mod: PBBFAC | Performed by: UROLOGY

## 2024-05-09 PROCEDURE — 51784 ANAL/URINARY MUSCLE STUDY: CPT | Mod: PBBFAC | Performed by: UROLOGY

## 2024-05-09 PROCEDURE — 51727 CYSTOMETROGRAM W/UP: CPT | Mod: PBBFAC | Performed by: UROLOGY

## 2024-05-09 PROCEDURE — 99999PBSHW PR PBB SHADOW TECHNICAL ONLY FILED TO HB: Mod: PBBFAC,,,

## 2024-05-09 PROCEDURE — 51784 ANAL/URINARY MUSCLE STUDY: CPT | Mod: 26,S$PBB,51, | Performed by: UROLOGY

## 2024-05-09 PROCEDURE — 74430 CONTRAST X-RAY BLADDER: CPT | Mod: TC

## 2024-05-09 PROCEDURE — 51797 INTRAABDOMINAL PRESSURE TEST: CPT | Mod: PBBFAC | Performed by: UROLOGY

## 2024-05-09 PROCEDURE — 51600 INJECTION FOR BLADDER X-RAY: CPT | Mod: PBBFAC | Performed by: UROLOGY

## 2024-05-09 PROCEDURE — 51600 INJECTION FOR BLADDER X-RAY: CPT | Mod: S$PBB,51,, | Performed by: UROLOGY

## 2024-05-09 PROCEDURE — 51727 CYSTOMETROGRAM W/UP: CPT | Mod: 26,S$PBB,, | Performed by: UROLOGY

## 2024-05-09 PROCEDURE — 52000 CYSTOURETHROSCOPY: CPT | Mod: S$PBB,59,, | Performed by: UROLOGY

## 2024-05-09 PROCEDURE — 74430 CONTRAST X-RAY BLADDER: CPT | Mod: 26,S$PBB,, | Performed by: UROLOGY

## 2024-05-09 RX ORDER — LIDOCAINE HYDROCHLORIDE 20 MG/ML
JELLY TOPICAL ONCE
Status: COMPLETED | OUTPATIENT
Start: 2024-05-09 | End: 2024-05-09

## 2024-05-09 RX ORDER — DOXYCYCLINE HYCLATE 100 MG
100 TABLET ORAL ONCE
Status: COMPLETED | OUTPATIENT
Start: 2024-05-09 | End: 2024-05-09

## 2024-05-09 RX ADMIN — IOHEXOL 100 ML: 350 INJECTION, SOLUTION INTRAVENOUS at 02:05

## 2024-05-09 RX ADMIN — Medication 100 MG: at 03:05

## 2024-05-09 RX ADMIN — LIDOCAINE HYDROCHLORIDE: 20 JELLY TOPICAL at 03:05

## 2024-05-09 NOTE — PATIENT INSTRUCTIONS
SIMPLE URODYNAMIC STUDY (SUDS) CYSTOSCOPY  FLUORO URODYNAMIC STUDY (FUDS) CYSTOSCOPY  DISCHARGE INSTRUCTIONS    You have had a procedure that will require time to properly heal. Follow the instructions you have been given on how to care for yourself once you are home. Below is additional information to help in your recovery.    ACTIVITY  There are no restrictions in activity. Start doing again the things you did before the procedure.  You may experience a slight burning sensation and notice a small amount of blood in your urine after your procedure. This will clear up within a day. Call the clinic if it continues beyond 48 hours.  If you are sent home with a catheter in place, only take showers until the catheter is removed.    DIET  Continue your normal diet. You may eat the same foods you ate before your procedure.  Drink plenty of fluids during the first 24 to 48 hours following your procedure.    MEDICATIONS  Resume all other previous medications from your prescribing physician.  Continue any pre-procedure antibiotics until they are all gone.    SIGNS AND SYMPTOMS TO REPORT TO THE DOCTOR  Chills or fever greater than 101° F within 24 hours of procedure.  Changes in urination, such as increased bleeding, foul smell, cloudy urine, or painful urination.  Call your doctor with any questions or concerns.    For any emergency situation, call 911 immediately or go to your nearest emergency room.    [facility]  [phone]

## 2024-05-13 ENCOUNTER — PATIENT MESSAGE (OUTPATIENT)
Dept: UROLOGY | Facility: CLINIC | Age: 59
End: 2024-05-13
Payer: MEDICARE

## 2024-05-13 ENCOUNTER — PATIENT MESSAGE (OUTPATIENT)
Dept: NEUROLOGY | Facility: CLINIC | Age: 59
End: 2024-05-13
Payer: MEDICARE

## 2024-05-14 NOTE — PROCEDURES
Procedures    Fluoro Urodynamic Report    Indication:   urinary retention in a patient with MSA    Patient was taken to the Urodynamic Suite with a comfortably full bladder and asked to perform a free uroflow.  Next, the patient was prepped and the urinary residual was drained with a 14 Fr catheter.  A 7 Fr dual lumen catheter was placed to measure intravesical pressures.  A 10 Fr balloon manometer was placed into the rectum for abdominal pressure measurements.  Patch EMG electrodes were placed on the perineum.  The patient was connected to the popexpert Urodynamic machine, using a multichannel technique.  The bladder was filled with Cysto ConRay at room temperature at a rate of 30 ml/min.  Patient is filled to urgency.  Filling is performed with the patient in the seated position.  Abdominal leak point pressures are checked at 1st desire, then serially at 50cc increments first with Valsalva then with coughing.  The patient was then asked to sit and void for a pressure flow study.    The following are the results of the study:  1.  Uroflow       Q max:  unable to void for the uroflow    2.  Amount in the bladder at the start of the test:  135 ml      3.  CMG       Sensation:         First Desire:           Normal Desire:           Strong Desire:         Urgency:       Capacity:       Abnormal Contractions:  had DO at 223 ml infused, and a few more then at 344 ml infused she was allowed to void.  Emptied 90 ml after the first DO       Compliance:  17.5 ml/cm H2O    4.  Abdominal Leak Point Pressure:  no stress incontinence    5.  EMG:  increased during filling, decreased when checking for stress incontinence    6.  Voiding phase       Q max:  4.2 ml/sec       P det at Q max:  23  cm H2O       Pattern of the curve:  intermittent       Voided volume:  39 ml       PVR:  240 ml    7.  Fluoroscopy--I have personally interpreted the images and the results are as follows:  bladder was smooth, bladder neck was not open at  rest (status post sling) had DO but was unsustained enough to empty    8.  Analysis:  detrusor overactivity with DO    9.  Recommendations:       A.  Learn CIC.  She was provided with catheters and cards with QR codes for pt education.  Recommend that she do this twice a day.         b.  Can bring her back for education with the nurses.              CYSTOSCOPY REPORT    Pre Procedure Diagnosis:  urinary retention in a patient with MSA    Post Procedure Diagnosis:  normal lower urinary tract    Anesthesia: 10 cc 2% lidocaine jelly applied per urethra.    14 FR Flexible Olympus cystoscope used.    FINDINGS:  Dome, anterior, posterior, lateral walls and bladder base free of urothelial abnormalities. Right and left ureteral orifices in the normal postion and configuration, both effluxed clear urine.  Bladder neck and urethra were normal.    Specimen:  none    The patient was taken to the cystoscopy suite and placed in dorsal lithotomy position.  The genitalia was prepped and draped  in the usual sterile fashion.  Time out was performed.  Two percent lidocaine jelly was inserted in the urethra.  After sufficent time had passed to allow good local anesthesia, the cystoscope was inserted in the urethra and passed into the bladder visualizing the urethra along its entire course.  The dome, anterior, posterior and lateral walls were examined systematically.  The ureteral orifices were in their usual position and configuration.  The cystoscope was turned upon itself 180 degrees to visualize the bladder neck.  The cystoscope was then brought to the level of the bladder neck, the water was turned on and the urethra was visualized.  The cystoscope was removed and the patient was instructed to urinate prior to leaving the office.     Post procedure medication:  doxycycline 100 mg x 1    ADDITIONAL NOTES:  renal ultrasound on 5/1/2024 showed normal kidneys     ASSESSMENT/PLAN:  58 year old woman status post flexible  cystoscopy.  1. Push fluids for 24 hours.  2. May see blood in the urine, this should gradually improve over the next 2-3 days.  3. The patient was instructed to return to the office or go to the emergency should fever, chills, cloudy urine, or inability to urinate develop.  4. Follow up as above.

## 2024-05-17 ENCOUNTER — PATIENT MESSAGE (OUTPATIENT)
Dept: UROLOGY | Facility: CLINIC | Age: 59
End: 2024-05-17
Payer: MEDICARE

## 2024-05-19 DIAGNOSIS — N39.41 URGE INCONTINENCE: ICD-10-CM

## 2024-05-19 DIAGNOSIS — R39.15 URINARY URGENCY: ICD-10-CM

## 2024-05-21 ENCOUNTER — PATIENT MESSAGE (OUTPATIENT)
Dept: UROLOGY | Facility: CLINIC | Age: 59
End: 2024-05-21
Payer: MEDICARE

## 2024-05-22 ENCOUNTER — PATIENT MESSAGE (OUTPATIENT)
Dept: NEUROLOGY | Facility: CLINIC | Age: 59
End: 2024-05-22
Payer: MEDICARE

## 2024-05-22 RX ORDER — HYOSCYAMINE SULFATE 0.125 MG
TABLET ORAL
Qty: 360 TABLET | Refills: 3 | Status: SHIPPED | OUTPATIENT
Start: 2024-05-22

## 2024-05-30 ENCOUNTER — PATIENT MESSAGE (OUTPATIENT)
Dept: NEUROLOGY | Facility: CLINIC | Age: 59
End: 2024-05-30
Payer: MEDICARE

## 2024-05-30 DIAGNOSIS — J98.4 RESTRICTIVE LUNG MECHANICS DUE TO NEUROMUSCULAR DISEASE: ICD-10-CM

## 2024-05-30 DIAGNOSIS — G90.3 MULTIPLE SYSTEM ATROPHY: ICD-10-CM

## 2024-05-30 DIAGNOSIS — I95.1 ORTHOSTASIS: ICD-10-CM

## 2024-05-30 DIAGNOSIS — G23.8 MULTIPLE SYSTEM ATROPHY: ICD-10-CM

## 2024-05-30 DIAGNOSIS — G23.8 MULTIPLE SYSTEM ATROPHY: Primary | ICD-10-CM

## 2024-05-30 DIAGNOSIS — G70.9 RESTRICTIVE LUNG MECHANICS DUE TO NEUROMUSCULAR DISEASE: ICD-10-CM

## 2024-05-30 DIAGNOSIS — G90.3 MULTIPLE SYSTEM ATROPHY: Primary | ICD-10-CM

## 2024-05-30 RX ORDER — MIDODRINE HYDROCHLORIDE 10 MG/1
10 TABLET ORAL
Qty: 90 TABLET | Refills: 0 | Status: SHIPPED | OUTPATIENT
Start: 2024-05-30 | End: 2024-06-04 | Stop reason: SDUPTHER

## 2024-06-04 RX ORDER — MIDODRINE HYDROCHLORIDE 10 MG/1
10 TABLET ORAL
Qty: 90 TABLET | Refills: 0 | Status: SHIPPED | OUTPATIENT
Start: 2024-06-04

## 2024-06-04 NOTE — TELEPHONE ENCOUNTER
Called in refill to requested pharmacy for midodrine 10 mg po tid with meals.  Left message on pharmacy's voicemail and informed pt via [employer]

## 2024-06-10 ENCOUNTER — PATIENT MESSAGE (OUTPATIENT)
Dept: NEUROLOGY | Facility: CLINIC | Age: 59
End: 2024-06-10
Payer: MEDICARE

## 2024-06-11 ENCOUNTER — PATIENT MESSAGE (OUTPATIENT)
Dept: UROLOGY | Facility: CLINIC | Age: 59
End: 2024-06-11
Payer: MEDICARE

## 2024-06-11 NOTE — TELEPHONE ENCOUNTER
Pt reports she has been to urologist and is self catheterizing 2-3 times per day.  She states she wakes up soaked. Sent [employer] message with information about different types of catheters, fluid timing and bladder irritants.  Routed to Dr. Polo to advise.

## 2024-06-25 ENCOUNTER — PATIENT MESSAGE (OUTPATIENT)
Dept: UROLOGY | Facility: CLINIC | Age: 59
End: 2024-06-25
Payer: MEDICARE

## 2024-06-25 ENCOUNTER — PATIENT MESSAGE (OUTPATIENT)
Dept: NEUROLOGY | Facility: CLINIC | Age: 59
End: 2024-06-25
Payer: MEDICARE

## 2024-06-25 DIAGNOSIS — R06.02 SHORTNESS OF BREATH: Primary | ICD-10-CM

## 2024-06-25 DIAGNOSIS — G90.3 MULTIPLE SYSTEM ATROPHY: ICD-10-CM

## 2024-06-25 DIAGNOSIS — I95.1 ORTHOSTASIS: ICD-10-CM

## 2024-06-25 DIAGNOSIS — G23.8 MULTIPLE SYSTEM ATROPHY: ICD-10-CM

## 2024-06-27 RX ORDER — MIDODRINE HYDROCHLORIDE 10 MG/1
10 TABLET ORAL
Qty: 90 TABLET | Refills: 0 | Status: SHIPPED | OUTPATIENT
Start: 2024-06-27

## 2024-06-27 RX ORDER — FLUDROCORTISONE ACETATE 0.1 MG/1
200 TABLET ORAL DAILY
Qty: 180 TABLET | Refills: 2 | Status: SHIPPED | OUTPATIENT
Start: 2024-06-27 | End: 2025-03-24

## 2024-06-27 NOTE — TELEPHONE ENCOUNTER
Pt requested refills of Florinef 0.1 mg tab  take 2 tabs daily for shortness of breath.    Renewal pended.

## 2024-07-02 ENCOUNTER — PATIENT MESSAGE (OUTPATIENT)
Dept: NEUROLOGY | Facility: CLINIC | Age: 59
End: 2024-07-02
Payer: MEDICARE

## 2024-07-03 ENCOUNTER — PATIENT MESSAGE (OUTPATIENT)
Dept: UROLOGY | Facility: CLINIC | Age: 59
End: 2024-07-03
Payer: MEDICARE

## 2024-07-03 ENCOUNTER — DOCUMENTATION ONLY (OUTPATIENT)
Dept: NEUROLOGY | Facility: CLINIC | Age: 59
End: 2024-07-03
Payer: MEDICARE

## 2024-07-03 NOTE — PROGRESS NOTES
Called pt, spouse, no answer  Left VM to increase midodrine to 15mg TID if Bps do not exceed 160's systolic sustained  TO ER if worsening  Left tip sheet in mychart re: bp management tools

## 2024-07-03 NOTE — TELEPHONE ENCOUNTER
"Called pt. Speech is somewhat garbled and difficult to understand.   She reports she was trying cut back on drinks. Her urologist wants to do a suprapubic catheter.  Advised this may be a good idea and enable her to stay more hydrated.  Vision is blurry.  Hx double vision  Fell one day last week.  "I've been more skittish since then. Knot on my head. Lost my balance. No nausea, no vomiting, no pain in head. Lump is just about gone. "     Has an MRI scheduled July 16.    "Last night I took a klonopin at bedtime. Left toes hurt and burn.   Doesn't really help burning but helped sleep.  I slept about 4 hours."     "Yesterday I drank some broth.  I started feeling better.  I was just sitting around."    Used the walker yesterday, but mostly using wheelchair. I don't have the strength to get up.  Had a CPAP machine    Breathing has a high pitched sound when I'm stressed. BP staying 60/40.  I couldn't get my blood pressure up.    Hasn't checked BP today. "I Just check it when I get dark or light spots or feel really dizzy."      Pt messaged yesterday regarding current meds.  Routed to MD to advise.      "

## 2024-07-05 NOTE — TELEPHONE ENCOUNTER
Placed call to patient to check status. Last 2 days have been rough. Had a fall yesterday in the bathroom. Denies injury.   Patient says she is doing better today. Speech is slurred.  She is drinking 3 bottles of water daily. She stops her fluid intake 4-5PM daily.  She is having urine incontinence at night.   She is troubled by this. Awaiting possible suprapubic catheter placement.   She takes the last midodrine around 8PM.  Offered and booked VV for 8/5/24 at 4PM.

## 2024-07-21 DIAGNOSIS — G90.3 MULTIPLE SYSTEM ATROPHY: ICD-10-CM

## 2024-07-21 DIAGNOSIS — G23.8 MULTIPLE SYSTEM ATROPHY: ICD-10-CM

## 2024-07-21 DIAGNOSIS — I95.1 ORTHOSTASIS: ICD-10-CM

## 2024-07-22 RX ORDER — MIDODRINE HYDROCHLORIDE 10 MG/1
10 TABLET ORAL
Qty: 90 TABLET | Refills: 0 | Status: SHIPPED | OUTPATIENT
Start: 2024-07-22

## 2024-07-25 ENCOUNTER — OFFICE VISIT (OUTPATIENT)
Dept: UROLOGY | Facility: CLINIC | Age: 59
End: 2024-07-25
Payer: MEDICARE

## 2024-07-25 VITALS
DIASTOLIC BLOOD PRESSURE: 63 MMHG | BODY MASS INDEX: 29.82 KG/M2 | SYSTOLIC BLOOD PRESSURE: 91 MMHG | HEART RATE: 88 BPM | WEIGHT: 184.75 LBS

## 2024-07-25 DIAGNOSIS — N31.9 NEUROGENIC BLADDER: Primary | ICD-10-CM

## 2024-07-25 DIAGNOSIS — N39.0 RECURRENT UTI: ICD-10-CM

## 2024-07-25 PROCEDURE — 87186 SC STD MICRODIL/AGAR DIL: CPT | Performed by: UROLOGY

## 2024-07-25 PROCEDURE — 87086 URINE CULTURE/COLONY COUNT: CPT | Performed by: UROLOGY

## 2024-07-25 PROCEDURE — 99212 OFFICE O/P EST SF 10 MIN: CPT | Mod: PBBFAC | Performed by: UROLOGY

## 2024-07-25 PROCEDURE — 87088 URINE BACTERIA CULTURE: CPT | Performed by: UROLOGY

## 2024-07-25 PROCEDURE — 99999 PR PBB SHADOW E&M-EST. PATIENT-LVL II: CPT | Mod: PBBFAC,,, | Performed by: UROLOGY

## 2024-07-25 PROCEDURE — 51701 INSERT BLADDER CATHETER: CPT | Mod: PBBFAC | Performed by: UROLOGY

## 2024-07-25 NOTE — PROGRESS NOTES
CHIEF COMPLAINT:    Mrs. Kirkpatrick is a 58 y.o. female presenting for a discussion about suprapubic tube for incomplete bladder emptying.      PRESENTING ILLNESS:    Nirali Kirkpatrick is a 58 y.o. female who presents with a history of MSA.  She has incomplete bladder emptying but has had difficulty trying to self catheterize because she had a tremor, often misses, drops the catheter before the bladder is drained.  We have been messaging about a suprapubic tube.  She feels like she may have a bladder infection.  She still has nocturia even if she self catheterizes at night.    She is on an experimental medication that precludes the use of an inhaled anesthetic.      REVIEW OF SYSTEMS:    Review of Systems   Constitutional: Negative.    HENT: Negative.     Eyes: Negative.    Respiratory: Negative.     Cardiovascular: Negative.    Gastrointestinal: Negative.    Genitourinary:  Positive for urgency.   Musculoskeletal:         Uses a wheel chair   Skin: Negative.    Neurological:  Positive for weakness.   Endo/Heme/Allergies: Negative.    Psychiatric/Behavioral: Negative.         PATIENT HISTORY:    Past Medical History:   Diagnosis Date    Depression     Hypertension        Past Surgical History:   Procedure Laterality Date    CHOLECYSTECTOMY      CYSTOSCOPY N/A 6/15/2021    Procedure: CYSTOSCOPY;  Surgeon: Susan Mcgee MD;  Location: [address];  Service: Urology;  Laterality: N/A;    CYSTOSCOPY N/A 7/13/2021    Procedure: CYSTOSCOPY;  Surgeon: Susan Mcgee MD;  Location: [address];  Service: Urology;  Laterality: N/A;    FLUOROSCOPIC URODYNAMIC STUDY N/A 6/15/2021    Procedure: URODYNAMIC STUDY, FLUOROSCOPIC;  Surgeon: Susan Mcgee MD;  Location: [address];  Service: Urology;  Laterality: N/A;  90 MINUTES     Gastric sleeve  2013    INSERTION OF MIDURETHRAL SLING N/A 7/13/2021    Procedure: SLING, MIDURETHRAL;  Surgeon: Susan Mcgee MD;  Location: [address];  Service: Urology;   Laterality: N/A;  1 HOUR     tubligation  2004       Family History   Problem Relation Name Age of Onset    Stroke Mother      Hypertension Mother      Heart disease Mother      Heart disease Father      Lung disease Father      Hyperlipidemia Father      Hypertension Father         Social History     Socioeconomic History    Marital status:    Tobacco Use    Smoking status: Never    Smokeless tobacco: Never   Substance and Sexual Activity    Alcohol use: Not Currently    Drug use: Never   Social History Narrative    ** Merged History Encounter **          Social Determinants of Health     Financial Resource Strain: Medium Risk (4/22/2024)    Overall Financial Resource Strain (CARDIA)     Difficulty of Paying Living Expenses: Somewhat hard   Food Insecurity: Food Insecurity Present (4/22/2024)    Hunger Vital Sign     Worried About Running Out of Food in the Last Year: Sometimes true     Ran Out of Food in the Last Year: Sometimes true   Transportation Needs: Unknown (4/22/2024)    PRAPARE - Transportation     Lack of Transportation (Non-Medical): No   Physical Activity: Unknown (4/22/2024)    Exercise Vital Sign     Days of Exercise per Week: 0 days   Stress: Stress Concern Present (4/22/2024)    [employer] [place] [employer] - Occupational Stress Questionnaire     Feeling of Stress : Rather much   Housing Stability: Unknown (4/22/2024)    Housing Stability Vital Sign     Unable to Pay for Housing in the Last Year: No       Allergies:  Patient has no known allergies.    Medications:  Outpatient Encounter Medications as of 7/25/2024   Medication Sig Dispense Refill    b complex vitamins tablet Take 1 tablet by mouth once daily.      clonazePAM (KLONOPIN) 0.5 MG tablet TAKE 1 TABLET TWICE A DAY  AS NEEDED FOR ANXIETY 30 tablet 0    cyanocobalamin 1,000 mcg/mL injection 1,000 mcg every 30 days.       desvenlafaxine succinate (PRISTIQ) 100 MG Tb24 100 mg every evening.       fludrocortisone (FLORINEF)  0.1 mg Tab Take 2 tablets (200 mcg total) by mouth once daily. 180 tablet 2    hyoscyamine (ANASPAZ,LEVSIN) 0.125 mg Tab TAKE 1 TABLET BY MOUTH EVERY 6 HOURS AS NEEDED FOR URGENCY, URGE INCONTINENCE 360 tablet 3    midodrine (PROAMATINE) 10 MG tablet TAKE 1 TABLET BY MOUTH THREE TIMES DAILY WITH MEALS 90 tablet 0            No facility-administered encounter medications on file as of 7/25/2024.         PHYSICAL EXAMINATION:    The patient generally appears in good health, is appropriately interactive, and is in no apparent distress.    Skin: No lesions.    Mental: Cooperative with normal affect.    Neuro: Grossly intact.    HEENT: Normal. No evidence of lymphadenopathy.    Chest:  normal inspiratory effort.    Abdomen: Soft, non-tender. No masses or organomegaly. Bladder is not palpable. No evidence of flank discomfort. No evidence of inguinal hernia.    Extremities: No clubbing, cyanosis, or edema.  Has lower extremity spasticity    NOTE:  the exam was carried out with a nurse chaperone present  Normal external female genitalia  Urethral meatus is normal  Urethra and bladder are nontender to bimanual exam  Well supported anteriorly and posteriorly   Uterus and cervix are surgically absent  No adnexal masses  Random bladder volume is 30 ml (she catheterized before she came to the appointment)     LABS:    Lab Results   Component Value Date    BUN 20 07/14/2021    CREATININE 0.7 07/14/2021     IMPRESSION:    Incomplete bladder emptying   MSA    PLAN:    1.  Either she can get a percutaneous suprapubic tube through interventional radiology or, she could wait until she has completed the experimental therapy and have an open suprapubic tube under general anesthesia.  2.  The catheterized specimen was sent for culture  3.  She will think about it and let me know if she wants the suprapubic tube with IR or wait until [place] for an open suprapubic tube.      I spent 30 minutes with the patient of which more than half was  spent in direct consultation with the patient in regards to our treatment and plan.

## 2024-07-27 LAB — BACTERIA UR CULT: ABNORMAL

## 2024-07-29 ENCOUNTER — PATIENT MESSAGE (OUTPATIENT)
Dept: UROLOGY | Facility: CLINIC | Age: 59
End: 2024-07-29
Payer: MEDICARE

## 2024-07-29 ENCOUNTER — TELEPHONE (OUTPATIENT)
Dept: UROLOGY | Facility: CLINIC | Age: 59
End: 2024-07-29
Payer: MEDICARE

## 2024-07-29 DIAGNOSIS — N30.90 BLADDER INFECTION: Primary | ICD-10-CM

## 2024-07-29 DIAGNOSIS — N31.9 NEUROGENIC BLADDER: Primary | ICD-10-CM

## 2024-07-29 DIAGNOSIS — R33.9 INCOMPLETE BLADDER EMPTYING: ICD-10-CM

## 2024-07-29 RX ORDER — SULFAMETHOXAZOLE AND TRIMETHOPRIM 800; 160 MG/1; MG/1
1 TABLET ORAL 2 TIMES DAILY
Qty: 14 TABLET | Refills: 0 | Status: SHIPPED | OUTPATIENT
Start: 2024-07-29 | End: 2024-08-05

## 2024-07-29 NOTE — TELEPHONE ENCOUNTER
Positive urine culture Kluyvera ascorbata, pan sensitive.  Bactrim DS sent to her local [facility]

## 2024-07-31 NOTE — TELEPHONE ENCOUNTER
Ambulatory consult and order done for suprapubic tube placement in IR.    Thalidomide Counseling: I discussed with the patient the risks of thalidomide including but not limited to birth defects, anxiety, weakness, chest pain, dizziness, cough and severe allergy.

## 2024-08-05 ENCOUNTER — OFFICE VISIT (OUTPATIENT)
Dept: NEUROLOGY | Facility: CLINIC | Age: 59
End: 2024-08-05
Payer: MEDICARE

## 2024-08-05 DIAGNOSIS — G23.8 MULTIPLE SYSTEM ATROPHY: ICD-10-CM

## 2024-08-05 DIAGNOSIS — I95.1 ORTHOSTASIS: ICD-10-CM

## 2024-08-05 DIAGNOSIS — G90.3 MULTIPLE SYSTEM ATROPHY: ICD-10-CM

## 2024-08-05 PROCEDURE — 99215 OFFICE O/P EST HI 40 MIN: CPT | Mod: 95,,, | Performed by: PSYCHIATRY & NEUROLOGY

## 2024-08-05 RX ORDER — MIDODRINE HYDROCHLORIDE 10 MG/1
15 TABLET ORAL
Qty: 90 TABLET | Refills: 0 | Status: SHIPPED | OUTPATIENT
Start: 2024-08-05

## 2024-08-09 ENCOUNTER — PATIENT MESSAGE (OUTPATIENT)
Dept: UROLOGY | Facility: CLINIC | Age: 59
End: 2024-08-09
Payer: MEDICARE

## 2024-08-17 DIAGNOSIS — I95.1 ORTHOSTASIS: ICD-10-CM

## 2024-08-17 DIAGNOSIS — G90.3 MULTIPLE SYSTEM ATROPHY: ICD-10-CM

## 2024-08-17 DIAGNOSIS — G23.8 MULTIPLE SYSTEM ATROPHY: ICD-10-CM

## 2024-08-20 RX ORDER — MIDODRINE HYDROCHLORIDE 10 MG/1
15 TABLET ORAL
Qty: 90 TABLET | Refills: 3 | Status: SHIPPED | OUTPATIENT
Start: 2024-08-20

## 2024-08-28 DIAGNOSIS — R33.9 INCOMPLETE BLADDER EMPTYING: Primary | ICD-10-CM

## 2024-08-28 DIAGNOSIS — N31.9 NEUROGENIC BLADDER: ICD-10-CM

## 2024-08-30 ENCOUNTER — PATIENT MESSAGE (OUTPATIENT)
Dept: UROLOGY | Facility: CLINIC | Age: 59
End: 2024-08-30
Payer: MEDICARE

## 2024-08-30 ENCOUNTER — TELEPHONE (OUTPATIENT)
Dept: INTERVENTIONAL RADIOLOGY/VASCULAR | Facility: CLINIC | Age: 59
End: 2024-08-30
Payer: MEDICARE

## 2024-08-31 ENCOUNTER — TELEPHONE (OUTPATIENT)
Dept: INTERVENTIONAL RADIOLOGY/VASCULAR | Facility: CLINIC | Age: 59
End: 2024-08-31
Payer: MEDICARE

## 2024-09-05 ENCOUNTER — PATIENT MESSAGE (OUTPATIENT)
Dept: INTERVENTIONAL RADIOLOGY/VASCULAR | Facility: HOSPITAL | Age: 59
End: 2024-09-05
Payer: MEDICARE

## 2024-09-05 ENCOUNTER — TELEPHONE (OUTPATIENT)
Dept: UROLOGY | Facility: CLINIC | Age: 59
End: 2024-09-05
Payer: MEDICARE

## 2024-09-05 NOTE — NURSING
Pre-procedure call complete.  2 patient identifier used (name and ).  Pt instructed not to eat or drink anything after midnight the night before procedure.  Pt aware will need someone to provide transport home and monitor pt 8 hours post procedure.  No driving for at least 24 hours after procedure.   Patient advised to take blood pressure, heart medications,  with a sip of water morning of procedure.  Patient verbalized aware of which medications to take.  Do not take  sleep medication (including OTC) and anxiety medication the night before procedure.  Arrival time and location given.  Expected length of stay reviewed.  Covid screening completed.  Pt verbalized understanding of all pre-procedure instructions.  Written instructions and directions sent to patient in [employer]/portal.

## 2024-09-05 NOTE — TELEPHONE ENCOUNTER
----- Message from Susan Mcgee MD sent at 8/23/2024  4:05 PM CDT -----  Needs follow up for the suprapubic tube in [phone]   Left message on the number above on 3/23/2024

## 2024-09-06 ENCOUNTER — HOSPITAL ENCOUNTER (OUTPATIENT)
Dept: INTERVENTIONAL RADIOLOGY/VASCULAR | Facility: HOSPITAL | Age: 59
Discharge: HOME | End: 2024-09-06
Attending: UROLOGY
Payer: MEDICARE

## 2024-09-06 VITALS
BODY MASS INDEX: 31.18 KG/M2 | HEIGHT: 66 IN | RESPIRATION RATE: 18 BRPM | OXYGEN SATURATION: 98 % | WEIGHT: 194 LBS | DIASTOLIC BLOOD PRESSURE: 93 MMHG | HEART RATE: 79 BPM | TEMPERATURE: 98 F | SYSTOLIC BLOOD PRESSURE: 167 MMHG

## 2024-09-06 DIAGNOSIS — R33.9 INCOMPLETE BLADDER EMPTYING: ICD-10-CM

## 2024-09-06 DIAGNOSIS — N31.9 NEUROGENIC BLADDER: ICD-10-CM

## 2024-09-06 LAB
BASOPHILS # BLD AUTO: 0.04 K/UL (ref 0–0.2)
BASOPHILS NFR BLD: 0.5 % (ref 0–1.9)
DIFFERENTIAL METHOD BLD: ABNORMAL
EOSINOPHIL # BLD AUTO: 0 K/UL (ref 0–0.5)
EOSINOPHIL NFR BLD: 0.5 % (ref 0–8)
ERYTHROCYTE [DISTWIDTH] IN BLOOD BY AUTOMATED COUNT: 13 % (ref 11.5–14.5)
HCT VFR BLD AUTO: 34.3 % (ref 37–48.5)
HGB BLD-MCNC: 11.7 G/DL (ref 12–16)
IMM GRANULOCYTES # BLD AUTO: 0.03 K/UL (ref 0–0.04)
IMM GRANULOCYTES NFR BLD AUTO: 0.4 % (ref 0–0.5)
INR PPP: 0.9 (ref 0.8–1.2)
LYMPHOCYTES # BLD AUTO: 1.3 K/UL (ref 1–4.8)
LYMPHOCYTES NFR BLD: 16.4 % (ref 18–48)
MCH RBC QN AUTO: 30.7 PG (ref 27–31)
MCHC RBC AUTO-ENTMCNC: 34.1 G/DL (ref 32–36)
MCV RBC AUTO: 90 FL (ref 82–98)
MONOCYTES # BLD AUTO: 0.5 K/UL (ref 0.3–1)
MONOCYTES NFR BLD: 6 % (ref 4–15)
NEUTROPHILS # BLD AUTO: 5.8 K/UL (ref 1.8–7.7)
NEUTROPHILS NFR BLD: 76.2 % (ref 38–73)
NRBC BLD-RTO: 0 /100 WBC
PLATELET # BLD AUTO: 211 K/UL (ref 150–450)
PMV BLD AUTO: 10.2 FL (ref 9.2–12.9)
PROTHROMBIN TIME: 10.3 SEC (ref 9–12.5)
RBC # BLD AUTO: 3.81 M/UL (ref 4–5.4)
WBC # BLD AUTO: 7.66 K/UL (ref 3.9–12.7)

## 2024-09-06 PROCEDURE — C1894 INTRO/SHEATH, NON-LASER: HCPCS

## 2024-09-06 PROCEDURE — C1769 GUIDE WIRE: HCPCS

## 2024-09-06 PROCEDURE — 99152 MOD SED SAME PHYS/QHP 5/>YRS: CPT | Performed by: RADIOLOGY

## 2024-09-06 PROCEDURE — 63600175 PHARM REV CODE 636 W HCPCS

## 2024-09-06 PROCEDURE — 51102 DRAIN BL W/CATH INSERTION: CPT | Performed by: RADIOLOGY

## 2024-09-06 PROCEDURE — 76942 ECHO GUIDE FOR BIOPSY: CPT | Mod: TC | Performed by: RADIOLOGY

## 2024-09-06 PROCEDURE — 25000003 PHARM REV CODE 250

## 2024-09-06 PROCEDURE — 63600175 PHARM REV CODE 636 W HCPCS: Performed by: RADIOLOGY

## 2024-09-06 PROCEDURE — 99152 MOD SED SAME PHYS/QHP 5/>YRS: CPT | Mod: ,,, | Performed by: RADIOLOGY

## 2024-09-06 PROCEDURE — 85025 COMPLETE CBC W/AUTO DIFF WBC: CPT

## 2024-09-06 PROCEDURE — 85610 PROTHROMBIN TIME: CPT

## 2024-09-06 PROCEDURE — 76942 ECHO GUIDE FOR BIOPSY: CPT | Mod: 26,,, | Performed by: RADIOLOGY

## 2024-09-06 PROCEDURE — 25000003 PHARM REV CODE 250: Performed by: RADIOLOGY

## 2024-09-06 RX ORDER — FENTANYL CITRATE 50 UG/ML
INJECTION, SOLUTION INTRAMUSCULAR; INTRAVENOUS
Status: COMPLETED | OUTPATIENT
Start: 2024-09-06 | End: 2024-09-06

## 2024-09-06 RX ORDER — LIDOCAINE HYDROCHLORIDE 20 MG/ML
INJECTION, SOLUTION INFILTRATION; PERINEURAL
Status: COMPLETED | OUTPATIENT
Start: 2024-09-06 | End: 2024-09-06

## 2024-09-06 RX ORDER — MIDAZOLAM HYDROCHLORIDE 1 MG/ML
2 INJECTION, SOLUTION INTRAMUSCULAR; INTRAVENOUS ONCE
Status: DISCONTINUED | OUTPATIENT
Start: 2024-09-06 | End: 2024-09-07 | Stop reason: HOSPADM

## 2024-09-06 RX ORDER — MIDAZOLAM HYDROCHLORIDE 1 MG/ML
INJECTION, SOLUTION INTRAMUSCULAR; INTRAVENOUS
Status: COMPLETED | OUTPATIENT
Start: 2024-09-06 | End: 2024-09-06

## 2024-09-06 RX ORDER — FENTANYL CITRATE 50 UG/ML
100 INJECTION, SOLUTION INTRAMUSCULAR; INTRAVENOUS ONCE
Status: DISCONTINUED | OUTPATIENT
Start: 2024-09-06 | End: 2024-09-07 | Stop reason: HOSPADM

## 2024-09-06 RX ADMIN — FENTANYL CITRATE 50 MCG: 50 INJECTION, SOLUTION INTRAMUSCULAR; INTRAVENOUS at 09:09

## 2024-09-06 RX ADMIN — CEFTRIAXONE SODIUM 1 G: 2 INJECTION, POWDER, FOR SOLUTION INTRAMUSCULAR; INTRAVENOUS at 09:09

## 2024-09-06 RX ADMIN — MIDAZOLAM HYDROCHLORIDE 1 MG: 2 INJECTION, SOLUTION INTRAMUSCULAR; INTRAVENOUS at 09:09

## 2024-09-06 RX ADMIN — LIDOCAINE HYDROCHLORIDE 5 ML: 20 INJECTION, SOLUTION INFILTRATION; PERINEURAL at 09:09

## 2024-09-06 NOTE — PLAN OF CARE
"B/P elevated. Pt is asymptomatic and states she usually runs low. Pt states "that's the reason I'm on the medication to keep my b/p up." Pt stated she took her Midodrine 15 mg this am as prescribed. IR notified  "

## 2024-09-06 NOTE — H&P
Radiology History & Physical      SUBJECTIVE:     Chief Complaint: abdominal pain this morning    History of Present Illness:    Nirali Kirkpatrick is a 58 y.o. female who presents for bladder aspiration and suprapubic catheter placement. Urology cannot place because patient is on experimental treatment that precludes the use of inhaled anesthetic. Thus, cannot be done with anesthesia.       Past Medical History:   Diagnosis Date    Depression     Hypertension      Past Surgical History:   Procedure Laterality Date    CHOLECYSTECTOMY      CYSTOSCOPY N/A 6/15/2021    Procedure: CYSTOSCOPY;  Surgeon: Susan Mcgee MD;  Location: [facility] OR [address] [place] [address];  Service: Urology;  Laterality: N/A;    CYSTOSCOPY N/A 7/13/2021    Procedure: CYSTOSCOPY;  Surgeon: Susan Mcgee MD;  Location: [facility] OR [address] [place] [address];  Service: Urology;  Laterality: N/A;    FLUOROSCOPIC URODYNAMIC STUDY N/A 6/15/2021    Procedure: URODYNAMIC STUDY, FLUOROSCOPIC;  Surgeon: Susan Mcgee MD;  Location: [facility] OR [address] [place] [address];  Service: Urology;  Laterality: N/A;  90 MINUTES     Gastric sleeve  2013    INSERTION OF MIDURETHRAL SLING N/A 7/13/2021    Procedure: SLING, MIDURETHRAL;  Surgeon: Susan Mcgee MD;  Location: [facility] OR [address] [place] [address];  Service: Urology;  Laterality: N/A;  1 HOUR     tubligation  2004       Home Meds:   Prior to Admission medications    Medication Sig Start Date End Date Taking? Authorizing Provider   clonazePAM (KLONOPIN) 0.5 MG tablet TAKE 1 TABLET TWICE A DAY  AS NEEDED FOR ANXIETY 11/28/23  Yes Ondina Polo MD   cyanocobalamin 1,000 mcg/mL injection 1,000 mcg every 30 days.  2/6/19  Yes Provider, Historical   fludrocortisone (FLORINEF) 0.1 mg Tab Take 2 tablets (200 mcg total) by mouth once daily. 6/27/24 3/24/25 Yes Ondina Polo MD   hyoscyamine (ANASPAZ,LEVSIN) 0.125 mg Tab TAKE 1 TABLET BY MOUTH EVERY 6 HOURS AS NEEDED FOR URGENCY, URGE INCONTINENCE 5/22/24  Yes Susan Mcgee MD   midodrine (PROAMATINE) 10 MG tablet  "Take 1.5 tablets (15 mg total) by mouth 3 (three) times daily with meals. 8/20/24  Yes Ondina Polo MD   b complex vitamins tablet Take 1 tablet by mouth once daily.    Provider, Historical   desvenlafaxine succinate (PRISTIQ) 100 MG Tb24 100 mg every evening.  4/1/19   Provider, Historical     Anticoagulants/Antiplatelets: no anticoagulation    Allergies: Review of patient's allergies indicates:  No Known Allergies  Sedation History:  no adverse reactions    Review of Systems:   Hematological: no known coagulopathies  Respiratory: no shortness of breath  Cardiovascular: no chest pain  Gastrointestinal: no abdominal pain  Genito-Urinary: no dysuria  Musculoskeletal: negative  Neurological: no TIA or stroke symptoms         OBJECTIVE:     Vital Signs (Most Recent)       Physical Exam:  ASA: 3  Mallampati: 3    General: no acute distress  Mental Status: alert and oriented to person, place and time  HEENT: normocephalic, atraumatic  Chest: unlabored breathing  Heart: regular heart rate  Abdomen: nondistended  Extremity: moves all extremities    Laboratory  No results found for: "INR", "PT", "PTT"    Lab Results   Component Value Date    WBC 6.01 07/14/2021    HGB 11.8 (L) 07/14/2021    HCT 35.7 (L) 07/14/2021    MCV 89 07/14/2021     (L) 07/14/2021      Lab Results   Component Value Date    GLU 94 07/14/2021     07/14/2021    K 4.6 07/14/2021     07/14/2021    CO2 17 (L) 07/14/2021    BUN 20 07/14/2021    CREATININE 0.7 07/14/2021    CALCIUM 9.4 07/14/2021    ALT 16 05/02/2019    AST 19 05/02/2019    ALBUMIN 3.4 (L) 12/16/2019    BILITOT 0.6 05/02/2019       ASSESSMENT/PLAN:     Sedation Plan: up to moderate  Patient will undergo fluoro guided bladder aspiration and suprapubic catheter placement.     Angel Salazar MD  Radiology PGY-2    "

## 2024-09-06 NOTE — PLAN OF CARE
Patient AAOx3, no distress noted, respirations even and unlabored, will continue to monitor. VSS. Acceptance of education, consents signed, H/P done. Labs reviewed. Patient in IR Room 188 for suprapubic catheter insertion procedure. Warm blankets applied to patient. Patient prepped and draped in sterile fashion.

## 2024-09-06 NOTE — CARE UPDATE
Pt states full understanding of discharge instructions and is fully recovered. Mendoza catheter removed per MD orders with 700cc clear yellow urine noted. Pt left with Family for garage via wheelchair.

## 2024-09-06 NOTE — PLAN OF CARE
Patient arrived to room. PIV placed, labs sent. Admit assessment completed. Plan of care discussed with patient. Sisters at bedside. Nurse call bell within reach. Will monitor

## 2024-09-06 NOTE — PLAN OF CARE
Suprapubic catheter placement  procedure completed. Patient tolerated well. Patient drowsy from sedation, no distress noted, respirations even and unlabored, will continue to monitor. VSS. Suprapubic catheter procedure site clean, dry, and intact; no bleeding or hematoma noted. Catheter sutured in place. Patient to have bladder drained via stevens catheter. Following bladder draining, stevens to be removed. Patient to be transferred to MPU for 1 hour for post-procedural recovery per MD. Report to be given at bedside to STAR Porter.

## 2024-09-06 NOTE — CARE UPDATE
Pt arrived to MPU 5 for recovery of a suprapubic cath placement. Pt to recover for 1hr then d/c home. B/P running elevated, patient took midodrine  this am. IR team aware and do not want to lower b/p at this time.See vs and assessment in the computer.

## 2024-09-06 NOTE — DISCHARGE INSTRUCTIONS
"Please call with any questions or concerns.    Monday through Friday 8:00 am - 4:30 pm  Interventional Radiology Clinic  [phone]    After hours/weekends  Ask the  for the "Interventional Radiology Physician on call"  [phone]      "

## 2024-09-08 ENCOUNTER — PATIENT MESSAGE (OUTPATIENT)
Dept: UROLOGY | Facility: CLINIC | Age: 59
End: 2024-09-08
Payer: MEDICARE

## 2024-09-10 NOTE — TELEPHONE ENCOUNTER
Called the patient.  Discussed that the only way I know how to make the connections work is to McGyver the two together.     Get some tubing from a hardware store that will fit over the drain spout from the bag and attach the overnight bag to it  Cut the hard plastic piece from the drainage bag off at an angle and try to get the spout from the drainage bag to fit into it.  Cutting it off straight will not allow the plastic to stretch.      Once the tube is changed to a normal stevens, the connections will be like before.  She has an appointment for catheter exchange already.

## 2024-09-26 ENCOUNTER — PATIENT MESSAGE (OUTPATIENT)
Dept: NEUROLOGY | Facility: CLINIC | Age: 59
End: 2024-09-26
Payer: MEDICARE

## 2024-09-29 ENCOUNTER — PATIENT MESSAGE (OUTPATIENT)
Dept: NEUROLOGY | Facility: CLINIC | Age: 59
End: 2024-09-29
Payer: MEDICARE

## 2024-09-29 DIAGNOSIS — G90.3 MULTIPLE SYSTEM ATROPHY: ICD-10-CM

## 2024-09-29 DIAGNOSIS — G23.8 MULTIPLE SYSTEM ATROPHY: ICD-10-CM

## 2024-09-29 DIAGNOSIS — I95.1 ORTHOSTASIS: ICD-10-CM

## 2024-09-30 ENCOUNTER — OFFICE VISIT (OUTPATIENT)
Dept: UROLOGY | Facility: CLINIC | Age: 59
End: 2024-09-30
Payer: MEDICARE

## 2024-09-30 ENCOUNTER — PATIENT MESSAGE (OUTPATIENT)
Dept: UROLOGY | Facility: CLINIC | Age: 59
End: 2024-09-30

## 2024-09-30 VITALS
SYSTOLIC BLOOD PRESSURE: 115 MMHG | HEART RATE: 80 BPM | DIASTOLIC BLOOD PRESSURE: 79 MMHG | HEIGHT: 66 IN | BODY MASS INDEX: 31.31 KG/M2

## 2024-09-30 DIAGNOSIS — R39.15 URINARY URGENCY: ICD-10-CM

## 2024-09-30 DIAGNOSIS — R33.9 INCOMPLETE BLADDER EMPTYING: ICD-10-CM

## 2024-09-30 DIAGNOSIS — N39.41 URGE INCONTINENCE: Primary | ICD-10-CM

## 2024-09-30 PROCEDURE — 99999 PR PBB SHADOW E&M-EST. PATIENT-LVL III: CPT | Mod: PBBFAC,,, | Performed by: UROLOGY

## 2024-09-30 PROCEDURE — 99213 OFFICE O/P EST LOW 20 MIN: CPT | Mod: PBBFAC | Performed by: UROLOGY

## 2024-09-30 PROCEDURE — 99213 OFFICE O/P EST LOW 20 MIN: CPT | Mod: S$PBB,,, | Performed by: UROLOGY

## 2024-09-30 RX ORDER — CARIPRAZINE 1.5 MG/1
1.5 CAPSULE, GELATIN COATED ORAL DAILY
COMMUNITY
Start: 2024-09-26 | End: 2024-10-24

## 2024-09-30 RX ORDER — MIDODRINE HYDROCHLORIDE 10 MG/1
15 TABLET ORAL
Qty: 405 TABLET | Refills: 3 | Status: SHIPPED | OUTPATIENT
Start: 2024-09-30

## 2024-09-30 RX ORDER — TROSPIUM CHLORIDE ER 60 MG/1
60 CAPSULE ORAL DAILY
Qty: 30 CAPSULE | Refills: 11 | Status: SHIPPED | OUTPATIENT
Start: 2024-09-30 | End: 2025-09-30

## 2024-09-30 NOTE — PROGRESS NOTES
CHIEF COMPLAINT:    Mrs. Kirkpatrick is a 58 y.o. female presenting for a post procedural visit    PRESENTING ILLNESS:    Nirali Kirkpatrick is a 58 y.o. female who is status post placement of an S/P tube by IR on 9/6/2024.  She messaged that the suture came out near the s/p tube and it was unclear if this was the suture that pulls the pigtail closed or if it was a stay suture.  Photos sent over My Chart could not clarify the situation so she was brought in a few weeks early to exchange the catheter.  She states she still leaks per urethra from time to time.  Has a crampy sensation when this occurs.       Past Surgical History:   Procedure Laterality Date    CHOLECYSTECTOMY      CYSTOSCOPY N/A 6/15/2021    Procedure: CYSTOSCOPY;  Surgeon: Susan Mcgee MD;  Location: [facility] OR [address] [place] [address];  Service: Urology;  Laterality: N/A;    CYSTOSCOPY N/A 7/13/2021    Procedure: CYSTOSCOPY;  Surgeon: Susan Mcgee MD;  Location: [facility] OR [address] [place] [address];  Service: Urology;  Laterality: N/A;    FLUOROSCOPIC URODYNAMIC STUDY N/A 6/15/2021    Procedure: URODYNAMIC STUDY, FLUOROSCOPIC;  Surgeon: Susan Mcgee MD;  Location: [facility] OR [address] [place] [address];  Service: Urology;  Laterality: N/A;  90 MINUTES     Gastric sleeve  2013    INSERTION OF MIDURETHRAL SLING N/A 7/13/2021    Procedure: SLING, MIDURETHRAL;  Surgeon: Susan Mcgee MD;  Location: [address];  Service: Urology;  Laterality: N/A;  1 HOUR     tubligation  2004       Allergies:  Patient has no known allergies.    Medications:  Outpatient Encounter Medications as of 9/30/2024   Medication Sig Dispense Refill    cariprazine (VRAYLAR) 1.5 mg Cap Take 1.5 mg by mouth once daily.      clonazePAM (KLONOPIN) 0.5 MG tablet TAKE 1 TABLET TWICE A DAY  AS NEEDED FOR ANXIETY 30 tablet 0    cyanocobalamin 1,000 mcg/mL injection 1,000 mcg every 30 days.       desvenlafaxine succinate (PRISTIQ) 100 MG Tb24 100 mg every evening.       fludrocortisone (FLORINEF) 0.1 mg Tab Take 2 tablets (200 mcg  total) by mouth once daily. 180 tablet 2    midodrine (PROAMATINE) 10 MG tablet Take 1.5 tablets (15 mg total) by mouth 3 (three) times daily with meals. 90 tablet 3    b complex vitamins tablet Take 1 tablet by mouth once daily. (Patient not taking: Reported on 9/30/2024)      hyoscyamine (ANASPAZ,LEVSIN) 0.125 mg Tab TAKE 1 TABLET BY MOUTH EVERY 6 HOURS AS NEEDED FOR URGENCY, URGE INCONTINENCE (Patient not taking: Reported on 9/30/2024) 360 tablet 3     No facility-administered encounter medications on file as of 9/30/2024.         PHYSICAL EXAMINATION:    The patient generally appears in good health, is appropriately interactive, and is in no apparent distress.    Skin: No lesions.    Mental: Cooperative with normal affect.    Neuro: Grossly intact.    HEENT: Normal. No evidence of lymphadenopathy.    Chest:  normal inspiratory effort.    Abdomen:  Soft, non-tender. No masses or organomegaly. Bladder is not palpable. No evidence of flank discomfort. No evidence of inguinal hernia.  The site is just over the pubic bone, not two finger breaths above.  Needed to pull the pannus up to replace the tube.  The tube had a tendency to buckle to the right side so it was held down to coax the tube into the bladder.  Placement was confirmed by irrigating with a catheter tipped syringe and aspirating it.       Extremities: No clubbing, cyanosis, or edema      IMPRESSION:    Post op state  Incomplete bladder emptying  History of multi system atrophy    PLAN:     The S/p tube was exchanged for another 16 Fr tube.  Was somewhat difficult.  See above.  Follow up in 4 weeks with me for the next tube exchange.   The patient was told that she might have gross hematuria for 1 week.   4.  Trospium was sent to her preferred pharmacy    I spent 20 minutes with the patient of which more than half was spent in direct consultation with the patient in regards to our treatment and plan.

## 2024-10-04 ENCOUNTER — TELEPHONE (OUTPATIENT)
Dept: NEUROLOGY | Facility: CLINIC | Age: 59
End: 2024-10-04
Payer: MEDICARE

## 2024-10-04 DIAGNOSIS — G90.3 MULTIPLE SYSTEM ATROPHY: Primary | ICD-10-CM

## 2024-10-04 DIAGNOSIS — G23.8 MULTIPLE SYSTEM ATROPHY: Primary | ICD-10-CM

## 2024-10-04 NOTE — TELEPHONE ENCOUNTER
----- Message from Alisa sent at 10/2/2024  3:59 PM CDT -----  Pt calling in regards to slide board to help pt tranfer out of bed       Confirmed patient's contact info below:  Contact Name: Nirali Kirkpatrick  Phone Number: [phone]

## 2024-10-04 NOTE — TELEPHONE ENCOUNTER
Pt called to request transfer board for use in bedroom. Called to inform her it will be ordered Monday, Oct 7 from [facility] and I will send a portal message with the agency's phone number to follow up.    She thanked me for the call.

## 2024-10-09 NOTE — PROGRESS NOTES
The patient location is: [place]  The chief complaint leading to consultation is: chronic respiratory failure    Visit type: audiovisual    20 minutes of total time spent on the encounter, which includes face to face time and non-face to face time preparing to see the patient (eg, review of tests), Obtaining and/or reviewing separately obtained history, Documenting clinical information in the electronic or other health record, Independently interpreting results (not separately reported) and communicating results to the patient/family/caregiver, or Care coordination (not separately reported).     Each patient to whom he or she provides medical services by telemedicine is:  (1) informed of the relationship between the physician and patient and the respective role of any other health care provider with respect to management of the patient; and (2) notified that he or she may decline to receive medical services by telemedicine and may withdraw from such care at any time.      ESTABLISHED PATIENT VISIT     Nirali Kirkpatrick  is a pleasant 59 y.o. female  established with the [facility] sleep medicine clinic    LOV 11.14.23  Here today for: follow-up.    Since last visit:   See interval history in table below    Past Medical History:   Diagnosis Date    Depression     Hypertension      Patient Active Problem List   Diagnosis    Ataxia    Vitamin B12 deficiency    Hashimoto's thyroiditis    Hyperparathyroidism    Weakness    Gait abnormality    REM sleep behavior disorder    Risk for falls    Decreased strength    Bladder disorder    Multiple system atrophy    AALIYAH (stress urinary incontinence, female)    Dysphagia    Dysarthria    Orthostasis    Shortness of breath    Restrictive lung mechanics due to neuromuscular disease       Current Outpatient Medications:     b complex vitamins tablet, Take 1 tablet by mouth once daily. (Patient not taking: Reported on 9/30/2024), Disp: , Rfl:     cariprazine (VRAYLAR) 1.5 mg Cap, Take  "1.5 mg by mouth once daily., Disp: , Rfl:     clonazePAM (KLONOPIN) 0.5 MG tablet, TAKE 1 TABLET TWICE A DAY  AS NEEDED FOR ANXIETY, Disp: 30 tablet, Rfl: 0    cyanocobalamin 1,000 mcg/mL injection, 1,000 mcg every 30 days. , Disp: , Rfl:     desvenlafaxine succinate (PRISTIQ) 100 MG Tb24, 100 mg every evening. , Disp: , Rfl:     fludrocortisone (FLORINEF) 0.1 mg Tab, Take 2 tablets (200 mcg total) by mouth once daily., Disp: 180 tablet, Rfl: 2    midodrine (PROAMATINE) 10 MG tablet, Take 1.5 tablets (15 mg total) by mouth 3 (three) times daily with meals., Disp: 405 tablet, Rfl: 3    trospium (SANCTURA XR) 60 mg Cp24 capsule, Take 1 capsule (60 mg total) by mouth once daily., Disp: 30 capsule, Rfl: 11     There were no vitals filed for this visit.   Physical Exam:    GEN:   Well-appearing  Psych:  Appropriate affect, demonstrates insight  SKIN:  No rash on the face or bridge of the nose      LABS:  Lab Results   Component Value Date    HGB 11.7 (L) 09/06/2024         RECORDS REVIEWED PREVIOUSLY:    Spirometry 3/24/23: supine: FEV1 56%, FVC 53%, ratio 0.82,   PFT 3/24/23: FEV1 71, FVC 67%, ratio 0.84, MIP 25 , MEP 43        Download   5/7/23: 32/32 x 6h 39 min, IPAP 10/13.9/20 (min/avg/max), EPAP 5, 8.6,14.8, RR 10/14/30., trigger 49/61/81, leak 0/33/200, Vt avg 430/1500, AVAPS AE Vt 400, (Pinsp 5-15)max 15, EPAP 5-15), rate auto  ?why is max pressure 20+ if Pinsp max is 15 (does it really mean PS)  -> consider increase epap to 9 June 2023 _> family worried about daytime stridor (with or without vent?)  "I run out of breath when I'm talking"   8.7.23: 85/90 x 4h 16min, IPAP (10/13.5/21.2), EPAP (5/7.5/15), RR 10/12/24), Triger 61%, Leak 10/19/73, VT (avg 622)  Settings VAPS AE, Vt 400ml, Pinsp 5-15, Epap 5-15, rate auto, sensitivity 0%        PMH: MSA, Hashimoto's, RBD,  neuromuscular respiratory failure 2' to MSA, Has been started on home NIV through [facility]    PROBLEM DESCRIPTION/ Sx on Presentation Interval Hx " STATUS PLAN   Chronic respiratory failure   Presentation:  Stridor, shortness of breath, MIP <60    2' to MSA, MIP <60    PAP history   Dx Study    Mask FFM  Tried mouth tape  Tried chin strap       DME [facility] [phone]  [phone]   [employer]    CPAP age 2023   PAP altn    Benefits    PROBS Trouble getting mask to fit, bitting the bottom of it        Since last visit:     Using trilogy    She is biting and tearing her mask    Tried mouth tape    Tried chin strap    No shortness of breath during the day    No recent stridor       stable         PAP PLAN   E min EPAP 5 cwp ()   I max AVAPS AE Vt 400, (Pinsp 5-15)max 15, EPAP 5-15), rate auto   PS/epr    RAMP    Other    Altn.         offered titration, but would be challenging for her to come to [place] for the study    -recommend trial of nasal mask (will ask [facility] to set her up)      The patient was using and benefitting from PAP therapy when they have proper supplies     Stridor 2' MSA    Less frequent with trilogy       Since last visit:       None recently     improved      Sleepiness   + sleepiness when inactive   ESS 18/24 on intake      SLEEP SCHEDULE   Duration    Wind- down    Envmnt    CBTi    Meds prior    Meds now    Bed Time 8P   Lights out    Latency 1-2 hrs   Arousals 2-3   Back to sleep    Stim. ctrl    Wake time 6AM   Caffeine    Naps one   Nocturia 2-3   Work     Was improved when using trilogy            Since last visit:       Did not assess     N/A      REM-behavior disorder  RBD: clonazepam 0.5    PAP: less dream enactment  Some sleep talking    Parasomnia   Onset    Description    Recall    Timing    Frequency About once per month of late   Triggers    Injuries    Sfety    SSRIs    Tremor/gait     Meds Prior    Meds Now         Events of dream enactment are much less frequent    Only occasionally taking      improved     -continue clonazepam 0.5mg for RBD as needed   Other issues:     RTC:  yearly or sooner if problems arise

## 2024-10-10 ENCOUNTER — OFFICE VISIT (OUTPATIENT)
Dept: SLEEP MEDICINE | Facility: CLINIC | Age: 59
End: 2024-10-10
Payer: MEDICARE

## 2024-10-10 DIAGNOSIS — G23.8 MULTIPLE SYSTEM ATROPHY: ICD-10-CM

## 2024-10-10 DIAGNOSIS — J96.12 CHRONIC RESPIRATORY FAILURE WITH HYPERCAPNIA: Primary | ICD-10-CM

## 2024-10-10 DIAGNOSIS — G90.3 MULTIPLE SYSTEM ATROPHY: ICD-10-CM

## 2024-10-10 DIAGNOSIS — R06.1 STRIDOR: ICD-10-CM

## 2024-10-14 ENCOUNTER — PATIENT MESSAGE (OUTPATIENT)
Dept: UROLOGY | Facility: CLINIC | Age: 59
End: 2024-10-14
Payer: MEDICARE

## 2024-10-16 ENCOUNTER — PATIENT MESSAGE (OUTPATIENT)
Dept: UROLOGY | Facility: CLINIC | Age: 59
End: 2024-10-16
Payer: MEDICARE

## 2024-10-24 ENCOUNTER — PATIENT MESSAGE (OUTPATIENT)
Dept: NEUROLOGY | Facility: CLINIC | Age: 59
End: 2024-10-24
Payer: MEDICARE

## 2024-10-25 DIAGNOSIS — R27.0 ATAXIA: Primary | ICD-10-CM

## 2024-10-25 DIAGNOSIS — G23.8 MULTIPLE SYSTEM ATROPHY: ICD-10-CM

## 2024-10-25 DIAGNOSIS — G90.3 MULTIPLE SYSTEM ATROPHY: ICD-10-CM

## 2024-10-28 ENCOUNTER — TELEPHONE (OUTPATIENT)
Dept: NEUROLOGY | Facility: CLINIC | Age: 59
End: 2024-10-28
Payer: MEDICARE

## 2024-10-29 ENCOUNTER — TELEPHONE (OUTPATIENT)
Dept: NEUROLOGY | Facility: CLINIC | Age: 59
End: 2024-10-29
Payer: MEDICARE

## 2024-10-31 ENCOUNTER — PATIENT MESSAGE (OUTPATIENT)
Dept: UROLOGY | Facility: CLINIC | Age: 59
End: 2024-10-31

## 2024-10-31 ENCOUNTER — OFFICE VISIT (OUTPATIENT)
Dept: UROLOGY | Facility: CLINIC | Age: 59
End: 2024-10-31
Payer: MEDICARE

## 2024-10-31 VITALS
WEIGHT: 188.94 LBS | HEART RATE: 82 BPM | BODY MASS INDEX: 30.49 KG/M2 | SYSTOLIC BLOOD PRESSURE: 138 MMHG | DIASTOLIC BLOOD PRESSURE: 95 MMHG

## 2024-10-31 DIAGNOSIS — R33.9 INCOMPLETE BLADDER EMPTYING: Primary | ICD-10-CM

## 2024-10-31 PROCEDURE — 99213 OFFICE O/P EST LOW 20 MIN: CPT | Mod: PBBFAC,25 | Performed by: UROLOGY

## 2024-10-31 PROCEDURE — 51705 CHANGE OF BLADDER TUBE: CPT | Mod: PBBFAC | Performed by: UROLOGY

## 2024-10-31 PROCEDURE — 99999 PR PBB SHADOW E&M-EST. PATIENT-LVL III: CPT | Mod: PBBFAC,,, | Performed by: UROLOGY

## 2024-11-06 ENCOUNTER — TELEPHONE (OUTPATIENT)
Dept: NEUROLOGY | Facility: CLINIC | Age: 59
End: 2024-11-06
Payer: MEDICARE

## 2024-11-06 NOTE — TELEPHONE ENCOUNTER
Pt called to report she has not received the sliding board for home use ordered on Oct. 7. She stated she was told we need to send in a new order by one person at [facility]. Another told her we do not send supplies to [facility] and a third told her he would mail it.    Sent email to Mr. Shaheen Sim at [facility] to inquire about the status and solicit information to complete the order.

## 2024-11-06 NOTE — TELEPHONE ENCOUNTER
Called pt to inform her that I received email from Shaheen Sim at [facility] and the sliding board would be mailed out today.

## 2024-11-19 ENCOUNTER — HOSPITAL ENCOUNTER (OUTPATIENT)
Dept: RADIOLOGY | Facility: HOSPITAL | Age: 59
Discharge: HOME | End: 2024-11-19
Attending: PSYCHIATRY & NEUROLOGY
Payer: MEDICARE

## 2024-11-19 DIAGNOSIS — R27.0 ATAXIA: ICD-10-CM

## 2024-11-19 DIAGNOSIS — G90.3 MULTIPLE SYSTEM ATROPHY: ICD-10-CM

## 2024-11-19 DIAGNOSIS — G23.8 MULTIPLE SYSTEM ATROPHY: ICD-10-CM

## 2024-11-19 PROCEDURE — 70551 MRI BRAIN STEM W/O DYE: CPT | Mod: 26,,, | Performed by: RADIOLOGY

## 2024-11-19 PROCEDURE — 70551 MRI BRAIN STEM W/O DYE: CPT | Mod: TC

## 2024-11-21 ENCOUNTER — PATIENT MESSAGE (OUTPATIENT)
Dept: NEUROLOGY | Facility: CLINIC | Age: 59
End: 2024-11-21
Payer: MEDICARE

## 2024-11-22 ENCOUNTER — TELEPHONE (OUTPATIENT)
Dept: NEUROLOGY | Facility: CLINIC | Age: 59
End: 2024-11-22
Payer: MEDICARE

## 2024-11-22 NOTE — TELEPHONE ENCOUNTER
----- Message from Venus sent at 11/22/2024  9:47 AM CST -----  Regarding: PTT AUBREY  Contact: [phone]  Pt is returning a missed call from someone in the office and is asking for a return call back soon. Thanks.     Reason for call:MISS CALL      Patient's DX:     Patient requesting call back or [facility] msg : [phone]

## 2024-11-25 ENCOUNTER — OFFICE VISIT (OUTPATIENT)
Dept: NEUROLOGY | Facility: CLINIC | Age: 59
End: 2024-11-25
Payer: MEDICARE

## 2024-11-25 DIAGNOSIS — G90.3 MULTIPLE SYSTEM ATROPHY: Primary | ICD-10-CM

## 2024-11-25 DIAGNOSIS — G23.8 MULTIPLE SYSTEM ATROPHY: Primary | ICD-10-CM

## 2024-11-25 PROCEDURE — 99215 OFFICE O/P EST HI 40 MIN: CPT | Mod: 95,,, | Performed by: PSYCHIATRY & NEUROLOGY

## 2024-11-25 PROCEDURE — G2211 COMPLEX E/M VISIT ADD ON: HCPCS | Mod: 95,,, | Performed by: PSYCHIATRY & NEUROLOGY

## 2024-11-25 RX ORDER — CARIPRAZINE 1.5 MG/1
1.5 CAPSULE, GELATIN COATED ORAL
COMMUNITY
Start: 2024-10-16 | End: 2025-04-14

## 2024-11-25 NOTE — ASSESSMENT & PLAN NOTE
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  Dysautonomia which also fits.  Started a trial at [facility] and she feels dysphagia is much better    Suggested continue exercising for balance.  Suggetsed start hobbies for manual dexterity    Suggested social work work for talk re: palliative care and home needs

## 2024-11-25 NOTE — PROGRESS NOTES
The patient location is: HOME  The chief complaint leading to visit is: MSA  No diagnosis found.    Visit type: Virtual visit with synchronous audio and video  Total time spent with patient: 20  Each patient to whom he or she provides medical services by telemedicine is:  (1) informed of the relationship between the physician and patient and the respective role of any other health care provider with respect to management of the patient; and (2) notified that he or she may decline to receive medical services by telemedicine and may withdraw from such care at any time.      MOVEMENT DISORDERS CLINIC Followup  PCP/Referring Provider: No referring provider defined for this encounter.  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  Started a clinical trial and continues    Dysphagia seems to have gotten better - eating a full diet    Taking midodrine TID 15mg/15/10mg  Still low Bps - orthostatism continues  Fludrocortisone 0.4mcg daily  BP still dropping to 60's and at time pre syncopal  Systolics not sustained over 160's    She got a suprapubic cath    Sleeps light at night- wishes she slept better    Wheelchair bound    No change to sweating  Does have REM sleep behavior    She was started on wraylar - at OSH - no worsening of Pdism and it is helping her mood    Her blurred vision is followed by optometry    Struggling to write  Can no longer do stairs.  Still having trouble writing checks   strength decreased   Speech is progressively slurred but an come and go throughout the day  Struggling to shower by herself- she feels extremely tired after this    Feels like memory is poor at  "times - trouble recalling names    Workup  MRI brain showed decrease in [place] contour suggesting of MSA  PAGE scans showed normal uptake  EMG was normal 2019  CT C/AB/PEL was neg for malignancy    MELVIN 65 NL  TPO POS  Notes her son was diagnosed with PANDAS and "has funny eye movements" - poor fine motor skills as a kid    Does have REM sleep behavior issues for 3 months    PD Review of Symptoms:  Anosmia: None  Dysarthria/Hypophonia: None  Dysphagia/Sialorrhea: None  Hallucinations: None  Depression: None  Cognitive slowing: None  Urinary changes: None  Constipation: at times  Orthostasis: none  Falls: as above  Micrographia: none  Sleep issues:  -SUSAN: at times snores  -RBD: Talks and punches in her sleep  -Sleep Quality: good, wakes every 3 hours"    "PriorHPI: Nirali Kirkpatrick is a L HANDED 59 y.o. female with a medical issues significant for HTN, Anxiety, Vit B12 deficiency, who presents with ataxia for 1.5 years. She first noted 1.5 years ago her R foot was dragging and every 3-4 steps she would catch that toe. She noticed her R foot would catch on curbs. Going down steps is especially hard - she mis-steps often. She feels like she has progressively been imbalanced. Her first fall was a year ago. She has fallen more often since then. She falls twice a month now. Falls typically sideways. She holds onto her  for stability but does not use an assist device. Tends to have more ataxia after she's tired. She occasionally has a had tremor in her hands when starting IVs. She does at times feel clumsy with her hands. No spinning vertigo. No lightheadedness. No visual issues.    No double vision, eye drooping, head drop, dysphagia.  She was told her B12 was low normal Aug 2018, and has had 4 shots since    No dysarthria.    She saw a general neurologist  B6 - NL  B12 - 333    Brain MRI read as normal  Spine and Tspine 2018 - no central disc issues  Has not had an EMG    No fam hx ataxia, MS  Uncle has M. " Gravis  Mother and brother have had tremors      Review of Systems:   Review of Systems   Constitutional:  Negative for fever.   HENT:  Negative for congestion.    Eyes:  Negative for double vision.   Respiratory:  Negative for cough and shortness of breath.    Cardiovascular:  Negative for chest pain and leg swelling.   Gastrointestinal:  Negative for nausea.   Genitourinary:  Negative for dysuria.   Musculoskeletal:  Positive for falls.   Skin:  Negative for rash.   Neurological:  Positive for tremors. Negative for speech change and headaches.   Psychiatric/Behavioral:  Negative for depression.          Current Medications:  Outpatient Encounter Medications as of 2024   Medication Sig Dispense Refill    clonazePAM (KLONOPIN) 0.5 MG tablet TAKE 1 TABLET TWICE A DAY  AS NEEDED FOR ANXIETY 30 tablet 0    cyanocobalamin 1,000 mcg/mL injection 1,000 mcg every 30 days.       desvenlafaxine succinate (PRISTIQ) 100 MG Tb24 100 mg every evening.       fludrocortisone (FLORINEF) 0.1 mg Tab Take 2 tablets (200 mcg total) by mouth once daily. 180 tablet 2    midodrine (PROAMATINE) 10 MG tablet Take 1.5 tablets (15 mg total) by mouth 3 (three) times daily with meals. 405 tablet 3    trospium (SANCTURA XR) 60 mg Cp24 capsule Take 1 capsule (60 mg total) by mouth once daily. 30 capsule 11    b complex vitamins tablet Take 1 tablet by mouth once daily. (Patient not taking: Reported on 2024)       No facility-administered encounter medications on file as of 2024.       Past Medical History:  HTN    Past Surgical History:  Gallbladder, gastric sleeve    Current Living Situation: home    Social:  Social History     Socioeconomic History    Marital status:    Tobacco Use    Smoking status: Former     Current packs/day: 0.00     Types: Cigarettes     Quit date:      Years since quittin.9    Smokeless tobacco: Never   Substance and Sexual Activity    Alcohol use: Not Currently    Drug use:  Never   Social History Narrative    ** Merged History Encounter **          Social Drivers of Health     Financial Resource Strain: Medium Risk (4/22/2024)    Overall Financial Resource Strain (CARDIA)     Difficulty of Paying Living Expenses: Somewhat hard   Food Insecurity: Food Insecurity Present (4/22/2024)    Hunger Vital Sign     Worried About Running Out of Food in the Last Year: Sometimes true     Ran Out of Food in the Last Year: Sometimes true   Transportation Needs: Unknown (4/22/2024)    PRAPARE - Transportation     Lack of Transportation (Non-Medical): No   Physical Activity: Unknown (4/22/2024)    Exercise Vital Sign     Days of Exercise per Week: 0 days   Stress: Stress Concern Present (4/22/2024)    [employer] - Occupational Stress Questionnaire     Feeling of Stress : Rather much   Housing Stability: Unknown (4/22/2024)    Housing Stability Vital Sign     Unable to Pay for Housing in the Last Year: No       Family History:  As above    PHYSICAL:  There were no vitals taken for this visit.    Physical Exam  Constitutional: Well-developed, well-nourished, appears stated age  Eyes: No scleral icterus  ENT: Moist oral mucosa  Cardiovascular: No lower extremity edema   Respiratory: No labored breathing   Skin: No rash   Hematologic: No bruising    Other: GI/ deferred   Mental status: Alert and oriented to person, place, time, and situation;   follows commands  Speech: severe dysarthria, no aphasia  Cranial nerves:            CN II: Pupils mid-position and equal, not tested light or accommodation  CN III, IV, VI: Extraocular movements full, no nystagmus visualized  CN V: Not tested   CN VII: Face strong and symmetric bilaterally   CN VIII: Hearing intact to voice and conversation   CN IX, X: Palate raises midline and symmetric   CN XI: Strong shoulder shrug B/L  CN XII: Tongue appears midline   Motor: Normal bulk by appearance, no drift   Sensory: Not tested    Gait:  "Not tested  Deep tendon reflexes: Not tested  Movement/Coordination                    No hypophonic speech.                     No facial masking.  Mild bradykinesia.  Mild L hand resting tremor  FNF R>L intention tremor  No stridor                  Bradykinesia    Finger taps Finger flicks PEDRO Heel taps   Left 0 0 0 0   Right 1+ 0 0 0       Tremor Exam   Arms extended Arms in wing position, fingers almost touching Re-emergent Arms extended wrists extended Intention Resting Kinetic   Left  ++    +    ++       Right  +        +           "General Medical Examination:  General: Good hygiene, appropriate appearance.  HEENT: Normocephalic, atraumatic.   Neck: Supple.   Chest: Unlabored breathing.   CV: Symmetric pulses.   Ext: No clubbing, cyanosis, or edema.     Mental Status:  Mood/Affect: Appropriate/congruent.  Level of consciousness: Awake, alert.  Orientation: Oriented to person, place, time and situation.  Language: Mod Dysarthria    Cranial nerves:  I: Not tested  II: PERRL, VFF to counting  III, IV, VI: EOMI with conjugate gaze and no nystagmus on end gaze - no SWJs  V: Facial sensation intact and symmetric over the bilateral V1-V3  VII: Facial muscle activation intact and symmetric over the bilateral upper and lower face  VIII: Hearing intact in the b/l ears and symmetrical to finger rub  IX, X, XII: TUP midline - no atrophy or fasiculations  X: SCMs and shoulder shrug full strength b/l and symmetric  Trouble with PA PA TA TA  Trouble with GAs and Naomy      Motor:   Cannot squeeze and hold 's hands   When lifting her knees she cannot overcome her  pushing it down  No stridor    Hyperreflexic 3+ at pattelae      Finger taps Finger flicks PEDRO Heel taps   Left nl nl nl nl   Right nl nl Incoordinated nl   Neck tone: nl    Arm Leg   Left nl nl   Right nl nl         Coordination:   -Finger to nose: intention tremor mild bilat  Past-points 1 inch R hand >L hand  Disdiatochokinesias R hand " "moderate  Disdiatochokinesias L hand mild  -Heel to shin: mild issues R foot  R hand spiral +++  L hand spiral ++    No resting or postural tremor    Gait:  -Arises from chair without use of hands.  -Casual gait is: wide based, somewhat stiff and staggering, circumducts, waves moderately L to R when she walks - mod ataxic   -Stride length: nl  -Arm Swing: decreased R  -Turning: wide  -Tandem gait: cannot  No foot drop"      Laboratory Data:  Results for RADHA HAHN (MRN [id number]) as of 6/3/2019 09:52   Ref. Range 5/2/2019 11:23   Folate Latest Ref Range: 4.0 - 24.0 ng/mL 10.7   Vitamin B-12 Latest Ref Range: 180 - 914 ng/L 525   Sodium Latest Ref Range: 136 - 145 mmol/L 141   Potassium Latest Ref Range: 3.5 - 5.1 mmol/L 3.7   Chloride Latest Ref Range: 95 - 110 mmol/L 106   CO2 Latest Ref Range: 23 - 29 mmol/L 26   Anion Gap Latest Ref Range: 8 - 16 mmol/L 9   BUN, Bld Latest Ref Range: 6 - 20 mg/dL 22 (H)   Creatinine Latest Ref Range: 0.5 - 1.4 mg/dL 0.8   eGFR if non African American Latest Ref Range: >60 mL/min/1.73 m^2 >60.0   eGFR if African American Latest Ref Range: >60 mL/min/1.73 m^2 >60.0   Glucose Latest Ref Range: 70 - 110 mg/dL 92   Calcium Latest Ref Range: 8.7 - 10.5 mg/dL 9.8   Alkaline Phosphatase Latest Ref Range: 55 - 135 U/L 86   PROTEIN TOTAL Latest Ref Range: 6.0 - 8.4 g/dL 7.4   Albumin Latest Ref Range: 3.5 - 5.2 g/dL 4.2   BILIRUBIN TOTAL Latest Ref Range: 0.1 - 1.0 mg/dL 0.6   AST Latest Ref Range: 10 - 40 U/L 19   ALT Latest Ref Range: 10 - 44 U/L 16   Ammonia Latest Ref Range: 10 - 50 umol/L 26   Arsenic Latest Ref Range: 0 - 12 ng/mL <1   Thiamine Latest Ref Range: 38 - 122 ug/L 63   Vitamin E Latest Ref Range: 500 - 1800 ug/dL 1302   Glutamic Acid Decarb Ab Latest Ref Range: <=0.02 nmol/L 0.00   TSH Latest Ref Range: 0.400 - 4.000 uIU/mL 0.649   PTH Latest Ref Range: 9.0 - 77.0 pg/mL 103.0 (H)   Race Unknown Test Not Performed   AChR Binding Ab, Serum Latest Ref Range: <=0.02 " nmol/L 0.00   AChR Ganglionic Neuronal Ab Latest Ref Range: <=0.02 nmol/L 0.00   CRMP-5 IgG Latest Ref Range: <1:240 titer Negative   Neuronal (V-G) K+ Channel Ab, Serum Latest Ref Range: <=0.02 nmol/L 0.00   NMO Interpretive Comments Unknown SEE BELOW   N-Type Calcium Channel Ab Latest Ref Range: <=0.03 nmol/L 0.00   P/Q Type Calcium Channel Ab Latest Ref Range: <=0.02 nmol/L 0.00   PAVAL AGNA-1, Serum Latest Ref Range: <1:240 titer Negative   PAVAL TOBY-1, Serum Latest Ref Range: <1:240 titer Negative   PAVAL TOBY-2, Serum Latest Ref Range: <1:240 titer Negative   PAVAL TOBY-3, Serum Latest Ref Range: <1:240 titer Negative   PAVAL reflex test added Unknown None.   PAVAL,  Amphiphysin Ab, Serum Latest Ref Range: <1:240 titer Negative   PAVAL, PCA-1, Serum Latest Ref Range: <1:240 titer Negative   PAVAL, PCA-2, Serum Latest Ref Range: <1:240 titer Negative   PAVAL, PCA-Tr, Serum Latest Ref Range: <1:240 titer Negative   STRIATED MUSCLE AB Latest Ref Range: <1:120 titer Negative   Lead Latest Ref Range: 0.0 - 4.9 mcg/dL <1.0   Cadmium Latest Ref Range: 0.0 - 4.9 ng/mL 0.3   City Unknown Test Not Performed   County Unknown Test Not Performed   Guardian First Name Unknown Test Not Performed   Guardian Last Name Unknown Test Not Performed   Home Phone Unknown Test Not Performed   [place] Latest Ref Range: 0 - 9 ng/mL 2   State Unknown Test Not Performed   Street Address Unknown Test Not Performed   Venous/Capillary Unknown Test Not Performed   Zip Unknown Test Not Performed      Results for RADHA HAHN (MRN [id number]) as of 8/23/2019 10:10   Ref. Range 6/3/2019 10:27   Sed Rate Latest Ref Range: 0 - 36 mm/Hr 13   CRP Latest Ref Range: 0.0 - 8.2 mg/L 0.5   CPK Latest Ref Range: 20 - 180 U/L 55   CERULOPLASMIN Latest Ref Range: 15.0 - 45.0 mg/dL 33.0   Acetylchol Modul Ab Latest Units: % 0   AChR Binding Ab, Serum Latest Ref Range: <=0.02 nmol/L 0.00   MG Interpretive Comments Unknown SEE BELOW   MuSK Antibody Test  Latest Ref Range: 0.00 - 0.02 nmol/L 0.00   STRIATED MUSCLE AB Latest Ref Range: <1:120 titer Negative   Aldolase Latest Ref Range: 1.2 - 7.6 U/L 3.5       Imaging:  Brain MRI w/o cerebellar degeneration or lesion - however poor quality MRI  Repeat brain MRI  FINDINGS:  MRI of the brain demonstrates no infarction, mass, hemorrhage, extra-axial collection, or other acute finding.  A skull base lesions are seen.  Following contrast administration, there is no abnormal enhancing lesion identified.  A small incidental developmental venous anomaly is seen in the right medial posterior thalamus.  Cspine w/o without central impingement    EMG  Summary   Nerve conduction study of bilateral lower extremities revealed normal antidromic sensory peak latencies, action potentials, and conduction velocities.   Motor peak latencies, amplitudes, and conduction velocities were normal. F-waves were normal.   Concentric needle examination of selected muscles in bilateral lower extremities revealed no evidence of acute or chronic denervation.   Impression   This is a normal study.     Assessment//Plan:   Problem List Items Addressed This Visit    None          Ondina Polo MD, MS  [facility]  Department of Neurology  Movement Disorders      Suggested talk to social work re: goals of care as her MSA is progressing and when to reach out to palliative care    Suggested for better sleep try melatonin 5-6mg QHS

## 2024-12-02 ENCOUNTER — PATIENT MESSAGE (OUTPATIENT)
Dept: SLEEP MEDICINE | Facility: CLINIC | Age: 59
End: 2024-12-02
Payer: MEDICARE

## 2024-12-03 ENCOUNTER — OFFICE VISIT (OUTPATIENT)
Dept: UROLOGY | Facility: CLINIC | Age: 59
End: 2024-12-03
Payer: MEDICARE

## 2024-12-03 VITALS
DIASTOLIC BLOOD PRESSURE: 91 MMHG | HEART RATE: 88 BPM | BODY MASS INDEX: 30.14 KG/M2 | SYSTOLIC BLOOD PRESSURE: 129 MMHG | WEIGHT: 186.75 LBS

## 2024-12-03 DIAGNOSIS — L92.9 GRANULATION TISSUE OF SKIN: ICD-10-CM

## 2024-12-03 DIAGNOSIS — Z43.5 ENCOUNTER FOR CARE OR REPLACEMENT OF SUPRAPUBIC TUBE: Primary | ICD-10-CM

## 2024-12-03 PROCEDURE — 99999 PR PBB SHADOW E&M-EST. PATIENT-LVL III: CPT | Mod: PBBFAC,,, | Performed by: UROLOGY

## 2024-12-03 PROCEDURE — 51705 CHANGE OF BLADDER TUBE: CPT | Mod: PBBFAC | Performed by: UROLOGY

## 2024-12-03 PROCEDURE — 17250 CHEM CAUT OF GRANLTJ TISSUE: CPT | Mod: PBBFAC | Performed by: UROLOGY

## 2024-12-03 PROCEDURE — 99213 OFFICE O/P EST LOW 20 MIN: CPT | Mod: PBBFAC,25 | Performed by: UROLOGY

## 2024-12-04 ENCOUNTER — PATIENT MESSAGE (OUTPATIENT)
Dept: NEUROLOGY | Facility: CLINIC | Age: 59
End: 2024-12-04
Payer: MEDICARE

## 2024-12-04 DIAGNOSIS — J96.10 CHRONIC RESPIRATORY FAILURE, UNSPECIFIED WHETHER WITH HYPOXIA OR HYPERCAPNIA: Primary | ICD-10-CM

## 2024-12-04 DIAGNOSIS — G90.3 MULTIPLE SYSTEM ATROPHY: ICD-10-CM

## 2024-12-04 DIAGNOSIS — G23.8 MULTIPLE SYSTEM ATROPHY: ICD-10-CM

## 2024-12-04 NOTE — PROGRESS NOTES
CHIEF COMPLAINT:    Mrs. Kirkpatrick is a 59 y.o. female presenting for a suprapubic tube exchange    PRESENTING ILLNESS:    Nirali Kirkpatrick is a 59 y.o. female who presents with a history of MSA. She has incomplete bladder emptying and had a suprapubic tube placed by interventional radiology. She presents today for catheter exchange. The last time it was exchanged for a 16 Fr catheter.     REVIEW OF SYSTEMS:    Review of Systems   Constitutional: Negative.    HENT: Negative.     Eyes: Negative.    Respiratory: Negative.     Cardiovascular: Negative.    Gastrointestinal: Negative.    Genitourinary:         Managed with stevens catheter   Musculoskeletal: Negative.    Skin: Negative.    Neurological:  Positive for weakness.   Endo/Heme/Allergies: Negative.    Psychiatric/Behavioral: Negative.         PATIENT HISTORY:    Past Medical History:   Diagnosis Date    Depression     Hypertension        Past Surgical History:   Procedure Laterality Date    CHOLECYSTECTOMY      CYSTOSCOPY N/A 6/15/2021    Procedure: CYSTOSCOPY;  Surgeon: Susan Mcgee MD;  Location: [facility] OR [address] [place] [address];  Service: Urology;  Laterality: N/A;    CYSTOSCOPY N/A 7/13/2021    Procedure: CYSTOSCOPY;  Surgeon: Susan Mcgee MD;  Location: [facility] OR [address] [place] [address];  Service: Urology;  Laterality: N/A;    FLUOROSCOPIC URODYNAMIC STUDY N/A 6/15/2021    Procedure: URODYNAMIC STUDY, FLUOROSCOPIC;  Surgeon: Susan Mcgee MD;  Location: [facility] OR [address] [place] [address];  Service: Urology;  Laterality: N/A;  90 MINUTES     Gastric sleeve  2013    INSERTION OF MIDURETHRAL SLING N/A 7/13/2021    Procedure: SLING, MIDURETHRAL;  Surgeon: Susan Mcgee MD;  Location: [facility] OR [address] [place] [address];  Service: Urology;  Laterality: N/A;  1 HOUR     tubligation  2004       Family History   Problem Relation Name Age of Onset    Stroke Mother      Hypertension Mother      Heart disease Mother      Heart disease Father      Lung disease Father      Hyperlipidemia Father      Hypertension Father          Social History     Socioeconomic History    Marital status:    Tobacco Use    Smoking status: Former     Current packs/day: 0.00     Types: Cigarettes     Quit date:      Years since quittin.9    Smokeless tobacco: Never   Substance and Sexual Activity    Alcohol use: Not Currently    Drug use: Never   Social History Narrative    ** Merged History Encounter **          Social Drivers of Health     Financial Resource Strain: Medium Risk (2024)    Overall Financial Resource Strain (CARDIA)     Difficulty of Paying Living Expenses: Somewhat hard   Food Insecurity: Food Insecurity Present (2024)    Hunger Vital Sign     Worried About Running Out of Food in the Last Year: Sometimes true     Ran Out of Food in the Last Year: Sometimes true   Transportation Needs: Unknown (2024)    PRAPARE - Transportation     Lack of Transportation (Non-Medical): No   Physical Activity: Unknown (2024)    Exercise Vital Sign     Days of Exercise per Week: 0 days   Stress: Stress Concern Present (2024)    [employer] - Occupational Stress Questionnaire     Feeling of Stress : Rather much   Housing Stability: Unknown (2024)    Housing Stability Vital Sign     Unable to Pay for Housing in the Last Year: No       Allergies:  Patient has no known allergies.    Medications:  Outpatient Encounter Medications as of 12/3/2024   Medication Sig Dispense Refill    b complex vitamins tablet Take 1 tablet by mouth once daily. (Patient not taking: Reported on 2024)      clonazePAM (KLONOPIN) 0.5 MG tablet TAKE 1 TABLET TWICE A DAY  AS NEEDED FOR ANXIETY 30 tablet 0    cyanocobalamin 1,000 mcg/mL injection 1,000 mcg every 30 days.       desvenlafaxine succinate (PRISTIQ) 100 MG Tb24 100 mg every evening.       fludrocortisone (FLORINEF) 0.1 mg Tab Take 2 tablets (200 mcg total) by mouth once daily. 180 tablet 2    midodrine (PROAMATINE) 10 MG tablet Take 1.5 tablets (15  mg total) by mouth 3 (three) times daily with meals. 405 tablet 3    trospium (SANCTURA XR) 60 mg Cp24 capsule Take 1 capsule (60 mg total) by mouth once daily. 30 capsule 11    VRAYLAR 1.5 mg Cap 1.5 mg.       No facility-administered encounter medications on file as of 12/3/2024.         PHYSICAL EXAMINATION:    The patient generally appears in good health, is appropriately interactive, and is in no apparent distress.    Skin: No lesions.    Mental: Cooperative with normal affect.    Neuro: Grossly intact.    HEENT: Normal. No evidence of lymphadenopathy.    Chest:  normal inspiratory effort.    Abdomen: Soft, non-tender. No masses or organomegaly. Bladder is not palpable. No evidence of flank discomfort. No evidence of inguinal hernia.  There is a small amount of hyperplastic granulation tissue that bled after removal of the suprapubic tube.      Extremities: No clubbing, cyanosis, or edema      LABS:    Lab Results   Component Value Date    BUN 20 07/14/2021    CREATININE 0.7 07/14/2021     IMPRESSION:    Incomplete bladder emptying  Granulation tissue treated with silver nitrate     PLAN:    1. A 16 Fr coude catheter was placed easily.  The tract is well formed and can now be changed by home health with a regular catheter.    2.  Pressure was placed to the hyperplastic granulation tissue.  Then it was treated with silver nitrate. Patient was warned that it will look black.  3.  She just started home PT with [facility] out of [place].  Will send orders for home health to exchange the suprapubic tube.  A dry dressing was placed to the catheter.  Taped with paper tape across the top only.     I spent a total of 40 minutes on the day of the visit.  This includes face to face time and non-face to face time preparing to see the patient (eg, review of tests), obtaining and/or reviewing separately obtained history, documenting clinical information in the electronic or other health record, independently interpreting  results and communicating results to the patient/family/caregiver, or care coordinator.

## 2024-12-18 ENCOUNTER — PATIENT MESSAGE (OUTPATIENT)
Dept: UROLOGY | Facility: CLINIC | Age: 59
End: 2024-12-18
Payer: MEDICARE

## 2024-12-18 NOTE — TELEPHONE ENCOUNTER
Orders were placed to [facility] [place] Ron see letter in Communications.     Phone is [phone] and Fax is [phone]

## 2024-12-18 NOTE — LETTER
2024    Nirali Kirkpatrick  [address]  [place] [address]             [facility] ([facility]) - [address]  [address]  [place] [address]  Phone: [phone]  Fax: [phone] To Whom It May Concern:    Mrs. Nirali Kirkpatrick  1965 has a suprapubic tube which needs to be changed monthly.  Orders for managing the suprapubic tube is as follows:     1.  Change the catheter monthly with a 16 Fr catheter 10 ml balloon. Give 2 leg bags and 1 large stevens drainage bag per month.  2.  Please teach the patient how to exchange the bags and teach stevens care.  3.  Secure the catheter to the patient's leg with a Stat lock so that the catheter drains easily. Do not secure above the suprapubic tube opening or it will not drain well.    4.  If the patient reports having a UTI (I.e. Bladder irritation with urgency, incontinence or leaking around the tube, or fever and malaise) OK to schedule an extra visit to change the tube, get a urine specimen from the new tube. Please send the urine from the new tube for urine culture and sensitivity.    5.  OK to trouble shoot the catheter if it is not draining properly.  Place a 60 cc catheter tipped syringe and aspirate first, then may irrigate with 30 ml sterile saline and drain. If further irrigation is needed, place 120 ml in the bladder first, then irrigate 60 ml out until the catheter is functioning.  If this does not work, please remove the old catheter and place a new one.    If there are any questions, please call [phone].  Reports can be faxed to [phone].  Do not fax queries as I am not always in the office, please call and speak to one of our nurses.    If you have any questions or concerns, please don't hesitate to call.    Sincerely,        Susan Mcgee MD

## 2025-02-12 ENCOUNTER — PATIENT MESSAGE (OUTPATIENT)
Dept: UROLOGY | Facility: CLINIC | Age: 60
End: 2025-02-12
Payer: MEDICARE

## 2025-03-12 DIAGNOSIS — R06.02 SHORTNESS OF BREATH: ICD-10-CM

## 2025-03-12 RX ORDER — FLUDROCORTISONE ACETATE 0.1 MG/1
200 TABLET ORAL
Qty: 180 TABLET | Refills: 3 | Status: SHIPPED | OUTPATIENT
Start: 2025-03-12

## 2025-04-16 ENCOUNTER — TELEPHONE (OUTPATIENT)
Facility: CLINIC | Age: 60
End: 2025-04-16
Payer: MEDICARE

## 2025-04-16 ENCOUNTER — PATIENT MESSAGE (OUTPATIENT)
Facility: CLINIC | Age: 60
End: 2025-04-16
Payer: MEDICARE

## 2025-04-22 ENCOUNTER — PATIENT MESSAGE (OUTPATIENT)
Facility: CLINIC | Age: 60
End: 2025-04-22
Payer: MEDICARE

## 2025-04-24 ENCOUNTER — TELEPHONE (OUTPATIENT)
Facility: CLINIC | Age: 60
End: 2025-04-24
Payer: MEDICARE

## 2025-04-24 NOTE — TELEPHONE ENCOUNTER
"----- Message from Med Assistant Moses sent at 4/23/2025  2:56 PM CDT -----  Regarding: FW: pt advice  Contact: [phone]  Pt call back  ----- Message -----  From: Cate Zabala  Sent: 4/22/2025  11:12 AM CDT  To: [facility] Staff  Subject: pt advice                                        .Name Of Caller: Suhail/   Contact Preference?: [phone] What is the nature of the call?: Returning call to needing an email, in reference to pt sending in pt disability forms to have provider completes them with an signature, pls emmett  Additional Notes:"Thank you for all that you do for our patients"  "

## 2025-07-09 ENCOUNTER — PATIENT MESSAGE (OUTPATIENT)
Facility: CLINIC | Age: 60
End: 2025-07-09
Payer: MEDICARE

## 2025-07-09 ENCOUNTER — TELEPHONE (OUTPATIENT)
Facility: CLINIC | Age: 60
End: 2025-07-09
Payer: MEDICARE

## 2025-07-09 NOTE — TELEPHONE ENCOUNTER
Pt is requesting hospital bed but needs MD visit with DME justification note in assessment.  Sent a my chart message to pt. Unable to reach by phone.    Scheduled VV for July 21.

## 2025-07-09 NOTE — TELEPHONE ENCOUNTER
Copied from CRM #[id number]. Topic: General Inquiry - Return Call  >> Jul 9, 2025  1:27 PM Brenda wrote        Caller can be reached @:[phone] (home)   Returned call. Left message.

## 2025-07-21 ENCOUNTER — OFFICE VISIT (OUTPATIENT)
Facility: CLINIC | Age: 60
End: 2025-07-21
Payer: MEDICARE

## 2025-07-21 ENCOUNTER — TELEPHONE (OUTPATIENT)
Facility: CLINIC | Age: 60
End: 2025-07-21

## 2025-07-21 DIAGNOSIS — G23.8 MULTIPLE SYSTEM ATROPHY: Primary | ICD-10-CM

## 2025-07-21 DIAGNOSIS — G90.3 MULTIPLE SYSTEM ATROPHY: Primary | ICD-10-CM

## 2025-07-21 DIAGNOSIS — I95.1 ORTHOSTASIS: ICD-10-CM

## 2025-07-21 DIAGNOSIS — G47.00 INSOMNIA, UNSPECIFIED TYPE: ICD-10-CM

## 2025-07-21 DIAGNOSIS — R06.02 SHORTNESS OF BREATH: ICD-10-CM

## 2025-07-21 PROCEDURE — 98007 SYNCH AUDIO-VIDEO EST HI 40: CPT | Mod: 95,,, | Performed by: PSYCHIATRY & NEUROLOGY

## 2025-07-21 PROCEDURE — G2211 COMPLEX E/M VISIT ADD ON: HCPCS | Mod: 95,,, | Performed by: PSYCHIATRY & NEUROLOGY

## 2025-07-21 RX ORDER — MIRTAZAPINE 7.5 MG/1
7.5 TABLET, FILM COATED ORAL NIGHTLY
Qty: 30 TABLET | Refills: 11 | Status: SHIPPED | OUTPATIENT
Start: 2025-07-21 | End: 2025-07-21

## 2025-07-21 RX ORDER — ARIPIPRAZOLE 2 MG/1
2 TABLET ORAL NIGHTLY
COMMUNITY
Start: 2025-07-07

## 2025-07-21 RX ORDER — MIRTAZAPINE 7.5 MG/1
7.5 TABLET, FILM COATED ORAL NIGHTLY
Qty: 30 TABLET | Refills: 11 | Status: SHIPPED | OUTPATIENT
Start: 2025-07-21 | End: 2026-07-21

## 2025-07-21 NOTE — TELEPHONE ENCOUNTER
Patient retuned my call in regards to rescheduling her follow up appt with MD Melani. Patient has been scheduled for a follow up virtual visit with MD Melani

## 2025-07-21 NOTE — PROGRESS NOTES
The patient location is: HOME  The chief complaint leading to visit is: MSA  1. Multiple system atrophy        2. Shortness of breath        3. Orthostasis        4. Insomnia, unspecified type            Visit type: Virtual visit with synchronous audio and video  Total time spent with patient: 40  Each patient to whom he or she provides medical services by telemedicine is:  (1) informed of the relationship between the physician and patient and the respective role of any other health care provider with respect to management of the patient; and (2) notified that he or she may decline to receive medical services by telemedicine and may withdraw from such care at any time.      MOVEMENT DISORDERS CLINIC Followup  PCP/Referring Provider: No referring provider defined for this encounter.  Date of Service: 7/21/2025    Chief Complaint: Ataxia    Interval Hx    Since last visit,     Disease progressing - less mobile  Did start aripiprazole by OSH since 6 mos    Now needs assist to stand but extremely unstable and needs assist for all transfers  In bed feels strapped, can't move, roll over, can't reposition  Need full assist with transfers    Starting to think about end of life needs  Not interested in life supporting therapies such as peg tube  Using a vent at night now this is annoying to her  Was wearing her BiPAP due to night-time and daytime stridor - stopped due to annoyance    Completed clinical trial at [facility]  Dysphagia seems to have gotten better - eating a full diet  Stopped both fluoro and midodrine due to elevated BP      Was taking midodrine TID 15mg/15/10mg  Still low Bps - orthostatism continues  Fludrocortisone 0.4mcg daily    Continues suprapubic cath    Sleeps light at night- wishes she slept better    Wheelchair bound    No change to sweating  Does have REM sleep behavior      Her blurred vision is followed by optometry    Struggling to write  Can no longer do stairs.  Still having trouble writing  "checks   strength decreased   Speech is progressively slurred but an come and go throughout the day  Struggling to shower by herself- she feels extremely tired after this    Feels like memory is poor at times - trouble recalling names    Workup  MRI brain showed decrease in [place] contour suggesting of MSA  PAGE scans showed normal uptake  EMG was normal 2019  CT C/AB/PEL was neg for malignancy    MELVIN 65 NL  TPO POS  Notes her son was diagnosed with PANDAS and "has funny eye movements" - poor fine motor skills as a kid    Does have REM sleep behavior issues for 3 months    PD Review of Symptoms:  Anosmia: None  Dysarthria/Hypophonia: None  Dysphagia/Sialorrhea: None  Hallucinations: None  Depression: None  Cognitive slowing: None  Urinary changes: None  Constipation: at times  Orthostasis: none  Falls: as above  Micrographia: none  Sleep issues:  -SUSAN: at times snores  -RBD: Talks and punches in her sleep  -Sleep Quality: good, wakes every 3 hours"    "PriorHPI: Nirali Kirkpatrick is a L HANDED 59 y.o. female with a medical issues significant for HTN, Anxiety, Vit B12 deficiency, who presents with ataxia for 1.5 years. She first noted 1.5 years ago her R foot was dragging and every 3-4 steps she would catch that toe. She noticed her R foot would catch on curbs. Going down steps is especially hard - she mis-steps often. She feels like she has progressively been imbalanced. Her first fall was a year ago. She has fallen more often since then. She falls twice a month now. Falls typically sideways. She holds onto her  for stability but does not use an assist device. Tends to have more ataxia after she's tired. She occasionally has a had tremor in her hands when starting IVs. She does at times feel clumsy with her hands. No spinning vertigo. No lightheadedness. No visual issues.    No double vision, eye drooping, head drop, dysphagia.  She was told her B12 was low normal Aug 2018, and has had 4 shots since    No " dysarthria.    She saw a general neurologist  B6 - NL  B12 - 333    Brain MRI read as normal  Spine and Tspine 2018 - no central disc issues  Has not had an EMG    No fam hx ataxia, MS  Uncle has M. Gravis  Mother and brother have had tremors      Review of Systems:   Review of Systems   Constitutional:  Negative for fever.   HENT:  Negative for congestion.    Eyes:  Negative for double vision.   Respiratory:  Negative for cough and shortness of breath.    Cardiovascular:  Negative for chest pain and leg swelling.   Gastrointestinal:  Negative for nausea.   Genitourinary:  Negative for dysuria.   Musculoskeletal:  Positive for falls.   Skin:  Negative for rash.   Neurological:  Positive for tremors. Negative for speech change and headaches.   Psychiatric/Behavioral:  Negative for depression.          Current Medications:  Outpatient Encounter Medications as of 7/21/2025   Medication Sig Dispense Refill    ARIPiprazole (ABILIFY) 2 MG Tab Take 2 mg by mouth every evening.      b complex vitamins tablet Take 1 tablet by mouth once daily.      clonazePAM (KLONOPIN) 0.5 MG tablet TAKE 1 TABLET TWICE A DAY  AS NEEDED FOR ANXIETY 30 tablet 0    cyanocobalamin 1,000 mcg/mL injection 1,000 mcg every 30 days.       desvenlafaxine succinate (PRISTIQ) 100 MG Tb24 100 mg every evening.       trospium (SANCTURA XR) 60 mg Cp24 capsule Take 1 capsule (60 mg total) by mouth once daily. 30 capsule 11    fludrocortisone (FLORINEF) 0.1 mg Tab Take 2 tablets by mouth once daily 180 tablet 3    midodrine (PROAMATINE) 10 MG tablet Take 1.5 tablets (15 mg total) by mouth 3 (three) times daily with meals. 405 tablet 3    mirtazapine (REMERON) 7.5 MG Tab Take 1 tablet (7.5 mg total) by mouth every evening. 30 tablet 11     No facility-administered encounter medications on file as of 7/21/2025.       Past Medical History:  HTN    Past Surgical History:  Gallbladder, gastric sleeve    Current Living Situation: home    Social:  Social History      Socioeconomic History    Marital status:    Tobacco Use    Smoking status: Former     Current packs/day: 0.00     Types: Cigarettes     Quit date:      Years since quittin.5    Smokeless tobacco: Never   Substance and Sexual Activity    Alcohol use: Not Currently    Drug use: Never   Social History Narrative    ** Merged History Encounter **          Social Drivers of Health     Financial Resource Strain: Low Risk  (2025)    Overall Financial Resource Strain (CARDIA)     Difficulty of Paying Living Expenses: Not hard at all   Food Insecurity: No Food Insecurity (2025)    Hunger Vital Sign     Worried About Running Out of Food in the Last Year: Never true     Ran Out of Food in the Last Year: Never true   Transportation Needs: No Transportation Needs (2025)    PRAPARE - Transportation     Lack of Transportation (Medical): No     Lack of Transportation (Non-Medical): No   Physical Activity: Inactive (2025)    Exercise Vital Sign     Days of Exercise per Week: 0 days     Minutes of Exercise per Session: 0 min   Stress: No Stress Concern Present (2025)    [employer] - Occupational Stress Questionnaire     Feeling of Stress : Only a little   Housing Stability: Low Risk  (2025)    Housing Stability Vital Sign     Unable to Pay for Housing in the Last Year: No     Homeless in the Last Year: No       Family History:  As above    PHYSICAL:  There were no vitals taken for this visit.    Physical Exam  Constitutional: Well-developed, well-nourished, appears stated age  Eyes: No scleral icterus  ENT: Moist oral mucosa  Cardiovascular: No lower extremity edema   Respiratory: No labored breathing   Skin: No rash   Hematologic: No bruising    Other: GI/ deferred   Mental status: Alert and oriented to person, place, time, and situation;   follows commands  Speech: severe dysarthria, no aphasia  Cranial nerves:            CN II: Pupils mid-position and  "equal, not tested light or accommodation  CN III, IV, VI: Extraocular movements full, no nystagmus visualized  CN V: Not tested   CN VII: Face strong and symmetric bilaterally   CN VIII: Hearing intact to voice and conversation   CN IX, X: Palate raises midline and symmetric   CN XI: Strong shoulder shrug B/L  CN XII: Tongue appears midline   Motor: Normal bulk by appearance, no drift   Sensory: Not tested    Gait: Not tested  Deep tendon reflexes: Not tested  Movement/Coordination                    No hypophonic speech.                     No facial masking.  Mild bradykinesia.  Mild L hand resting tremor  FNF R>L intention tremor  No stridor                  Bradykinesia  ? Finger taps Finger flicks PEDRO Heel taps   Left 2+ 0 0 0   Right 1+ 0 0 0       Tremor Exam   Arms extended Arms in wing position, fingers almost touching Re-emergent Arms extended wrists extended Intention Resting Kinetic   Left ?++ ? ?+ ? ?++ ? ?   Right ?+ ? ? ? ?+ ? ?       "General Medical Examination:  General: Good hygiene, appropriate appearance.  HEENT: Normocephalic, atraumatic.   Neck: Supple.   Chest: Unlabored breathing.   CV: Symmetric pulses.   Ext: No clubbing, cyanosis, or edema.     Mental Status:  Mood/Affect: Appropriate/congruent.  Level of consciousness: Awake, alert.  Orientation: Oriented to person, place, time and situation.  Language: Mod Dysarthria    Cranial nerves:  I: Not tested  II: PERRL, VFF to counting  III, IV, VI: EOMI with conjugate gaze and no nystagmus on end gaze - no SWJs  V: Facial sensation intact and symmetric over the bilateral V1-V3  VII: Facial muscle activation intact and symmetric over the bilateral upper and lower face  VIII: Hearing intact in the b/l ears and symmetrical to finger rub  IX, X, XII: TUP midline - no atrophy or fasiculations  X: SCMs and shoulder shrug full strength b/l and symmetric  Trouble with PA PA TA TA  Trouble with GAs and Naomy      Motor:   Cannot squeeze and hold " "'s hands   When lifting her knees she cannot overcome her  pushing it down  No stridor    Hyperreflexic 3+ at pattelae    ? Finger taps Finger flicks PEDRO Heel taps   Left nl nl nl nl   Right nl nl Incoordinated nl   Neck tone: nl  ? Arm Leg   Left nl nl   Right nl nl         Coordination:   -Finger to nose: intention tremor mild bilat  Past-points 1 inch R hand >L hand  Disdiatochokinesias R hand moderate  Disdiatochokinesias L hand mild  -Heel to shin: mild issues R foot  R hand spiral +++  L hand spiral ++    No resting or postural tremor    Gait:  -Arises from chair without use of hands.  -Casual gait is: wide based, somewhat stiff and staggering, circumducts, waves moderately L to R when she walks - mod ataxic   -Stride length: nl  -Arm Swing: decreased R  -Turning: wide  -Tandem gait: cannot  No foot drop"      Laboratory Data:  Results for RADHA HAHN (MRN [id number]) as of 6/3/2019 09:52   Ref. Range 5/2/2019 11:23   Folate Latest Ref Range: 4.0 - 24.0 ng/mL 10.7   Vitamin B-12 Latest Ref Range: 180 - 914 ng/L 525   Sodium Latest Ref Range: 136 - 145 mmol/L 141   Potassium Latest Ref Range: 3.5 - 5.1 mmol/L 3.7   Chloride Latest Ref Range: 95 - 110 mmol/L 106   CO2 Latest Ref Range: 23 - 29 mmol/L 26   Anion Gap Latest Ref Range: 8 - 16 mmol/L 9   BUN, Bld Latest Ref Range: 6 - 20 mg/dL 22 (H)   Creatinine Latest Ref Range: 0.5 - 1.4 mg/dL 0.8   eGFR if non African American Latest Ref Range: >60 mL/min/1.73 m^2 >60.0   eGFR if African American Latest Ref Range: >60 mL/min/1.73 m^2 >60.0   Glucose Latest Ref Range: 70 - 110 mg/dL 92   Calcium Latest Ref Range: 8.7 - 10.5 mg/dL 9.8   Alkaline Phosphatase Latest Ref Range: 55 - 135 U/L 86   PROTEIN TOTAL Latest Ref Range: 6.0 - 8.4 g/dL 7.4   Albumin Latest Ref Range: 3.5 - 5.2 g/dL 4.2   BILIRUBIN TOTAL Latest Ref Range: 0.1 - 1.0 mg/dL 0.6   AST Latest Ref Range: 10 - 40 U/L 19   ALT Latest Ref Range: 10 - 44 U/L 16   Ammonia Latest Ref " Range: 10 - 50 umol/L 26   Arsenic Latest Ref Range: 0 - 12 ng/mL <1   Thiamine Latest Ref Range: 38 - 122 ug/L 63   Vitamin E Latest Ref Range: 500 - 1800 ug/dL 1302   Glutamic Acid Decarb Ab Latest Ref Range: <=0.02 nmol/L 0.00   TSH Latest Ref Range: 0.400 - 4.000 uIU/mL 0.649   PTH Latest Ref Range: 9.0 - 77.0 pg/mL 103.0 (H)   Race Unknown Test Not Performed   AChR Binding Ab, Serum Latest Ref Range: <=0.02 nmol/L 0.00   AChR Ganglionic Neuronal Ab Latest Ref Range: <=0.02 nmol/L 0.00   CRMP-5 IgG Latest Ref Range: <1:240 titer Negative   Neuronal (V-G) K+ Channel Ab, Serum Latest Ref Range: <=0.02 nmol/L 0.00   NMO Interpretive Comments Unknown SEE BELOW   N-Type Calcium Channel Ab Latest Ref Range: <=0.03 nmol/L 0.00   P/Q Type Calcium Channel Ab Latest Ref Range: <=0.02 nmol/L 0.00   PAVAL AGNA-1, Serum Latest Ref Range: <1:240 titer Negative   PAVAL TOBY-1, Serum Latest Ref Range: <1:240 titer Negative   PAVAL TOBY-2, Serum Latest Ref Range: <1:240 titer Negative   PAVAL TOBY-3, Serum Latest Ref Range: <1:240 titer Negative   PAVAL reflex test added Unknown None.   PAVAL,  Amphiphysin Ab, Serum Latest Ref Range: <1:240 titer Negative   PAVAL, PCA-1, Serum Latest Ref Range: <1:240 titer Negative   PAVAL, PCA-2, Serum Latest Ref Range: <1:240 titer Negative   PAVAL, PCA-Tr, Serum Latest Ref Range: <1:240 titer Negative   STRIATED MUSCLE AB Latest Ref Range: <1:120 titer Negative   Lead Latest Ref Range: 0.0 - 4.9 mcg/dL <1.0   Cadmium Latest Ref Range: 0.0 - 4.9 ng/mL 0.3   City Unknown Test Not Performed   County Unknown Test Not Performed   Guardian First Name Unknown Test Not Performed   Guardian Last Name Unknown Test Not Performed   Home Phone Unknown Test Not Performed   [place] Latest Ref Range: 0 - 9 ng/mL 2   State Unknown Test Not Performed   Street Address Unknown Test Not Performed   Venous/Capillary Unknown Test Not Performed   Zip Unknown Test Not Performed      Results for RADHA HAHN  (MRN [id number]) as of 8/23/2019 10:10   Ref. Range 6/3/2019 10:27   Sed Rate Latest Ref Range: 0 - 36 mm/Hr 13   CRP Latest Ref Range: 0.0 - 8.2 mg/L 0.5   CPK Latest Ref Range: 20 - 180 U/L 55   CERULOPLASMIN Latest Ref Range: 15.0 - 45.0 mg/dL 33.0   Acetylchol Modul Ab Latest Units: % 0   AChR Binding Ab, Serum Latest Ref Range: <=0.02 nmol/L 0.00   MG Interpretive Comments Unknown SEE BELOW   MuSK Antibody Test Latest Ref Range: 0.00 - 0.02 nmol/L 0.00   STRIATED MUSCLE AB Latest Ref Range: <1:120 titer Negative   Aldolase Latest Ref Range: 1.2 - 7.6 U/L 3.5       Imaging:  Brain MRI w/o cerebellar degeneration or lesion - however poor quality MRI  Repeat brain MRI  FINDINGS:  MRI of the brain demonstrates no infarction, mass, hemorrhage, extra-axial collection, or other acute finding.  A skull base lesions are seen.  Following contrast administration, there is no abnormal enhancing lesion identified.  A small incidental developmental venous anomaly is seen in the right medial posterior thalamus.  Cspine w/o without central impingement    EMG  Summary   Nerve conduction study of bilateral lower extremities revealed normal antidromic sensory peak latencies, action potentials, and conduction velocities.   Motor peak latencies, amplitudes, and conduction velocities were normal. F-waves were normal.   Concentric needle examination of selected muscles in bilateral lower extremities revealed no evidence of acute or chronic denervation.   Impression   This is a normal study.     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  Dysautonomia which also fits.    Suggested continue exercising for  balance.  Suggetsed start hobbies for manual dexterity        Suggested for better sleep try melatonin 5-6mg QHS    In need of a hospital bed for transfer struggles and life threatening issues with breathing at night.   Nirali requires the head of the bed to be elevated more than 30 degrees most of the time due to CHF, Chromic pulmonary disease, or problems with aspiration.The positioning of the body cannot be sufficiently resolved by the use of pillows and wedges.    Also suggsted to psychiatry to  Change abilify to a non-dopamine blocker to avpid worsening PDism            Pulmonary    Shortness of breath    Current Assessment & Plan   We discussed Bipap is necessary at night to avoid life threatening apnea spells which come with MSA  She chooses to to continue Bipap            Cardiac/Vascular    Orthostasis    Current Assessment & Plan   Stopped midodrine and fludrocirtisone  May restart if syncope or dizzy on transfers            Other    Insomnia    Current Assessment & Plan   For sleep, failed melatonin  Suggested  Remeron 7.5 QHS            This visit covered ongoing complex medical needs extending over multiple office visits covering multiple chronic issues.      Ondina Polo MD, MS  [facility]  Department of Neurology  Movement Disorders

## 2025-07-21 NOTE — ASSESSMENT & PLAN NOTE
We discussed Bipap is necessary at night to avoid life threatening apnea spells which come with MSA  She chooses to to continue Bipap

## 2025-07-21 NOTE — ASSESSMENT & PLAN NOTE
Moderate progressive ataxia, more stable after staring an investigational trial bit now mobility decline after trial ended  Given her MRI brain and advancing REM sleep dz I explained that this could be consistent with MSA-C.  Dysautonomia which also fits.    Suggested continue exercising for balance.  Suggetsed start hobbies for manual dexterity        Suggested for better sleep try melatonin 5-6mg QHS    In need of a hospital bed for transfer struggles and life threatening issues with breathing at night.   Nirali requires the head of the bed to be elevated more than 30 degrees most of the time due to CHF, Chromic pulmonary disease, or problems with aspiration.The positioning of the body cannot be sufficiently resolved by the use of pillows and wedges.    Also suggsted to psychiatry to  Change abilify to a non-dopamine blocker to avpid worsening PDism

## 2025-07-21 NOTE — TELEPHONE ENCOUNTER
Called patient to schedule virtual visit follow up with MD Melani. Left a voice message with my name, reason for calling, and clinic call back number. (3 months vv 40 min)

## 2025-08-20 ENCOUNTER — PATIENT MESSAGE (OUTPATIENT)
Facility: CLINIC | Age: 60
End: 2025-08-20
Payer: MEDICARE

## 2025-08-20 DIAGNOSIS — J98.4 RESTRICTIVE LUNG MECHANICS DUE TO NEUROMUSCULAR DISEASE: ICD-10-CM

## 2025-08-20 DIAGNOSIS — G90.3 MULTIPLE SYSTEM ATROPHY: Primary | ICD-10-CM

## 2025-08-20 DIAGNOSIS — R06.02 SHORTNESS OF BREATH: ICD-10-CM

## 2025-08-20 DIAGNOSIS — G70.9 RESTRICTIVE LUNG MECHANICS DUE TO NEUROMUSCULAR DISEASE: ICD-10-CM

## 2025-08-20 DIAGNOSIS — G23.8 MULTIPLE SYSTEM ATROPHY: Primary | ICD-10-CM

## 2025-08-21 ENCOUNTER — TELEPHONE (OUTPATIENT)
Dept: UROLOGY | Facility: CLINIC | Age: 60
End: 2025-08-21
Payer: MEDICARE

## 2025-08-26 ENCOUNTER — TELEPHONE (OUTPATIENT)
Facility: CLINIC | Age: 60
End: 2025-08-26
Payer: MEDICARE

## 2025-09-02 ENCOUNTER — OFFICE VISIT (OUTPATIENT)
Dept: UROLOGY | Facility: CLINIC | Age: 60
End: 2025-09-02
Payer: MEDICARE

## 2025-09-02 VITALS — SYSTOLIC BLOOD PRESSURE: 150 MMHG | DIASTOLIC BLOOD PRESSURE: 104 MMHG | HEART RATE: 75 BPM

## 2025-09-02 DIAGNOSIS — N31.9 NEUROGENIC BLADDER: ICD-10-CM

## 2025-09-02 DIAGNOSIS — L92.9 HYPERGRANULATION: Primary | ICD-10-CM

## 2025-09-02 PROCEDURE — 99213 OFFICE O/P EST LOW 20 MIN: CPT | Mod: PBBFAC

## 2025-09-02 PROCEDURE — 99213 OFFICE O/P EST LOW 20 MIN: CPT | Mod: S$PBB,,,

## 2025-09-02 PROCEDURE — 99999 PR PBB SHADOW E&M-EST. PATIENT-LVL III: CPT | Mod: PBBFAC,,,

## 2025-09-02 RX ORDER — PAROXETINE 40 MG/1
40 TABLET, FILM COATED ORAL
COMMUNITY
Start: 2025-08-04

## 2025-09-02 RX ORDER — SILVER NITRATE 38.21; 12.74 MG/1; MG/1
1 STICK TOPICAL
Status: COMPLETED | OUTPATIENT
Start: 2025-09-02 | End: 2025-09-02

## 2025-09-02 RX ADMIN — SILVER NITRATE 1 APPLICATOR: 38.21; 12.74 STICK TOPICAL at 03:09

## (undated) DEVICE — SEE MEDLINE ITEM 154981

## (undated) DEVICE — SYR 10CC LUER LOCK

## (undated) DEVICE — TRAY FOLEY 16FR INFECTION CONT

## (undated) DEVICE — SUT 3-0 VICRYL / RB-1

## (undated) DEVICE — ADHESIVE DERMABOND ADVANCED

## (undated) DEVICE — LUBRICANT SURGILUBE 2 OZ

## (undated) DEVICE — TRAY MINOR GEN SURG

## (undated) DEVICE — HOOK STAY ELAS 5MM 8EA/PK

## (undated) DEVICE — SUT 2-0 VICRYL / SH (J417)

## (undated) DEVICE — SOL NS 1000CC

## (undated) DEVICE — CLIPPER BLADE MOD 4406 (CAREF)

## (undated) DEVICE — PACK LAPSCP/PELVSCPY III TIBRN

## (undated) DEVICE — NDL HYPO REG 25G X 1 1/2

## (undated) DEVICE — ELECTRODE REM PLYHSV RETURN 9

## (undated) DEVICE — BANDAGE ADHESIVE

## (undated) DEVICE — SEE MEDLINE ITEM 146322

## (undated) DEVICE — SEE MEDLINE ITEM 157181

## (undated) DEVICE — GOWN SURGICAL X-LARGE

## (undated) DEVICE — PACK PERI/GYN OPTIMA

## (undated) DEVICE — SPONGE DERMACEA GAUZE 4X4

## (undated) DEVICE — SOL IRR WATER STRL 3000 ML

## (undated) DEVICE — SET IRR URLGY 2LINE UNIV SPIKE

## (undated) DEVICE — SEE MEDLINE ITEM 152622

## (undated) DEVICE — DRAPE STERI INSTRUMENT 1018

## (undated) DEVICE — SYR ONLY LUER LOCK 20CC

## (undated) DEVICE — SEE MEDLINE ITEM 146313

## (undated) DEVICE — SUT 3-0 VICRYL SH CR/8 18

## (undated) DEVICE — RETRACTOR STERILE PLASTIC G2